# Patient Record
Sex: MALE | Race: WHITE | NOT HISPANIC OR LATINO | Employment: OTHER | URBAN - METROPOLITAN AREA
[De-identification: names, ages, dates, MRNs, and addresses within clinical notes are randomized per-mention and may not be internally consistent; named-entity substitution may affect disease eponyms.]

---

## 2017-01-09 ENCOUNTER — TELEPHONE (OUTPATIENT)
Dept: FAMILY MEDICINE CLINIC | Facility: CLINIC | Age: 62
End: 2017-01-09

## 2017-01-09 RX ORDER — PREDNISONE 10 MG/1
TABLET ORAL
Qty: 20 TABLET | Refills: 0 | Status: SHIPPED | OUTPATIENT
Start: 2017-01-09 | End: 2017-10-30

## 2017-01-09 NOTE — TELEPHONE ENCOUNTER
----- Message from Ludmila Brady sent at 1/9/2017  9:06 AM EST -----  Contact: [facility];PT CALLED  PT WAS SEEN FOR SINUS INFECTION;HE FEELS HE HAS NEVER  GOTTEN COMPLETELY OVER IT BUT NOW IT WORSE-HE HAS   TAKEN MUSENIX  BUT IT GIVES HE DIARRHEA AND HE IS A  [employer] AND CAN NOT TAKE IT AND WORK ALSO TRIED  NYQUIL AND TAKE WITH HIS BLOOD PRESSURE ISSUES    IS THERE SOMETHING TO DRY UP HIS SINUS?  [phone]      PHARMACY-[facility] [place]

## 2017-01-09 NOTE — TELEPHONE ENCOUNTER
SPOKE WITH PT AND INFORMED HIM OF MESSAGE AND PT STATES NO PROBLEMS IN PAST AND VERBALIZED UNDERSTANDING. AND WILL PU IN MORNING. PT STATES THAT HE IS HAVING TOOTH PULLED IN MORNING NO PROBLEMS WITH RX

## 2017-01-09 NOTE — TELEPHONE ENCOUNTER
Please call.  Could try a short course of prednisone or steroids if he has not had any previous problem with these.  If agreeable, start prednisone 10 mg 4 daily for 2 days, then 3 daily for 2 days, then 2 daily for 2 days, then 1 daily for 2 days then stop.  #20.  Remind him to take this with food, and to take in the morning.  It may keep him awake if takes too late in the day.  If does not clear up with this, we will need follow-up office visit.

## 2017-01-16 ENCOUNTER — CLINICAL SUPPORT (OUTPATIENT)
Dept: FAMILY MEDICINE CLINIC | Facility: CLINIC | Age: 62
End: 2017-01-16

## 2017-01-16 VITALS — OXYGEN SATURATION: 98 %

## 2017-01-16 DIAGNOSIS — E53.8 VITAMIN B12 DEFICIENCY: Primary | ICD-10-CM

## 2017-01-16 PROCEDURE — 96372 THER/PROPH/DIAG INJ SC/IM: CPT | Performed by: FAMILY MEDICINE

## 2017-01-16 RX ORDER — CEFDINIR 300 MG/1
300 CAPSULE ORAL 2 TIMES DAILY
Qty: 20 CAPSULE | Refills: 0 | Status: SHIPPED | OUTPATIENT
Start: 2017-01-16 | End: 2017-10-30

## 2017-01-16 RX ORDER — CYANOCOBALAMIN 1000 UG/ML
1000 INJECTION, SOLUTION INTRAMUSCULAR; SUBCUTANEOUS
Status: DISCONTINUED | OUTPATIENT
Start: 2017-01-16 | End: 2020-09-10

## 2017-01-16 RX ADMIN — CYANOCOBALAMIN 1000 MCG: 1000 INJECTION, SOLUTION INTRAMUSCULAR; SUBCUTANEOUS at 13:18

## 2017-01-16 NOTE — PROGRESS NOTES
Patient in for B12 shot today. He said that tomorrow was the last day of the steroid that Dr. Botello had given him. He has been taking Mucinex but still has a bad cough, congestion, and coughing up phlegm.  Has improved some but has not gone completely away. Would like to see if Dr. Botello can recommend something else. He is not allergic to any medications and uses [facility] pharmacy.

## 2017-01-16 NOTE — MR AVS SNAPSHOT
Andrae Garza JrTatianna   1/16/2017 12:30 PM   Clinical Support    Dept Phone:  [phone]   Encounter #:  [id number]    Provider:  NURSE/MARLI LERNER   Department:  [facility] FAMILY MEDICINE                Your Full Care Plan              Today's Medication Changes          These changes are accurate as of: 1/16/17  2:14 PM.  If you have any questions, ask your nurse or doctor.               New Medication(s)Ordered:     cefdinir 300 MG capsule   Commonly known as:  OMNICEF   Take 1 capsule by mouth 2 (Two) Times a Day.   Started by:  Brent Botello MD            Where to Get Your Medications      These medications were sent to [facility]/pharmacy #[id number] [place], [place] - [address] [place] [address] AT [address] [phone] [facility] [phone] [place] [address]    Hours:  24-hours Phone:  [phone]     cefdinir 300 MG capsule                  Your Updated Medication List          This list is accurate as of: 1/16/17  2:14 PM.  Always use your most recent med list.                azithromycin 250 MG tablet   Commonly known as:  ZITHROMAX   Take 2 tablets the first day, then 1 tablet daily for 4 days.       cefdinir 300 MG capsule   Commonly known as:  OMNICEF   Take 1 capsule by mouth 2 (Two) Times a Day.       gabapentin 100 MG capsule   Commonly known as:  NEURONTIN   One po daily for one dose then one po BID for one po TID.       hydrochlorothiazide 25 MG tablet   Commonly known as:  HYDRODIURIL   Take 0.5 tablets by mouth Daily.       nadolol 40 MG tablet   Commonly known as:  CORGARD   Take 1 tablet by mouth Daily.       predniSONE 10 MG tablet   Commonly known as:  DELTASONE   prednisone 10 mg 4 daily for 2 days, then 3 daily for 2 days, then 2 daily for 2 days, then 1 daily for 2 days then stop.               You Were Diagnosed With        Codes Comments    Vitamin B12 deficiency    -  Primary ICD-10-CM: E53.8  ICD-9-CM: 266.2          Medications to be Given to You by a Medical Professional     Due       Frequency    (none) cyanocobalamin injection 1,000 mcg  Every 28 Days      Instructions     None    Patient Instructions History      Upcoming Appointments     Visit Type Date Time Department    INJECTION 1/16/2017 12:30 PM [facility]     Our records indicate that you have [facility]. If you would like to sign up for Social IQ (Social Influence Quotient), please email [email or web address] or call [phone] to obtain an activation code.             Other Info from Your Visit           Allergies     No Known Allergies      Vital Signs     Oxygen Saturation Smoking Status                98% Current Every Day Smoker          Problems and Diagnoses Noted     Vitamin B12 deficiency    -  Primary      Medications Administered     cyanocobalamin injection 1,000 mcg

## 2017-01-16 NOTE — PROGRESS NOTES
Patient was here getting B12 shot.  Reported that he was not getting better with the cough and congestion.  We will try Omnicef 300 mg 2 daily.  Prescription sent.

## 2017-03-13 ENCOUNTER — CLINICAL SUPPORT (OUTPATIENT)
Dept: FAMILY MEDICINE CLINIC | Facility: CLINIC | Age: 62
End: 2017-03-13

## 2017-03-13 ENCOUNTER — TELEPHONE (OUTPATIENT)
Dept: FAMILY MEDICINE CLINIC | Facility: CLINIC | Age: 62
End: 2017-03-13

## 2017-03-13 DIAGNOSIS — E53.8 COBALAMIN DEFICIENCY: ICD-10-CM

## 2017-03-13 DIAGNOSIS — I10 ESSENTIAL HYPERTENSION: ICD-10-CM

## 2017-03-13 DIAGNOSIS — E53.8 B12 DEFICIENCY: Primary | ICD-10-CM

## 2017-03-13 DIAGNOSIS — E78.5 HYPERLIPIDEMIA, UNSPECIFIED: ICD-10-CM

## 2017-03-13 DIAGNOSIS — Z12.5 PROSTATE CANCER SCREENING: Primary | ICD-10-CM

## 2017-03-13 DIAGNOSIS — E53.8 VITAMIN B12 DEFICIENCY: ICD-10-CM

## 2017-03-13 PROCEDURE — 96372 THER/PROPH/DIAG INJ SC/IM: CPT | Performed by: FAMILY MEDICINE

## 2017-03-13 RX ORDER — CYANOCOBALAMIN 1000 UG/ML
1000 INJECTION, SOLUTION INTRAMUSCULAR; SUBCUTANEOUS ONCE
Status: COMPLETED | OUTPATIENT
Start: 2017-03-13 | End: 2017-03-13

## 2017-03-13 RX ORDER — NADOLOL 40 MG/1
40 TABLET ORAL DAILY
Qty: 90 TABLET | Refills: 1 | Status: SHIPPED | OUTPATIENT
Start: 2017-03-13 | End: 2017-11-06 | Stop reason: SDUPTHER

## 2017-03-13 RX ADMIN — CYANOCOBALAMIN 1000 MCG: 1000 INJECTION, SOLUTION INTRAMUSCULAR; SUBCUTANEOUS at 12:14

## 2017-03-13 NOTE — TELEPHONE ENCOUNTER
When pt was in today for his B12 injection, he requested a refill on his Nadolol 40mg and wants to restart his Atorvastatin again.  [facility]

## 2017-03-13 NOTE — TELEPHONE ENCOUNTER
Please call.  I am the nadolol prescription.  Please confirm if he is currently taking any cholesterol medication?  If so, which one and what is the dose?  If not, we need to determine what his chol level is on the blood work that he had done today to see what dose we need to restart the atorvastatin.

## 2017-03-14 LAB
ALBUMIN SERPL-MCNC: 3.9 G/DL (ref 3.2–4.8)
ALBUMIN/GLOB SERPL: 1.9 G/DL (ref 1.5–2.5)
ALP SERPL-CCNC: 86 U/L (ref 25–100)
ALT SERPL-CCNC: 27 U/L (ref 7–40)
AST SERPL-CCNC: 28 U/L (ref 0–33)
BILIRUB SERPL-MCNC: 0.7 MG/DL (ref 0.3–1.2)
BUN SERPL-MCNC: 13 MG/DL (ref 9–23)
BUN/CREAT SERPL: 11.8 (ref 7–25)
CALCIUM SERPL-MCNC: 9.7 MG/DL (ref 8.7–10.4)
CHLORIDE SERPL-SCNC: 103 MMOL/L (ref 99–109)
CHOLEST SERPL-MCNC: 121 MG/DL (ref 0–200)
CHOLEST/HDLC SERPL: 3.67 {RATIO}
CO2 SERPL-SCNC: 29 MMOL/L (ref 20–31)
CREAT SERPL-MCNC: 1.1 MG/DL (ref 0.6–1.3)
GLOBULIN SER CALC-MCNC: 2.1 GM/DL
GLUCOSE SERPL-MCNC: 92 MG/DL (ref 70–100)
HDLC SERPL-MCNC: 33 MG/DL (ref 40–60)
LDLC SERPL CALC-MCNC: 61 MG/DL (ref 0–100)
POTASSIUM SERPL-SCNC: 4.2 MMOL/L (ref 3.5–5.5)
PROT SERPL-MCNC: 6 G/DL (ref 5.7–8.2)
PSA SERPL-MCNC: 2 NG/ML (ref 0–4)
SODIUM SERPL-SCNC: 139 MMOL/L (ref 132–146)
TRIGL SERPL-MCNC: 137 MG/DL (ref 0–150)
VIT B12 SERPL-MCNC: 541 PG/ML (ref 211–911)
VLDLC SERPL CALC-MCNC: 27.4 MG/DL

## 2017-03-15 NOTE — TELEPHONE ENCOUNTER
No, pt is not currently on the Atorvastatin, he wants to go back on what he was on in the past. He does not have his past bottle for the dose.

## 2017-07-10 ENCOUNTER — TELEPHONE (OUTPATIENT)
Dept: FAMILY MEDICINE CLINIC | Facility: CLINIC | Age: 62
End: 2017-07-10

## 2017-07-10 DIAGNOSIS — I10 ESSENTIAL HYPERTENSION: ICD-10-CM

## 2017-07-10 RX ORDER — HYDROCHLOROTHIAZIDE 25 MG/1
12.5 TABLET ORAL DAILY
Qty: 45 TABLET | Refills: 1 | Status: SHIPPED | OUTPATIENT
Start: 2017-07-10 | End: 2017-10-16 | Stop reason: SDUPTHER

## 2017-07-10 NOTE — TELEPHONE ENCOUNTER
----- Message from Ludmila Fields sent at 7/10/2017  9:00 AM EDT -----  Contact: BENTLEY  PATIENT REQUESTING A REFILL:    HYDROCHLOROTHIAZIDE 25 MG  A 90 DAY SUPPLY WITH 1 REFILL SHOULD HOLD HIM TILL HIS NEXT APPT.     [facility] [place]

## 2017-10-16 DIAGNOSIS — I10 ESSENTIAL HYPERTENSION: ICD-10-CM

## 2017-10-16 RX ORDER — HYDROCHLOROTHIAZIDE 25 MG/1
TABLET ORAL
Qty: 45 TABLET | Refills: 1 | Status: SHIPPED | OUTPATIENT
Start: 2017-10-16 | End: 2018-06-25 | Stop reason: SDUPTHER

## 2017-10-30 ENCOUNTER — OFFICE VISIT (OUTPATIENT)
Dept: FAMILY MEDICINE CLINIC | Facility: CLINIC | Age: 62
End: 2017-10-30

## 2017-10-30 VITALS
TEMPERATURE: 97.6 F | HEART RATE: 72 BPM | HEIGHT: 74 IN | SYSTOLIC BLOOD PRESSURE: 126 MMHG | DIASTOLIC BLOOD PRESSURE: 94 MMHG | WEIGHT: 235.4 LBS | RESPIRATION RATE: 20 BRPM | BODY MASS INDEX: 30.21 KG/M2

## 2017-10-30 DIAGNOSIS — I10 ESSENTIAL HYPERTENSION: Primary | ICD-10-CM

## 2017-10-30 PROCEDURE — 99213 OFFICE O/P EST LOW 20 MIN: CPT | Performed by: FAMILY MEDICINE

## 2017-10-30 PROCEDURE — 3008F BODY MASS INDEX DOCD: CPT | Performed by: FAMILY MEDICINE

## 2017-10-30 RX ORDER — CETIRIZINE HYDROCHLORIDE 10 MG/1
10 TABLET ORAL DAILY
COMMUNITY
End: 2019-12-09

## 2017-10-30 RX ORDER — ASPIRIN 81 MG/1
81 TABLET ORAL DAILY
COMMUNITY
End: 2019-12-09 | Stop reason: ALTCHOICE

## 2017-10-30 RX ORDER — LISINOPRIL 10 MG/1
10 TABLET ORAL DAILY
Qty: 30 TABLET | Refills: 0 | Status: SHIPPED | OUTPATIENT
Start: 2017-10-30 | End: 2017-11-26 | Stop reason: SDUPTHER

## 2017-10-30 NOTE — PATIENT INSTRUCTIONS
Go to the nearest ER or retu  Hypertension  Hypertension is another name for high blood pressure. High blood pressure forces your heart to work harder to pump blood. A blood pressure reading has two numbers, which includes a higher number over a lower number (example: 110/72).  HOME CARE   · Have your blood pressure rechecked by your doctor.  · Only take medicine as told by your doctor. Follow the directions carefully. The medicine does not work as well if you skip doses. Skipping doses also puts you at risk for problems.  · Do not smoke.  · Monitor your blood pressure at home as told by your doctor.  GET HELP IF:  · You think you are having a reaction to the medicine you are taking.  · You have repeat headaches or feel dizzy.  · You have puffiness (swelling) in your ankles.  · You have trouble with your vision.  GET HELP RIGHT AWAY IF:   · You get a very bad headache and are confused.  · You feel weak, numb, or faint.  · You get chest or belly (abdominal) pain.  · You throw up (vomit).  · You cannot breathe very well.  MAKE SURE YOU:   · Understand these instructions.  · Will watch your condition.  · Will get help right away if you are not doing well or get worse.     This information is not intended to replace advice given to you by your health care provider. Make sure you discuss any questions you have with your health care provider.     Document Released: 06/05/2009 Document Revised: 12/23/2014 Document Reviewed: 10/10/2014  [employer] Interactive Patient Education ©2017 [employer]  rn to clinic if symptoms worsen, fever/chill develop

## 2017-10-30 NOTE — PROGRESS NOTES
Subjective   Andrae Garza Jr. is a 62 y.o. male.     Hypertension   This is a chronic problem. The current episode started more than 1 year ago. The problem is uncontrolled. Associated symptoms include headaches. Pertinent negatives include no anxiety, blurred vision, chest pain, neck pain, palpitations, peripheral edema, shortness of breath or sweats. Risk factors for coronary artery disease include male gender, obesity and sedentary lifestyle. Past treatments include diuretics and beta blockers. The current treatment provides moderate improvement. There is no history of CVA.   He has a headache in the right temporal region. The same type of headache he had before being treated for HTN, due to elevated blood pressure. Blood pressure tends to be higher in the mornings. He has taken his medication this morning. Has started taking a full HCTZ 25mg tab, instead of just 1/2 dose.   He brought in a BP log, majority readings are >140/90    Currently dealing with a tooth ache and back/shoulder pain. Thinks that increased pain could be causing increased blood pressure.     The following portions of the patient's history were reviewed and updated as appropriate: allergies, current medications, past family history, past medical history, past social history, past surgical history and problem list.    Review of Systems   Constitutional: Negative for chills and fever.   Eyes: Negative for blurred vision.   Respiratory: Negative for chest tightness and shortness of breath.    Cardiovascular: Negative for chest pain and palpitations.   Gastrointestinal: Negative for nausea and vomiting.   Musculoskeletal: Negative for neck pain.   Neurological: Positive for headaches. Negative for dizziness and light-headedness.   Hematological: Negative for adenopathy. Does not bruise/bleed easily.   Psychiatric/Behavioral: Negative for confusion.       Objective   Physical Exam   Constitutional: He is oriented to person, place, and time. He  appears well-developed and well-nourished.   HENT:   Head: Normocephalic and atraumatic.   Right Ear: External ear normal.   Left Ear: External ear normal.   Nose: Nose normal.   Eyes: Conjunctivae are normal.   Neck: Normal range of motion.   Cardiovascular: Normal rate, regular rhythm and normal heart sounds.    Pulmonary/Chest: Effort normal and breath sounds normal.   Musculoskeletal: He exhibits no deformity.   Neurological: He is alert and oriented to person, place, and time. No cranial nerve deficit.   Skin: Skin is warm and dry.   Psychiatric: He has a normal mood and affect. His behavior is normal.   Nursing note and vitals reviewed.      Assessment/Plan   Andrae was seen today for hypertension and headache.    Diagnoses and all orders for this visit:    Essential hypertension  -     lisinopril (PRINIVIL,ZESTRIL) 10 MG tablet; Take 1 tablet by mouth Daily.    BMI 30.0-30.9,adult      He doesn't want to increase Nadolol, states heart rate is typically low in the 60s and 50s. Prefers not to increase HCTZ dosage because of increased diuretic effect. Previously treated with Norvasc, caused LE edema. Will add Lisinopril 10mg to therapy. Most recent BUN/Cr normal, March 2017. He has a routine follow up next week, reevaluate HTN then. Will need to ensure renal function is stable, only one kidney remains after having renal cell carcinoma.   He does have a dentist appointment to address the tooth ache.

## 2017-11-06 ENCOUNTER — OFFICE VISIT (OUTPATIENT)
Dept: FAMILY MEDICINE CLINIC | Facility: CLINIC | Age: 62
End: 2017-11-06

## 2017-11-06 VITALS
BODY MASS INDEX: 29.71 KG/M2 | WEIGHT: 231.5 LBS | SYSTOLIC BLOOD PRESSURE: 108 MMHG | HEIGHT: 74 IN | TEMPERATURE: 97.1 F | HEART RATE: 62 BPM | OXYGEN SATURATION: 97 % | DIASTOLIC BLOOD PRESSURE: 80 MMHG | RESPIRATION RATE: 16 BRPM

## 2017-11-06 DIAGNOSIS — Z95.3 H/O AORTIC VALVE REPLACEMENT WITH PORCINE VALVE: ICD-10-CM

## 2017-11-06 DIAGNOSIS — F17.200 TOBACCO USE DISORDER: ICD-10-CM

## 2017-11-06 DIAGNOSIS — E53.8 COBALAMIN DEFICIENCY: Chronic | ICD-10-CM

## 2017-11-06 DIAGNOSIS — M79.2 THORACIC NEURALGIA: ICD-10-CM

## 2017-11-06 DIAGNOSIS — K58.0 IRRITABLE BOWEL SYNDROME WITH DIARRHEA: ICD-10-CM

## 2017-11-06 DIAGNOSIS — E78.2 MIXED HYPERLIPIDEMIA: ICD-10-CM

## 2017-11-06 DIAGNOSIS — Z12.2 ENCOUNTER FOR SCREENING FOR LUNG CANCER: ICD-10-CM

## 2017-11-06 DIAGNOSIS — I10 ESSENTIAL HYPERTENSION: Primary | ICD-10-CM

## 2017-11-06 PROCEDURE — 99214 OFFICE O/P EST MOD 30 MIN: CPT | Performed by: FAMILY MEDICINE

## 2017-11-06 RX ORDER — NADOLOL 40 MG/1
40 TABLET ORAL DAILY
Qty: 90 TABLET | Refills: 1 | Status: SHIPPED | OUTPATIENT
Start: 2017-11-06 | End: 2018-06-25 | Stop reason: SDUPTHER

## 2017-11-06 RX ORDER — DICYCLOMINE HYDROCHLORIDE 10 MG/1
CAPSULE ORAL
Qty: 30 CAPSULE | Refills: 2 | Status: SHIPPED | OUTPATIENT
Start: 2017-11-06 | End: 2019-04-22

## 2017-11-06 RX ORDER — LIDOCAINE 50 MG/G
1 PATCH TOPICAL EVERY 24 HOURS
Qty: 30 PATCH | Refills: 5 | Status: SHIPPED | OUTPATIENT
Start: 2017-11-06 | End: 2018-01-04

## 2017-11-06 NOTE — PROGRESS NOTES
Assessment/Plan     Problem List Items Addressed This Visit        Cardiovascular and Mediastinum    Hyperlipidemia (Chronic)    Relevant Orders    Comprehensive Metabolic Panel    Lipid Panel With / Chol / HDL Ratio    Hypertension - Primary (Chronic)    Relevant Medications    nadolol (CORGARD) 40 MG tablet    Other Relevant Orders    CBC & Differential    Comprehensive Metabolic Panel    Lipid Panel With / Chol / HDL Ratio       Digestive    Cobalamin deficiency (Chronic)    Relevant Orders    CBC & Differential    Vitamin B12      Other Visit Diagnoses     Thoracic neuralgia        Right mid thoracic area    Relevant Medications    lidocaine (LIDODERM) 5 %    H/O aortic valve replacement with porcine valve        Relevant Orders    Ambulatory Referral to Cardiology    Irritable bowel syndrome with diarrhea        Relevant Medications    dicyclomine (BENTYL) 10 MG capsule    Tobacco use disorder        Relevant Orders    CT chest low dose wo    Encounter for screening for lung cancer        Relevant Orders    CT chest low dose wo           Follow up: Return in about 6 months (around 5/6/2018), or if symptoms worsen or fail to improve, for follow up depends on review of labs and testing.     DISCUSSION  Hypertension.  Stable.  Continue medication.    Hyperlipidemia.  Check labs as noted.    History of tobacco use.  Current tobacco use.  Qualifies for CT screening for lung cancer.  Order placed.    Irritable bowel syndrome.  Diarrhea predominant.  Usually occurs after eating.  Try Bentyl prior to large meal.  Side effects explained.    Thoracic neuralgia.  Occurred after surgery for nephrectomy.  Try lidocaine patch.    History of aortic valve replacement.  Refer to cardiology for establish care.  He would like to see Dr. Thee Abebe in [place].    B12 deficiency.  Recheck level.  Has not been getting shots.    Further plan once we review blood work.      MEDICATIONS PRESCRIBED  Requested Prescriptions      Signed Prescriptions Disp Refills   • lidocaine (LIDODERM) 5 % 30 patch 5     Sig: Place 1 patch on the skin Daily. Remove & Discard patch within 12 hours or as directed by MD   • dicyclomine (BENTYL) 10 MG capsule 30 capsule 2     Sig: One po 1 hr before meals if needed up to 4 times per day   • nadolol (CORGARD) 40 MG tablet 90 tablet 1     Sig: Take 1 tablet by mouth Daily.          -------------------------------------------    Subjective     Chief Complaint   Patient presents with   • Hypertension     follow up    • Labs Only     fasting   • Med Refill       Hypertension   This is a chronic problem. The current episode started more than 1 year ago. The problem has been rapidly improving since onset. The problem is controlled (better with meds). Pertinent negatives include no chest pain, peripheral edema or shortness of breath. (Up every 2 hrs to urinate with the Lisinopril  ) Risk factors for coronary artery disease include dyslipidemia. Past treatments include beta blockers and diuretics (add Lisinopril 10 mg one po daily). The current treatment provides moderate improvement. There are no compliance problems.  There is no history of angina, kidney disease or CAD/MI. There is no history of chronic renal disease.   Hyperlipidemia   This is a chronic problem. The current episode started more than 1 year ago. The problem is controlled. Recent lipid tests were reviewed and are normal. He has no history of chronic renal disease. Pertinent negatives include no chest pain or shortness of breath. He is currently on no antihyperlipidemic treatment. The current treatment provides moderate (was good without meds since last check) improvement of lipids. Risk factors for coronary artery disease include dyslipidemia and hypertension.       B12 def  Has not had b12 shot done in the past      Thoracic back pain   Since epidural shot for surg (kidney surg ) 10 yrs ago. Pain in the middle back. Heat and cold helps. Uses gel  "packs and still hurts. No arm rests and pain without arm rest.   ? Lidocaine patch. ? Nerve pain    Tried acupuncture etc. And no help  ? 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   With in 30 min  Most every time if increased meal.       Past Medical History,Medications, Allergies, and social history was reviewed.    Review of Systems   Constitutional: Positive for fatigue.   HENT: Negative.    Respiratory: Negative.  Negative for shortness of breath.    Cardiovascular: Negative.  Negative for chest pain.   Gastrointestinal: Positive for diarrhea. Negative for blood in stool, nausea and vomiting.   Genitourinary: Negative.    Musculoskeletal: Positive for back pain.   Neurological: Negative.    Psychiatric/Behavioral: Negative.        Objective     Vitals:    11/06/17 0924   BP: 108/80   Pulse: 62   Resp: 16   Temp: 97.1 °F (36.2 °C)   TempSrc: Temporal Artery    SpO2: 97%   Weight: 231 lb 8 oz (105 kg)   Height: 74\" (188 cm)        Physical Exam   Constitutional: He is oriented to person, place, and time. Vital signs are normal. He appears well-developed and well-nourished.   HENT:   Head: Normocephalic and atraumatic.   Right Ear: Hearing, tympanic membrane, external ear and ear canal normal.   Left Ear: Hearing, tympanic membrane, external ear and ear canal normal.   Nose: Nose normal.   Mouth/Throat: Oropharynx is clear and moist.   Eyes: Conjunctivae, EOM and lids are normal. Pupils are equal, round, and reactive to light.   Neck: Normal range of motion. Neck supple. No thyromegaly present.   Cardiovascular: Normal rate, regular rhythm and normal heart sounds.  Exam reveals no friction rub.    No murmur heard.  Pulmonary/Chest: Effort normal and breath sounds normal. No respiratory distress. He has no wheezes. He has no rales.   Abdominal: Soft. Normal appearance and bowel sounds are normal. He exhibits no distension and no mass. There is no " tenderness. There is no rebound and no guarding.   Musculoskeletal: He exhibits no edema.   Neurological: He is alert and oriented to person, place, and time. He has normal strength.   Skin: Skin is warm and dry.   Psychiatric: He has a normal mood and affect. His speech is normal and behavior is normal. Cognition and memory are normal.   Nursing note and vitals reviewed.  Skin check him back in chest and abdomen revealed several areas of seborrheic keratosis nothing concerning appearing.            Brent Botello MD

## 2017-11-07 LAB
ALBUMIN SERPL-MCNC: 4 G/DL (ref 3.2–4.8)
ALBUMIN/GLOB SERPL: 2 G/DL (ref 1.5–2.5)
ALP SERPL-CCNC: 79 U/L (ref 25–100)
ALT SERPL-CCNC: 32 U/L (ref 7–40)
AST SERPL-CCNC: 30 U/L (ref 0–33)
BASOPHILS # BLD AUTO: 0.02 10*3/MM3 (ref 0–0.2)
BASOPHILS NFR BLD AUTO: 0.3 % (ref 0–1)
BILIRUB SERPL-MCNC: 1.2 MG/DL (ref 0.3–1.2)
BUN SERPL-MCNC: 18 MG/DL (ref 9–23)
BUN/CREAT SERPL: 16.4 (ref 7–25)
CALCIUM SERPL-MCNC: 9.4 MG/DL (ref 8.7–10.4)
CHLORIDE SERPL-SCNC: 100 MMOL/L (ref 99–109)
CHOLEST SERPL-MCNC: 157 MG/DL (ref 0–200)
CHOLEST/HDLC SERPL: 4.91 {RATIO}
CO2 SERPL-SCNC: 31 MMOL/L (ref 20–31)
CREAT SERPL-MCNC: 1.1 MG/DL (ref 0.6–1.3)
EOSINOPHIL # BLD AUTO: 0.22 10*3/MM3 (ref 0–0.3)
EOSINOPHIL NFR BLD AUTO: 3.6 % (ref 0–3)
ERYTHROCYTE [DISTWIDTH] IN BLOOD BY AUTOMATED COUNT: 12.4 % (ref 11.3–14.5)
GFR SERPLBLD CREATININE-BSD FMLA CKD-EPI: 68 ML/MIN/1.73
GFR SERPLBLD CREATININE-BSD FMLA CKD-EPI: 82 ML/MIN/1.73
GLOBULIN SER CALC-MCNC: 2 GM/DL
GLUCOSE SERPL-MCNC: 96 MG/DL (ref 70–100)
HCT VFR BLD AUTO: 46.1 % (ref 38.9–50.9)
HDLC SERPL-MCNC: 32 MG/DL (ref 40–60)
HGB BLD-MCNC: 15.5 G/DL (ref 13.1–17.5)
IMM GRANULOCYTES # BLD: 0.01 10*3/MM3 (ref 0–0.03)
IMM GRANULOCYTES NFR BLD: 0.2 % (ref 0–0.6)
LDLC SERPL CALC-MCNC: 97 MG/DL (ref 0–100)
LYMPHOCYTES # BLD AUTO: 1.37 10*3/MM3 (ref 0.6–4.8)
LYMPHOCYTES NFR BLD AUTO: 22.7 % (ref 24–44)
MCH RBC QN AUTO: 31.1 PG (ref 27–31)
MCHC RBC AUTO-ENTMCNC: 33.6 G/DL (ref 32–36)
MCV RBC AUTO: 92.4 FL (ref 80–99)
MONOCYTES # BLD AUTO: 0.5 10*3/MM3 (ref 0–1)
MONOCYTES NFR BLD AUTO: 8.3 % (ref 0–12)
NEUTROPHILS # BLD AUTO: 3.91 10*3/MM3 (ref 1.5–8.3)
NEUTROPHILS NFR BLD AUTO: 64.9 % (ref 41–71)
PLATELET # BLD AUTO: 203 10*3/MM3 (ref 150–450)
POTASSIUM SERPL-SCNC: 3.9 MMOL/L (ref 3.5–5.5)
PROT SERPL-MCNC: 6 G/DL (ref 5.7–8.2)
RBC # BLD AUTO: 4.99 10*6/MM3 (ref 4.2–5.76)
SODIUM SERPL-SCNC: 138 MMOL/L (ref 132–146)
TRIGL SERPL-MCNC: 141 MG/DL (ref 0–150)
VIT B12 SERPL-MCNC: 433 PG/ML (ref 211–911)
VLDLC SERPL CALC-MCNC: 28.2 MG/DL
WBC # BLD AUTO: 6.03 10*3/MM3 (ref 3.5–10.8)

## 2017-11-13 ENCOUNTER — APPOINTMENT (OUTPATIENT)
Dept: CT IMAGING | Facility: HOSPITAL | Age: 62
End: 2017-11-13

## 2017-11-13 ENCOUNTER — TELEPHONE (OUTPATIENT)
Dept: FAMILY MEDICINE CLINIC | Facility: CLINIC | Age: 62
End: 2017-11-13

## 2017-11-13 ENCOUNTER — HOSPITAL ENCOUNTER (OUTPATIENT)
Dept: CT IMAGING | Facility: HOSPITAL | Age: 62
Discharge: HOME | End: 2017-11-13
Admitting: FAMILY MEDICINE

## 2017-11-13 DIAGNOSIS — G89.29 CHRONIC BILATERAL THORACIC BACK PAIN: ICD-10-CM

## 2017-11-13 DIAGNOSIS — M79.2 THORACIC NEURALGIA: Primary | ICD-10-CM

## 2017-11-13 DIAGNOSIS — M54.6 CHRONIC BILATERAL THORACIC BACK PAIN: ICD-10-CM

## 2017-11-13 DIAGNOSIS — Z12.2 ENCOUNTER FOR SCREENING FOR LUNG CANCER: ICD-10-CM

## 2017-11-13 DIAGNOSIS — F17.200 TOBACCO USE DISORDER: ICD-10-CM

## 2017-11-13 PROCEDURE — G0297 LDCT FOR LUNG CA SCREEN: HCPCS

## 2017-11-13 NOTE — TELEPHONE ENCOUNTER
Please call, I do not know of any other type of medication that might help this except it would make him very sleepy and may be difficult to drive.      Did he ever have an MRI of his thoracic spine to see if there is a herniated disc?   If not, recommend setting up MRI of the thoracic spine.  Diagnosis thoracic back pain, chronic.

## 2017-11-13 NOTE — TELEPHONE ENCOUNTER
----- Message from Sangita Wayne sent at 11/13/2017 12:13 PM EST -----  Contact: DR. BENTLEY; PT UPDATE  PT STATED THAT LAST TIME HE WAS HERE HE WAS OFFERED THE LIDOCAINE PATCHES AND THEY HAVE NO EFFECT ON HIM. HE WANTED TO KNOW IF THERE WAS ANYTHING ELSE THAT HE CAN TRY?       CALL BACK   [phone]  OKAY TO LEAVE AIL

## 2017-11-16 NOTE — TELEPHONE ENCOUNTER
Please call.  MRI of the thoracic spine was ordered and also, see CT scan of the chest result note.

## 2017-11-22 ENCOUNTER — HOSPITAL ENCOUNTER (OUTPATIENT)
Dept: MRI IMAGING | Facility: HOSPITAL | Age: 62
Discharge: HOME | End: 2017-11-22
Admitting: FAMILY MEDICINE

## 2017-11-22 DIAGNOSIS — M54.6 CHRONIC BILATERAL THORACIC BACK PAIN: ICD-10-CM

## 2017-11-22 DIAGNOSIS — G89.29 CHRONIC BILATERAL THORACIC BACK PAIN: ICD-10-CM

## 2017-11-22 DIAGNOSIS — M79.2 THORACIC NEURALGIA: ICD-10-CM

## 2017-11-22 PROCEDURE — 72146 MRI CHEST SPINE W/O DYE: CPT

## 2017-11-26 DIAGNOSIS — I10 ESSENTIAL HYPERTENSION: ICD-10-CM

## 2017-11-26 RX ORDER — LISINOPRIL 10 MG/1
TABLET ORAL
Qty: 30 TABLET | Refills: 5 | Status: SHIPPED | OUTPATIENT
Start: 2017-11-26 | End: 2017-12-04 | Stop reason: SDUPTHER

## 2017-12-04 ENCOUNTER — TELEPHONE (OUTPATIENT)
Dept: FAMILY MEDICINE CLINIC | Facility: CLINIC | Age: 62
End: 2017-12-04

## 2017-12-04 DIAGNOSIS — I10 ESSENTIAL HYPERTENSION: ICD-10-CM

## 2017-12-04 RX ORDER — LISINOPRIL 20 MG/1
20 TABLET ORAL DAILY
Qty: 30 TABLET | Refills: 5 | Status: SHIPPED | OUTPATIENT
Start: 2017-12-04 | End: 2018-08-02 | Stop reason: SDUPTHER

## 2017-12-04 NOTE — TELEPHONE ENCOUNTER
Looks like Dr. Valencia had sent  in a refill of his blood pressure medicine on 11/26/2017 so I would like to change the.  I am going to send in a new prescription for lisinopril 20 mg one daily #30+5 refills given increased blood pressure.  Please let patient know and I will make a note on the prescription to cancel the 10 mg lisinopril at the pharmacy.  Continue to monitor blood pressure and call if not improving.

## 2017-12-04 NOTE — TELEPHONE ENCOUNTER
----- Message from Ludmila Brady sent at 12/4/2017  9:08 AM EST -----  Contact: [facility];PT CALLED  PT RETURNED CALL ABOUT MRI-UNABLE TO GET NURSE ON LINE-PT WAS  INFORMED OF RESULTS AND HE REQUEST THE LABS AND IMAGE RESULTS  TO BE SENT TO HIM AND HE WILL DECIDE FROM THERE WHICH TREATMENT  HE WANTS TO START.  PT IS A  AND UNABLE TO TAKE CALLS AT WORK DUE   TO THE BUSY SEASON.  PT STATES HIS BP /95 TODAY AND IT HAS BEEN RUNNING HIGH-HE  NEEDS A REFILL ON HIS LISINOPRIL 10MG AT [facility] UNLESS DR BENTLEY FEELS HE NEEDS TO INCREASE IT    [phone]

## 2017-12-11 ENCOUNTER — TELEPHONE (OUTPATIENT)
Dept: FAMILY MEDICINE CLINIC | Facility: CLINIC | Age: 62
End: 2017-12-11

## 2017-12-11 DIAGNOSIS — M51.9 THORACIC DISC DISEASE: Primary | ICD-10-CM

## 2017-12-11 NOTE — TELEPHONE ENCOUNTER
----- Message from Gabriel Ambriz sent at 12/11/2017  2:19 PM EST -----  Contact: [facility] / [facility] CALL  PATIENT CALLED AND WOULD LIKE A REFERRAL FOR PAIN MANAGEMENT.  HE WOULD LIKE TO SEE THE NEW DR THAT IS AT [facility].  [facility]

## 2018-01-04 ENCOUNTER — OFFICE VISIT (OUTPATIENT)
Dept: FAMILY MEDICINE CLINIC | Facility: CLINIC | Age: 63
End: 2018-01-04

## 2018-01-04 VITALS
RESPIRATION RATE: 16 BRPM | SYSTOLIC BLOOD PRESSURE: 112 MMHG | WEIGHT: 230 LBS | OXYGEN SATURATION: 98 % | TEMPERATURE: 98.5 F | HEIGHT: 74 IN | BODY MASS INDEX: 29.52 KG/M2 | HEART RATE: 60 BPM | DIASTOLIC BLOOD PRESSURE: 82 MMHG

## 2018-01-04 DIAGNOSIS — J06.9 PROTRACTED URI: Primary | ICD-10-CM

## 2018-01-04 DIAGNOSIS — R05.3 CHRONIC COUGH: ICD-10-CM

## 2018-01-04 PROCEDURE — 99213 OFFICE O/P EST LOW 20 MIN: CPT | Performed by: FAMILY MEDICINE

## 2018-01-04 RX ORDER — PREDNISONE 10 MG/1
TABLET ORAL
Qty: 20 TABLET | Refills: 0 | Status: SHIPPED | OUTPATIENT
Start: 2018-01-04 | End: 2018-06-25

## 2018-01-04 RX ORDER — AZITHROMYCIN 250 MG/1
TABLET, FILM COATED ORAL
Qty: 6 TABLET | Refills: 0 | Status: SHIPPED | OUTPATIENT
Start: 2018-01-04 | End: 2018-06-25

## 2018-01-04 NOTE — PROGRESS NOTES
Assessment/Plan     Problem List Items Addressed This Visit     None      Visit Diagnoses     Protracted URI    -  Primary    Relevant Medications    predniSONE (DELTASONE) 10 MG tablet    azithromycin (ZITHROMAX) 250 MG tablet    Chronic cough        Relevant Medications    azithromycin (ZITHROMAX) 250 MG tablet           Follow up: No Follow-up on file.     DISCUSSION  Due to persistent upper respiratory infection.  Start prednisone and Z-James.  Side effects explained.  Increase fluids.  Rest.  Off work 2018 through 2018.  Return to work next week but call if not improving.  Continue efforts to quit smoking.      MEDICATIONS PRESCRIBED  Requested Prescriptions     Signed Prescriptions Disp Refills   • predniSONE (DELTASONE) 10 MG tablet 20 tablet 0     Si po x 2 days then 3 po daily x 2 days then 2 po daily x 2 days then 1 po daily x 2 days then stop.   • azithromycin (ZITHROMAX) 250 MG tablet 6 tablet 0     Sig: Take 2 tablets the first day, then 1 tablet daily for 4 days.          -------------------------------------------    Subjective     Chief Complaint   Patient presents with   • Sinusitis     drainage and not sure of color,    • Cough     drainage in throat   • Diarrhea   • Fatigue     wore out       URI    This is a new problem. The current episode started 1 to 4 weeks ago (x 2 weeks , sx worse ). The problem has been gradually worsening. Associated symptoms include chest pain (ribs), congestion, coughing (increased this week), diarrhea (with any food this week (? related to Nyquil med), better some today) and rhinorrhea (large volume). Pertinent negatives include no ear pain, nausea, sinus pain or vomiting. He has tried decongestant (nyquil 4 times per day) for the symptoms. The treatment provided mild relief.     Has missed work this week so far since Tuesday       + Tob 1 ppd        Past Medical History,Medications, Allergies, and social history was reviewed.    Review of Systems   HENT:  "Positive for congestion and rhinorrhea (large volume). Negative for ear pain and sinus pain.    Respiratory: Positive for cough (increased this week).    Cardiovascular: Positive for chest pain (ribs).   Gastrointestinal: Positive for diarrhea (with any food this week (? related to Nyquil med), better some today). Negative for nausea and vomiting.       Objective     Vitals:    01/04/18 1606   BP: 112/82   Pulse: 60   Resp: 16   Temp: 98.5 °F (36.9 °C)   TempSrc: Temporal Artery    SpO2: 98%   Weight: 104 kg (230 lb)   Height: 188 cm (74.02\")        Physical Exam   Constitutional: He is oriented to person, place, and time. Vital signs are normal. He appears well-developed and well-nourished.   HENT:   Head: Normocephalic and atraumatic.   Right Ear: Hearing, external ear and ear canal normal. Tympanic membrane is retracted. Tympanic membrane is not erythematous.   Left Ear: Hearing, external ear and ear canal normal. Tympanic membrane is retracted. Tympanic membrane is not erythematous.   Nose: Right sinus exhibits no maxillary sinus tenderness. Left sinus exhibits maxillary sinus tenderness (very minimal tenderness).   Mouth/Throat: Uvula is midline. Posterior oropharyngeal erythema present. No oropharyngeal exudate.   Eyes: Conjunctivae, EOM and lids are normal. Pupils are equal, round, and reactive to light.   Neck: Normal range of motion. Neck supple. No thyromegaly present.   Cardiovascular: Normal rate, regular rhythm and normal heart sounds.  Exam reveals no friction rub.    No murmur heard.  Pulmonary/Chest: Effort normal and breath sounds normal. No respiratory distress. He has no wheezes. He has no rales.   Abdominal: Normal appearance.   Musculoskeletal: He exhibits no edema.   Neurological: He is alert and oriented to person, place, and time. He has normal strength.   Skin: Skin is warm and dry.   Psychiatric: He has a normal mood and affect. His speech is normal and behavior is normal. Cognition and " memory are normal.   Nursing note and vitals reviewed.              Brent Botello MD

## 2018-06-25 ENCOUNTER — OFFICE VISIT (OUTPATIENT)
Dept: FAMILY MEDICINE CLINIC | Facility: CLINIC | Age: 63
End: 2018-06-25

## 2018-06-25 ENCOUNTER — TELEPHONE (OUTPATIENT)
Dept: FAMILY MEDICINE CLINIC | Facility: CLINIC | Age: 63
End: 2018-06-25

## 2018-06-25 VITALS
OXYGEN SATURATION: 98 % | RESPIRATION RATE: 18 BRPM | HEIGHT: 74 IN | TEMPERATURE: 97.6 F | SYSTOLIC BLOOD PRESSURE: 104 MMHG | DIASTOLIC BLOOD PRESSURE: 78 MMHG | WEIGHT: 229.5 LBS | HEART RATE: 56 BPM | BODY MASS INDEX: 29.45 KG/M2

## 2018-06-25 DIAGNOSIS — E78.2 MIXED HYPERLIPIDEMIA: ICD-10-CM

## 2018-06-25 DIAGNOSIS — M25.551 PAIN OF BOTH HIP JOINTS: ICD-10-CM

## 2018-06-25 DIAGNOSIS — M54.5 CHRONIC BILATERAL LOW BACK PAIN, WITH SCIATICA PRESENCE UNSPECIFIED: ICD-10-CM

## 2018-06-25 DIAGNOSIS — E53.8 B12 DEFICIENCY: ICD-10-CM

## 2018-06-25 DIAGNOSIS — Z12.5 PROSTATE CANCER SCREENING: ICD-10-CM

## 2018-06-25 DIAGNOSIS — Z11.59 NEED FOR HEPATITIS C SCREENING TEST: ICD-10-CM

## 2018-06-25 DIAGNOSIS — Z00.00 WELL ADULT EXAM: Primary | ICD-10-CM

## 2018-06-25 DIAGNOSIS — M25.552 PAIN OF BOTH HIP JOINTS: ICD-10-CM

## 2018-06-25 DIAGNOSIS — G89.29 CHRONIC BILATERAL LOW BACK PAIN, WITH SCIATICA PRESENCE UNSPECIFIED: ICD-10-CM

## 2018-06-25 DIAGNOSIS — I10 ESSENTIAL HYPERTENSION: ICD-10-CM

## 2018-06-25 DIAGNOSIS — M79.2 THORACIC NEURALGIA: ICD-10-CM

## 2018-06-25 PROBLEM — Z95.3 H/O AORTIC VALVE REPLACEMENT WITH PORCINE VALVE: Status: ACTIVE | Noted: 2018-06-25

## 2018-06-25 PROCEDURE — 99396 PREV VISIT EST AGE 40-64: CPT | Performed by: FAMILY MEDICINE

## 2018-06-25 RX ORDER — HYDROCHLOROTHIAZIDE 25 MG/1
25 TABLET ORAL DAILY
Qty: 90 TABLET | Refills: 1 | Status: SHIPPED | OUTPATIENT
Start: 2018-06-25 | End: 2019-03-03 | Stop reason: SDUPTHER

## 2018-06-25 RX ORDER — BUPROPION HYDROCHLORIDE 150 MG/1
TABLET, EXTENDED RELEASE ORAL
Qty: 60 TABLET | Refills: 2 | Status: SHIPPED | OUTPATIENT
Start: 2018-06-25 | End: 2019-04-22

## 2018-06-25 RX ORDER — NADOLOL 40 MG/1
40 TABLET ORAL DAILY
Qty: 90 TABLET | Refills: 1 | Status: SHIPPED | OUTPATIENT
Start: 2018-06-25 | End: 2019-01-24 | Stop reason: SDUPTHER

## 2018-06-25 NOTE — PROGRESS NOTES
Assessment/Plan       Problems Addressed this Visit        Cardiovascular and Mediastinum    Hyperlipidemia (Chronic)    Relevant Orders    Lipid Panel With / Chol / HDL Ratio    Comprehensive Metabolic Panel    Hypertension (Chronic)    Relevant Medications    hydrochlorothiazide (HYDRODIURIL) 25 MG tablet    nadolol (CORGARD) 40 MG tablet    Other Relevant Orders    CBC & Differential    Lipid Panel With / Chol / HDL Ratio    Comprehensive Metabolic Panel      Other Visit Diagnoses     Well adult exam    -  Primary    Relevant Orders    CBC & Differential    Lipid Panel With / Chol / HDL Ratio    PSA Screen    Comprehensive Metabolic Panel    Need for hepatitis C screening test        Relevant Orders    Hepatitis C Antibody    Prostate cancer screening        Relevant Orders    PSA Screen    B12 deficiency        Relevant Orders    Vitamin B12    Thoracic neuralgia        Relevant Orders    C-reactive Protein    Pain of both hip joints        Relevant Orders    C-reactive Protein    Chronic bilateral low back pain, with sciatica presence unspecified        Relevant Orders    C-reactive Protein            Follow up: Return if symptoms worsen or fail to improve.     DISCUSSION  Well exam with multiple chronic problems as noted.    Check labs as noted.    Blood pressure is been a little bit elevated at times to increase hydrochlorothiazide to 25 mg one whole tablet.    Smoking cessation.  Previously he is not been 100% committed to quit smoking.  His wife is here today and wants him to have a pill to take.  I explained that it is going to work better if he is ready and 100% willing to quit.  He has previously tried Chantix and caused nightmares.  He is willing to try Wellbutrin.  Prescription was given to him to take to the pharmacy.  He requested a printed prescription.    Continue routine health maintenance including dentistry, eye exams, safety, nutrition.  Check PSA today.  Hepatitis C  screening.      MEDICATIONS PRESCRIBED  Requested Prescriptions     Signed Prescriptions Disp Refills   • hydrochlorothiazide (HYDRODIURIL) 25 MG tablet 90 tablet 1     Sig: Take 1 tablet by mouth Daily.   • nadolol (CORGARD) 40 MG tablet 90 tablet 1     Sig: Take 1 tablet by mouth Daily.   • buPROPion SR (WELLBUTRIN SR) 150 MG 12 hr tablet 60 tablet 2     Sig: One po daily for 7 days then one po BID          -------------------------------------------    Subjective     Chief Complaint   Patient presents with   • Annual Exam   • Labs Only     HEP C SCREENING DUE ALSO   • Med Refill         History of Present Illness    The patient is a 62 y.o. male who comes in to the office today for well exam.        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   Eye Exam: once per year. Last Jan 2018    Stressors: work stress. overwhelming mail routes.     Advanced Directives:  Living will    Last PSA:  3/2017    Colonoscopy: ? 2013, calling for results      Other concerns/problems:       IBS sx 2 weeks ago, bentyl was not helpful  Tried imodium as needed/ 1/2 pill once per week and has helped  Has had cholecystectomy  Eats and has rapid need for BM and diarrhea  Has had colon 2013  Had pilonidal cyst        Shoulder pain and thoracic back pain , had MRI and pain still  Saw Pain mgt and rec skelaxin, tried and was no help  Has had pain since epidural for pain during surg years ago      Back pain and hip pain. Low back.   Has a sleep     Hip pain   Gives out and pain at times  No stiffness    Back pain, once gets up, pain gets better.     Saw Dr Abebe and had ECHO and valve was leaking some    Tobacco: + chantix, caused nightmares  Smoking for 40 years      Alcohol 2 bourbons per night      HTN  BP has been increased  130/85-90  On HCTZ 12.5 per day    BPH  Urinary hesitancy  Increased freq  Has seen urologist  Dr Lomeli in the  "past      History   Smoking Status   • Current Every Day Smoker   • Packs/day: 1.00   • Years: 40.00   • Types: Cigarettes   Smokeless Tobacco   • Never Used        Past Medical History,Medications, Allergies, and social history was reviewed.      Review of Systems   Constitutional: Positive for fatigue.   HENT: Negative.    Respiratory: Negative.    Cardiovascular: Negative.    Gastrointestinal: Negative.    Genitourinary: Positive for difficulty urinating.        History of BPH.  Sees urology.   Musculoskeletal: Positive for arthralgias, back pain and gait problem.   Psychiatric/Behavioral: Negative.        Objective     Vitals:    06/25/18 0854   BP: 104/78   Pulse: 56   Resp: 18   Temp: 97.6 °F (36.4 °C)   TempSrc: Temporal Artery    SpO2: 98%   Weight: 104 kg (229 lb 8 oz)   Height: 188 cm (74.02\")          Physical Exam   Constitutional: He is oriented to person, place, and time. Vital signs are normal. He appears well-developed and well-nourished.   HENT:   Head: Normocephalic and atraumatic.   Right Ear: Hearing, tympanic membrane, external ear and ear canal normal.   Left Ear: Hearing, tympanic membrane, external ear and ear canal normal.   Mouth/Throat: Oropharynx is clear and moist.   Eyes: Conjunctivae, EOM and lids are normal. Pupils are equal, round, and reactive to light.   Neck: Normal range of motion. Neck supple. No thyromegaly present.   Cardiovascular: Normal rate, regular rhythm and normal heart sounds.  Exam reveals no friction rub.    No murmur heard.  Pulmonary/Chest: Effort normal and breath sounds normal. No respiratory distress. He has no wheezes. He has no rales.   Abdominal: Soft. Normal appearance and bowel sounds are normal. He exhibits no distension and no mass. There is no tenderness. There is no rebound and no guarding.   Musculoskeletal: He exhibits no edema.   Antalgic gait.  Nontender lumbar spine.  Range of motion of hips is intact without significant discomfort.  Pain in " thoracic spine is to the right between shoulder blade and the mid back.   Lymphadenopathy:     He has no cervical adenopathy.   Neurological: He is alert and oriented to person, place, and time. He has normal strength.   Skin: Skin is warm and dry.   Psychiatric: He has a normal mood and affect. His speech is normal and behavior is normal. Cognition and memory are normal.   Nursing note and vitals reviewed.                Brent Botello MD

## 2018-06-25 NOTE — TELEPHONE ENCOUNTER
Please call Dr Curran office and see if he has a colonoscopy done in 2013 or near that time and if so , Please fax copy to us. bds

## 2018-06-25 NOTE — TELEPHONE ENCOUNTER
REQUESTED RECORDS. PLEASE LET ME KNOW IF YOU DON'T GET. I ALSO REQUESTED RECORDS FROM  [facility] AS WIFE STATES HE WENT THERE AND SOME COLON STUFF DONE.

## 2018-06-26 LAB
ALBUMIN SERPL-MCNC: 3.86 G/DL (ref 3.2–4.8)
ALBUMIN/GLOB SERPL: 1.8 G/DL (ref 1.5–2.5)
ALP SERPL-CCNC: 87 U/L (ref 25–100)
ALT SERPL-CCNC: 21 U/L (ref 7–40)
AST SERPL-CCNC: 24 U/L (ref 0–33)
BASOPHILS # BLD AUTO: 0.03 10*3/MM3 (ref 0–0.2)
BASOPHILS NFR BLD AUTO: 0.4 % (ref 0–1)
BILIRUB SERPL-MCNC: 1.4 MG/DL (ref 0.3–1.2)
BUN SERPL-MCNC: 16 MG/DL (ref 9–23)
BUN/CREAT SERPL: 14.7 (ref 7–25)
CALCIUM SERPL-MCNC: 8.6 MG/DL (ref 8.7–10.4)
CHLORIDE SERPL-SCNC: 105 MMOL/L (ref 99–109)
CHOLEST SERPL-MCNC: 144 MG/DL (ref 0–200)
CHOLEST/HDLC SERPL: 4.65 {RATIO}
CO2 SERPL-SCNC: 32 MMOL/L (ref 20–31)
CREAT SERPL-MCNC: 1.09 MG/DL (ref 0.6–1.3)
CRP SERPL-MCNC: 0.11 MG/DL (ref 0–1)
EOSINOPHIL # BLD AUTO: 0.19 10*3/MM3 (ref 0–0.3)
EOSINOPHIL NFR BLD AUTO: 2.7 % (ref 0–3)
ERYTHROCYTE [DISTWIDTH] IN BLOOD BY AUTOMATED COUNT: 13 % (ref 11.3–14.5)
GFR SERPLBLD CREATININE-BSD FMLA CKD-EPI: 68 ML/MIN/1.73
GFR SERPLBLD CREATININE-BSD FMLA CKD-EPI: 83 ML/MIN/1.73
GLOBULIN SER CALC-MCNC: 2.1 GM/DL
GLUCOSE SERPL-MCNC: 89 MG/DL (ref 70–100)
HCT VFR BLD AUTO: 50.2 % (ref 38.9–50.9)
HCV AB S/CO SERPL IA: <0.1 S/CO RATIO (ref 0–0.9)
HDLC SERPL-MCNC: 31 MG/DL (ref 40–60)
HGB BLD-MCNC: 16.3 G/DL (ref 13.1–17.5)
IMM GRANULOCYTES # BLD: 0.01 10*3/MM3 (ref 0–0.03)
IMM GRANULOCYTES NFR BLD: 0.1 % (ref 0–0.6)
LDLC SERPL CALC-MCNC: 83 MG/DL (ref 0–100)
LYMPHOCYTES # BLD AUTO: 1.55 10*3/MM3 (ref 0.6–4.8)
LYMPHOCYTES NFR BLD AUTO: 21.9 % (ref 24–44)
MCH RBC QN AUTO: 30.8 PG (ref 27–31)
MCHC RBC AUTO-ENTMCNC: 32.5 G/DL (ref 32–36)
MCV RBC AUTO: 94.9 FL (ref 80–99)
MONOCYTES # BLD AUTO: 0.42 10*3/MM3 (ref 0–1)
MONOCYTES NFR BLD AUTO: 5.9 % (ref 0–12)
NEUTROPHILS # BLD AUTO: 4.87 10*3/MM3 (ref 1.5–8.3)
NEUTROPHILS NFR BLD AUTO: 69 % (ref 41–71)
PLATELET # BLD AUTO: 201 10*3/MM3 (ref 150–450)
POTASSIUM SERPL-SCNC: 4 MMOL/L (ref 3.5–5.5)
PROT SERPL-MCNC: 6 G/DL (ref 5.7–8.2)
PSA SERPL-MCNC: 1.99 NG/ML (ref 0–4)
RBC # BLD AUTO: 5.29 10*6/MM3 (ref 4.2–5.76)
SODIUM SERPL-SCNC: 143 MMOL/L (ref 132–146)
TRIGL SERPL-MCNC: 152 MG/DL (ref 0–150)
VIT B12 SERPL-MCNC: >2000 PG/ML (ref 211–911)
VLDLC SERPL CALC-MCNC: 30.4 MG/DL
WBC # BLD AUTO: 7.07 10*3/MM3 (ref 3.5–10.8)

## 2018-08-02 DIAGNOSIS — I10 ESSENTIAL HYPERTENSION: ICD-10-CM

## 2018-08-02 RX ORDER — LISINOPRIL 20 MG/1
20 TABLET ORAL DAILY
Qty: 30 TABLET | Refills: 5 | Status: SHIPPED | OUTPATIENT
Start: 2018-08-02 | End: 2019-04-22

## 2019-01-24 DIAGNOSIS — I10 ESSENTIAL HYPERTENSION: ICD-10-CM

## 2019-01-24 RX ORDER — NADOLOL 40 MG/1
TABLET ORAL
Qty: 90 TABLET | Refills: 0 | Status: SHIPPED | OUTPATIENT
Start: 2019-01-24 | End: 2019-03-25 | Stop reason: SDUPTHER

## 2019-03-03 DIAGNOSIS — I10 ESSENTIAL HYPERTENSION: ICD-10-CM

## 2019-03-04 RX ORDER — HYDROCHLOROTHIAZIDE 25 MG/1
TABLET ORAL
Qty: 14 TABLET | Refills: 0 | Status: SHIPPED | OUTPATIENT
Start: 2019-03-04 | End: 2019-03-25 | Stop reason: SDUPTHER

## 2019-03-25 ENCOUNTER — TELEPHONE (OUTPATIENT)
Dept: FAMILY MEDICINE CLINIC | Facility: CLINIC | Age: 64
End: 2019-03-25

## 2019-03-25 DIAGNOSIS — I10 ESSENTIAL HYPERTENSION: ICD-10-CM

## 2019-03-25 RX ORDER — NADOLOL 40 MG/1
40 TABLET ORAL DAILY
Qty: 30 TABLET | Refills: 0 | Status: SHIPPED | OUTPATIENT
Start: 2019-03-25 | End: 2019-04-22 | Stop reason: SDUPTHER

## 2019-03-25 RX ORDER — HYDROCHLOROTHIAZIDE 25 MG/1
25 TABLET ORAL DAILY
Qty: 30 TABLET | Refills: 0 | Status: SHIPPED | OUTPATIENT
Start: 2019-03-25 | End: 2019-04-22 | Stop reason: SDUPTHER

## 2019-03-25 NOTE — TELEPHONE ENCOUNTER
----- Message from Gabriel Casas sent at 3/25/2019 10:56 AM EDT -----  Contact: PT; [facility]    hydrochlorothiazide (HYDRODIURIL) 25 MG tablet     nadolol (CORGARD) 40 MG tablet     [facility]    PT: [phone]

## 2019-03-25 NOTE — TELEPHONE ENCOUNTER
Please call, requested meds sent to pharmacy.     Due for office visit And blood work.

## 2019-03-25 NOTE — TELEPHONE ENCOUNTER
Spoke with pt and advised. He verbalized good understanding, thanked our office and we ended the call.

## 2019-04-17 DIAGNOSIS — I10 ESSENTIAL HYPERTENSION: ICD-10-CM

## 2019-04-17 RX ORDER — HYDROCHLOROTHIAZIDE 25 MG/1
TABLET ORAL
Qty: 30 TABLET | Refills: 0 | OUTPATIENT
Start: 2019-04-17

## 2019-04-22 ENCOUNTER — OFFICE VISIT (OUTPATIENT)
Dept: FAMILY MEDICINE CLINIC | Facility: CLINIC | Age: 64
End: 2019-04-22

## 2019-04-22 VITALS
HEIGHT: 74 IN | RESPIRATION RATE: 18 BRPM | DIASTOLIC BLOOD PRESSURE: 72 MMHG | WEIGHT: 236 LBS | SYSTOLIC BLOOD PRESSURE: 118 MMHG | BODY MASS INDEX: 30.29 KG/M2 | HEART RATE: 64 BPM | TEMPERATURE: 97.8 F

## 2019-04-22 DIAGNOSIS — E78.2 MIXED HYPERLIPIDEMIA: ICD-10-CM

## 2019-04-22 DIAGNOSIS — I10 ESSENTIAL HYPERTENSION: Primary | ICD-10-CM

## 2019-04-22 DIAGNOSIS — M25.551 BILATERAL HIP PAIN: ICD-10-CM

## 2019-04-22 DIAGNOSIS — M25.552 BILATERAL HIP PAIN: ICD-10-CM

## 2019-04-22 DIAGNOSIS — K58.0 IRRITABLE BOWEL SYNDROME WITH DIARRHEA: ICD-10-CM

## 2019-04-22 DIAGNOSIS — R74.8 ELEVATED VITAMIN B12 LEVEL: ICD-10-CM

## 2019-04-22 PROCEDURE — 99214 OFFICE O/P EST MOD 30 MIN: CPT | Performed by: FAMILY MEDICINE

## 2019-04-22 RX ORDER — TAMSULOSIN HYDROCHLORIDE 0.4 MG/1
CAPSULE ORAL
COMMUNITY
End: 2020-09-10 | Stop reason: SDUPTHER

## 2019-04-22 RX ORDER — NADOLOL 40 MG/1
40 TABLET ORAL DAILY
Qty: 90 TABLET | Refills: 1 | Status: SHIPPED | OUTPATIENT
Start: 2019-04-22 | End: 2019-09-09 | Stop reason: SDUPTHER

## 2019-04-22 RX ORDER — DICYCLOMINE HCL 20 MG
TABLET ORAL
Qty: 30 TABLET | Refills: 2 | Status: SHIPPED | OUTPATIENT
Start: 2019-04-22 | End: 2020-09-10

## 2019-04-22 RX ORDER — HYDROCHLOROTHIAZIDE 25 MG/1
25 TABLET ORAL DAILY
Qty: 90 TABLET | Refills: 1 | Status: SHIPPED | OUTPATIENT
Start: 2019-04-22 | End: 2019-09-09 | Stop reason: SDUPTHER

## 2019-04-22 NOTE — PROGRESS NOTES
Assessment/Plan       Problems Addressed this Visit        Cardiovascular and Mediastinum    Hyperlipidemia (Chronic)    Relevant Orders    Lipid Panel With / Chol / HDL Ratio    Comprehensive Metabolic Panel    Hypertension - Primary (Chronic)    Relevant Medications    hydrochlorothiazide (HYDRODIURIL) 25 MG tablet    nadolol (CORGARD) 40 MG tablet    Other Relevant Orders    Lipid Panel With / Chol / HDL Ratio    Comprehensive Metabolic Panel      Other Visit Diagnoses     Irritable bowel syndrome with diarrhea        Relevant Medications    dicyclomine (BENTYL) 20 MG tablet    Elevated vitamin B12 level        Relevant Orders    Vitamin B12    Bilateral hip pain                Follow up: Return in about 6 months (around 10/22/2019).     DISCUSSION  Hypertension.  Blood pressure is stable on current medications.  Check labs as noted.    Hyperlipidemia.  Recheck CMP and lipid panel.    Irritable bowel syndrome with diarrhea.  Will increase Bentyl to 20 mg as needed.  See prescription.    History of elevated B12 level.  Recheck B12.    Bilateral hip pain.  Probable arthritis.  He does not wish to have x-ray at this time unless worsening.      MEDICATIONS PRESCRIBED  Requested Prescriptions     Signed Prescriptions Disp Refills   • dicyclomine (BENTYL) 20 MG tablet 30 tablet 2     Sig: Take one po one hour before meal up to 4 times per day as needed   • hydrochlorothiazide (HYDRODIURIL) 25 MG tablet 90 tablet 1     Sig: Take 1 tablet by mouth Daily.   • nadolol (CORGARD) 40 MG tablet 90 tablet 1     Sig: Take 1 tablet by mouth Daily.          -------------------------------------------    Subjective     Chief Complaint   Patient presents with   • Hypertension     med refills & labs    • Hyperlipidemia   • Hip Pain         Hypertension   This is a chronic problem. The current episode started more than 1 year ago. The problem is unchanged. The problem is controlled. Pertinent negatives include no chest pain,  headaches, peripheral edema or shortness of breath. There are no associated agents to hypertension. Risk factors for coronary artery disease include dyslipidemia and smoking/tobacco exposure. Current antihypertension treatment includes ACE inhibitors and diuretics. There is no history of chronic renal disease.   Hyperlipidemia   This is a chronic problem. The current episode started more than 1 year ago. The problem is controlled. Recent lipid tests were reviewed and are normal. He has no history of chronic renal disease or diabetes. There are no known factors aggravating his hyperlipidemia. Pertinent negatives include no chest pain or shortness of breath. There are no compliance problems.    Hip Pain    The incident occurred more than 1 week ago. There was no injury mechanism. The pain is present in the right hip (left as well ( Right > Left). Quality: sharp pain , after 5 steps, pain is gone. The pain is moderate. The pain has been fluctuating since onset. The symptoms are aggravated by movement and weight bearing. He has tried NSAIDs for the symptoms. The treatment provided no relief.       Has to sit more and increased pain   Hurts more after sitting after a time    No xray of hip    Saw urology, was given flomax  Was told to take 1/2 before eating   Was to follow up but has not taken as much    Takes Bentyl 10 mg ( 2 helps better)  No side effects  Mexican food not as bad    Getting deep tissue massage x two and has helped with the pain in the shoulder blade      History of elevated B12 level greater than 2000.  Does not take any vitamins or supplements.    Social History     Tobacco Use   Smoking Status Current Every Day Smoker   • Packs/day: 1.00   • Years: 40.00   • Pack years: 40.00   • Types: Cigarettes   Smokeless Tobacco Never Used        Past Medical History,Medications, Allergies, and social history was reviewed.      Review of Systems   Constitutional: Negative.    HENT: Negative.    Respiratory:  "Negative.  Negative for shortness of breath.    Cardiovascular: Negative.  Negative for chest pain.   Gastrointestinal: Negative.    Musculoskeletal: Positive for arthralgias.   Psychiatric/Behavioral: Negative.        Objective     Vitals:    04/22/19 0951   BP: 118/72   Pulse: 64   Resp: 18   Temp: 97.8 °F (36.6 °C)   Weight: 107 kg (236 lb)   Height: 188 cm (74\")          Physical Exam   Constitutional: Vital signs are normal. He appears well-developed and well-nourished.   HENT:   Head: Normocephalic and atraumatic.   Right Ear: Hearing, tympanic membrane, external ear and ear canal normal.   Left Ear: Hearing, tympanic membrane, external ear and ear canal normal.   Mouth/Throat: Oropharynx is clear and moist.   Eyes: Conjunctivae, EOM and lids are normal. Pupils are equal, round, and reactive to light.   Neck: Normal range of motion. Neck supple. No thyromegaly present.   Cardiovascular: Normal rate, regular rhythm and normal heart sounds. Exam reveals no friction rub.   No murmur heard.  Pulmonary/Chest: Effort normal and breath sounds normal. No respiratory distress. He has no wheezes. He has no rales.   Abdominal: Soft. Normal appearance and bowel sounds are normal. He exhibits no distension and no mass. There is no tenderness. There is no rebound and no guarding.   Musculoskeletal: He exhibits no edema.        Right hip: He exhibits normal range of motion and no tenderness.        Left hip: He exhibits normal range of motion and no tenderness.   Neurological: He is alert. He has normal strength.   Skin: Skin is warm and dry.   Psychiatric: He has a normal mood and affect. His speech is normal. Cognition and memory are normal.   Nursing note and vitals reviewed.                Brent Botello MD    "

## 2019-04-23 DIAGNOSIS — N28.9 RENAL INSUFFICIENCY: Primary | ICD-10-CM

## 2019-04-23 LAB
ALBUMIN SERPL-MCNC: 4.2 G/DL (ref 3.5–5.2)
ALBUMIN/GLOB SERPL: 2 G/DL
ALP SERPL-CCNC: 82 U/L (ref 39–117)
ALT SERPL-CCNC: 23 U/L (ref 1–41)
AST SERPL-CCNC: 20 U/L (ref 1–40)
BILIRUB SERPL-MCNC: 0.9 MG/DL (ref 0.2–1.2)
BUN SERPL-MCNC: 16 MG/DL (ref 8–23)
BUN/CREAT SERPL: 12.3 (ref 7–25)
CALCIUM SERPL-MCNC: 9.5 MG/DL (ref 8.6–10.5)
CHLORIDE SERPL-SCNC: 99 MMOL/L (ref 98–107)
CHOLEST SERPL-MCNC: 151 MG/DL (ref 0–200)
CHOLEST/HDLC SERPL: 4.19 {RATIO}
CO2 SERPL-SCNC: 31 MMOL/L (ref 22–29)
CREAT SERPL-MCNC: 1.3 MG/DL (ref 0.76–1.27)
GLOBULIN SER CALC-MCNC: 2.1 GM/DL
GLUCOSE SERPL-MCNC: 93 MG/DL (ref 65–99)
HDLC SERPL-MCNC: 36 MG/DL (ref 40–60)
LDLC SERPL CALC-MCNC: 95 MG/DL (ref 0–100)
POTASSIUM SERPL-SCNC: 4.1 MMOL/L (ref 3.5–5.2)
PROT SERPL-MCNC: 6.3 G/DL (ref 6–8.5)
SODIUM SERPL-SCNC: 142 MMOL/L (ref 136–145)
TRIGL SERPL-MCNC: 100 MG/DL (ref 0–150)
VIT B12 SERPL-MCNC: 234 PG/ML (ref 211–946)
VLDLC SERPL CALC-MCNC: 20 MG/DL

## 2019-05-07 ENCOUNTER — RESULTS ENCOUNTER (OUTPATIENT)
Dept: FAMILY MEDICINE CLINIC | Facility: CLINIC | Age: 64
End: 2019-05-07

## 2019-05-07 DIAGNOSIS — N28.9 RENAL INSUFFICIENCY: ICD-10-CM

## 2019-06-08 LAB
ALBUMIN SERPL-MCNC: 4.1 G/DL (ref 3.5–5.2)
ALBUMIN/GLOB SERPL: 2 G/DL
ALP SERPL-CCNC: 81 U/L (ref 39–117)
ALT SERPL-CCNC: 21 U/L (ref 1–41)
AST SERPL-CCNC: 18 U/L (ref 1–40)
BILIRUB SERPL-MCNC: 0.4 MG/DL (ref 0.2–1.2)
BUN SERPL-MCNC: 17 MG/DL (ref 8–23)
BUN/CREAT SERPL: 14.2 (ref 7–25)
CALCIUM SERPL-MCNC: 9.9 MG/DL (ref 8.6–10.5)
CHLORIDE SERPL-SCNC: 98 MMOL/L (ref 98–107)
CO2 SERPL-SCNC: 30.8 MMOL/L (ref 22–29)
CREAT SERPL-MCNC: 1.2 MG/DL (ref 0.76–1.27)
GLOBULIN SER CALC-MCNC: 2.1 GM/DL
GLUCOSE SERPL-MCNC: 113 MG/DL (ref 65–99)
POTASSIUM SERPL-SCNC: 3.7 MMOL/L (ref 3.5–5.2)
PROT SERPL-MCNC: 6.2 G/DL (ref 6–8.5)
SODIUM SERPL-SCNC: 138 MMOL/L (ref 136–145)

## 2019-09-09 ENCOUNTER — OFFICE VISIT (OUTPATIENT)
Dept: FAMILY MEDICINE CLINIC | Facility: CLINIC | Age: 64
End: 2019-09-09

## 2019-09-09 VITALS
DIASTOLIC BLOOD PRESSURE: 72 MMHG | HEART RATE: 70 BPM | WEIGHT: 225 LBS | HEIGHT: 74 IN | BODY MASS INDEX: 28.88 KG/M2 | RESPIRATION RATE: 18 BRPM | SYSTOLIC BLOOD PRESSURE: 110 MMHG | TEMPERATURE: 97.8 F

## 2019-09-09 DIAGNOSIS — I10 ESSENTIAL HYPERTENSION: ICD-10-CM

## 2019-09-09 DIAGNOSIS — J06.9 PROTRACTED URI: Primary | ICD-10-CM

## 2019-09-09 DIAGNOSIS — J98.01 BRONCHOSPASM: ICD-10-CM

## 2019-09-09 PROCEDURE — 99213 OFFICE O/P EST LOW 20 MIN: CPT | Performed by: FAMILY MEDICINE

## 2019-09-09 RX ORDER — NADOLOL 40 MG/1
40 TABLET ORAL DAILY
Qty: 90 TABLET | Refills: 1 | Status: SHIPPED | OUTPATIENT
Start: 2019-09-09 | End: 2020-06-02

## 2019-09-09 RX ORDER — PREDNISONE 10 MG/1
TABLET ORAL
Qty: 20 TABLET | Refills: 0 | Status: SHIPPED | OUTPATIENT
Start: 2019-09-09 | End: 2019-12-09

## 2019-09-09 RX ORDER — HYDROCHLOROTHIAZIDE 25 MG/1
25 TABLET ORAL DAILY
Qty: 90 TABLET | Refills: 1 | Status: SHIPPED | OUTPATIENT
Start: 2019-09-09 | End: 2020-06-02

## 2019-09-09 RX ORDER — AZITHROMYCIN 250 MG/1
TABLET, FILM COATED ORAL
Qty: 6 TABLET | Refills: 0 | Status: SHIPPED | OUTPATIENT
Start: 2019-09-09 | End: 2019-12-09

## 2019-09-09 NOTE — PROGRESS NOTES
Assessment/Plan       Problems Addressed this Visit        Cardiovascular and Mediastinum    Hypertension (Chronic)    Relevant Medications    nadolol (CORGARD) 40 MG tablet    hydrochlorothiazide (HYDRODIURIL) 25 MG tablet      Other Visit Diagnoses     Protracted URI    -  Primary    Relevant Medications    azithromycin (ZITHROMAX) 250 MG tablet    predniSONE (DELTASONE) 10 MG tablet    Bronchospasm        Relevant Medications    azithromycin (ZITHROMAX) 250 MG tablet    predniSONE (DELTASONE) 10 MG tablet            Follow up: Return if symptoms worsen or fail to improve.     DISCUSSION  Persistent upper restaurant infection with bronchospasm.  Start Z-James and prednisone.  Side effects explained.  Increase fluids.  Rest.  Recommend stop smoking.  Call if not improving.    Hypertension stable.  Continue current medications.  Refilled medications.      MEDICATIONS PRESCRIBED  Requested Prescriptions     Signed Prescriptions Disp Refills   • azithromycin (ZITHROMAX) 250 MG tablet 6 tablet 0     Sig: Take 2 tablets the first day, then 1 tablet daily for 4 days.   • predniSONE (DELTASONE) 10 MG tablet 20 tablet 0     Si po x 2 days then 3 po daily x 2 days then 2 po daily x 2 days then 1 po daily x 2 days then stop.   • nadolol (CORGARD) 40 MG tablet 90 tablet 1     Sig: Take 1 tablet by mouth Daily.   • hydrochlorothiazide (HYDRODIURIL) 25 MG tablet 90 tablet 1     Sig: Take 1 tablet by mouth Daily.                -------------------------------------------    Subjective     Chief Complaint   Patient presents with   • URI     2 weeks    • Cough         URI    This is a new problem. Episode onset: x 2 weeks. The problem has been gradually worsening. Maximum temperature: 1 week ago, woke up with sweats. Associated symptoms include chest pain (from cough), congestion (cream colored), coughing ( cough is productive) and wheezing. Pertinent negatives include no nausea. Treatments tried: nyquil and dayquil. The  "treatment provided mild (but not getting better) relief.     Still has fecal urgency after eating  Bentyl not helping as needed  ? If can take daily    Hypertension.  Stable on current medications.  No side effects.  Needs refill.  No reported chest pain.  Breathing issues as noted above.        Social History     Tobacco Use   Smoking Status Current Every Day Smoker   • Packs/day: 1.00   • Years: 40.00   • Pack years: 40.00   • Types: Cigarettes   Smokeless Tobacco Never Used          Past Medical History,Medications, Allergies, and social history was reviewed.    Past Medical History:   Diagnosis Date   • Hyperlipidemia    • Renal cell adenoma of right kidney 2007            Review of Systems   HENT: Positive for congestion (cream colored).    Respiratory: Positive for cough ( cough is productive) and wheezing.    Cardiovascular: Positive for chest pain (from cough).   Gastrointestinal: Negative for nausea.       Objective     Vitals:    09/09/19 1524   BP: 110/72   Pulse: 70   Resp: 18   Temp: 97.8 °F (36.6 °C)   Weight: 102 kg (225 lb)   Height: 188 cm (74\")          Physical Exam   Constitutional: Vital signs are normal. He appears well-developed and well-nourished.   HENT:   Head: Normocephalic and atraumatic.   Right Ear: Hearing, tympanic membrane, external ear and ear canal normal.   Left Ear: Hearing, tympanic membrane, external ear and ear canal normal.   Mouth/Throat: Oropharynx is clear and moist.   Eyes: Conjunctivae, EOM and lids are normal. Pupils are equal, round, and reactive to light.   Neck: Normal range of motion. Neck supple. No thyromegaly present.   Cardiovascular: Normal rate, regular rhythm and normal heart sounds. Exam reveals no friction rub.   No murmur heard.  Pulmonary/Chest: Effort normal. No respiratory distress. He has wheezes ( Expiratory). He has no rales.   Abdominal: Normal appearance.   Musculoskeletal: He exhibits no edema.   Neurological: He is alert. He has normal strength. "   Skin: Skin is warm and dry.   Psychiatric: He has a normal mood and affect. His speech is normal. Cognition and memory are normal.   Nursing note and vitals reviewed.                Brent Botello MD

## 2019-12-09 ENCOUNTER — TELEPHONE (OUTPATIENT)
Dept: FAMILY MEDICINE CLINIC | Facility: CLINIC | Age: 64
End: 2019-12-09

## 2019-12-09 ENCOUNTER — OFFICE VISIT (OUTPATIENT)
Dept: FAMILY MEDICINE CLINIC | Facility: CLINIC | Age: 64
End: 2019-12-09

## 2019-12-09 VITALS
OXYGEN SATURATION: 98 % | RESPIRATION RATE: 20 BRPM | HEART RATE: 62 BPM | TEMPERATURE: 97 F | WEIGHT: 229.2 LBS | DIASTOLIC BLOOD PRESSURE: 76 MMHG | BODY MASS INDEX: 29.41 KG/M2 | SYSTOLIC BLOOD PRESSURE: 130 MMHG | HEIGHT: 74 IN

## 2019-12-09 DIAGNOSIS — E78.2 MIXED HYPERLIPIDEMIA: ICD-10-CM

## 2019-12-09 DIAGNOSIS — M25.551 RIGHT HIP PAIN: ICD-10-CM

## 2019-12-09 DIAGNOSIS — Z23 NEED FOR IMMUNIZATION AGAINST INFLUENZA: ICD-10-CM

## 2019-12-09 DIAGNOSIS — Z23 NEED FOR PNEUMOCOCCAL VACCINE: ICD-10-CM

## 2019-12-09 DIAGNOSIS — E53.8 B12 DEFICIENCY: ICD-10-CM

## 2019-12-09 DIAGNOSIS — I10 ESSENTIAL HYPERTENSION: Primary | ICD-10-CM

## 2019-12-09 PROCEDURE — 90670 PCV13 VACCINE IM: CPT | Performed by: FAMILY MEDICINE

## 2019-12-09 PROCEDURE — 99214 OFFICE O/P EST MOD 30 MIN: CPT | Performed by: FAMILY MEDICINE

## 2019-12-09 PROCEDURE — 90471 IMMUNIZATION ADMIN: CPT | Performed by: FAMILY MEDICINE

## 2019-12-09 PROCEDURE — 90674 CCIIV4 VAC NO PRSV 0.5 ML IM: CPT | Performed by: FAMILY MEDICINE

## 2019-12-09 PROCEDURE — 90472 IMMUNIZATION ADMIN EACH ADD: CPT | Performed by: FAMILY MEDICINE

## 2019-12-09 RX ORDER — DICLOFENAC SODIUM 75 MG/1
75 TABLET, DELAYED RELEASE ORAL 2 TIMES DAILY
Qty: 60 TABLET | Refills: 1 | Status: SHIPPED | OUTPATIENT
Start: 2019-12-09 | End: 2021-08-30

## 2019-12-09 NOTE — TELEPHONE ENCOUNTER
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 PLEASE  INCLUDE ANY MESSAGE FOR APPT AND  ETC IN NOTE.

## 2019-12-09 NOTE — PROGRESS NOTES
Assessment/Plan       Problems Addressed this Visit        Cardiovascular and Mediastinum    Hyperlipidemia (Chronic)    Relevant Orders    Comprehensive Metabolic Panel    Lipid Panel With / Chol / HDL Ratio    Hypertension - Primary (Chronic)    Relevant Orders    CBC & Differential      Other Visit Diagnoses     B12 deficiency        Relevant Orders    Vitamin B12    Right hip pain        Relevant Medications    diclofenac (VOLTAREN) 75 MG EC tablet    Need for pneumococcal vaccine        Relevant Orders    Pneumococcal Conjugate Vaccine 13-Valent All (PCV13) (Completed)    Need for immunization against influenza        Relevant Orders    Flucelvax Quad (PFS) =>4yrs (4276-1755) (Completed)            Follow up: Return for follow up depends on review of labs and testing.     DISCUSSION   Hypertension.  Blood pressures currently controlled.  Continue medication.  Check labs as noted.      Hyperlipidemia.  Check CMP and lipid panel.    B12 deficiency.  Recheck B12 level.    Right hip pain.  May be bursitis versus arthritis.  Continue and will try diclofenac.  Do not take with ibuprofen.  If not getting better, may need x-ray.    Pneumococcal 13 today.  Pneumococcal 23 in 1 year.  Flu shot today as well.  We are out of high-dose and he agrees to have the regular flu shot so that he can get this done.               MEDICATIONS PRESCRIBED  Requested Prescriptions     Signed Prescriptions Disp Refills   • diclofenac (VOLTAREN) 75 MG EC tablet 60 tablet 1     Sig: Take 1 tablet by mouth 2 (Two) Times a Day.            -------------------------------------------    Subjective     Chief Complaint   Patient presents with   • Hypertension     follow up    • Hip Pain     Right worse than left started 6-8months ago   • Jaundice     concerned about eyes being yellowish         Hypertension   This is a chronic problem. The current episode started more than 1 year ago. The problem is unchanged. The problem is controlled.  Pertinent negatives include no chest pain, headaches, peripheral edema or shortness of breath. There are no associated agents to hypertension. Risk factors for coronary artery disease include dyslipidemia and smoking/tobacco exposure. Current antihypertension treatment includes ACE inhibitors and diuretics. There is no history of chronic renal disease.   Hyperlipidemia   This is a chronic problem. The current episode started more than 1 year ago. The problem is controlled. Recent lipid tests were reviewed and are normal. He has no history of chronic renal disease or diabetes. There are no known factors aggravating his hyperlipidemia. Pertinent negatives include no chest pain or shortness of breath. There are no compliance problems.        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 )  Stress    going to try Wellbutrin after geovanny and quit smoking  1 ppd ( 25 cig per day)                  Social History     Tobacco Use   Smoking Status Current Every Day Smoker   • Packs/day: 1.00   • Years: 40.00   • Pack years: 40.00   • Types: Cigarettes   Smokeless Tobacco Never Used          Past Medical History,Medications, Allergies, and social history was reviewed.          Review of Systems   Constitutional: Negative.    HENT: Negative.    Respiratory: Negative.  Negative for shortness of breath.    Cardiovascular: Negative.  Negative for chest pain.   Gastrointestinal: Negative.    Musculoskeletal: Positive for arthralgias.   Neurological: Negative.    Psychiatric/Behavioral: Negative.  Positive for  "stress.       Objective     Vitals:    12/09/19 1024   BP: 130/76   Pulse: 62   Resp: 20   Temp: 97 °F (36.1 °C)   TempSrc: Temporal   SpO2: 98%   Weight: 104 kg (229 lb 3.2 oz)   Height: 188 cm (74\")          Physical Exam   Constitutional: Vital signs are normal. He appears well-developed and well-nourished.   HENT:   Head: Normocephalic and atraumatic.   Right Ear: Hearing, tympanic membrane, external ear and ear canal normal.   Left Ear: Hearing, tympanic membrane, external ear and ear canal normal.   Mouth/Throat: Oropharynx is clear and moist.   Eyes: Pupils are equal, round, and reactive to light. Conjunctivae, EOM and lids are normal.   Neck: Normal range of motion. Neck supple. No thyromegaly present.   Cardiovascular: Normal rate, regular rhythm and normal heart sounds. Exam reveals no friction rub.   No murmur heard.  Pulmonary/Chest: Effort normal and breath sounds normal. No respiratory distress. He has no wheezes. He has no rales.   Abdominal: Soft. Normal appearance and bowel sounds are normal. He exhibits no distension and no mass. There is no tenderness. There is no rebound and no guarding.   Musculoskeletal: He exhibits no edema.        Right hip: He exhibits tenderness ( Laterally). He exhibits normal range of motion ( Antalgic gait).   Neurological: He is alert. He has normal strength.   Skin: Skin is warm and dry.   Psychiatric: He has a normal mood and affect. His speech is normal. Cognition and memory are normal.   Nursing note and vitals reviewed.                Brent Botello MD    "

## 2019-12-10 LAB
ALBUMIN SERPL-MCNC: 4.1 G/DL (ref 3.5–5.2)
ALBUMIN/GLOB SERPL: 2.4 G/DL
ALP SERPL-CCNC: 68 U/L (ref 39–117)
ALT SERPL-CCNC: 16 U/L (ref 1–41)
AST SERPL-CCNC: 19 U/L (ref 1–40)
BASOPHILS # BLD AUTO: 0.04 10*3/MM3 (ref 0–0.2)
BASOPHILS NFR BLD AUTO: 0.7 % (ref 0–1.5)
BILIRUB SERPL-MCNC: 1.2 MG/DL (ref 0.2–1.2)
BUN SERPL-MCNC: 13 MG/DL (ref 8–23)
BUN/CREAT SERPL: 11.5 (ref 7–25)
CALCIUM SERPL-MCNC: 9 MG/DL (ref 8.6–10.5)
CHLORIDE SERPL-SCNC: 95 MMOL/L (ref 98–107)
CHOLEST SERPL-MCNC: 147 MG/DL (ref 0–200)
CHOLEST/HDLC SERPL: 4.32 {RATIO}
CO2 SERPL-SCNC: 30.7 MMOL/L (ref 22–29)
CREAT SERPL-MCNC: 1.13 MG/DL (ref 0.76–1.27)
EOSINOPHIL # BLD AUTO: 0.12 10*3/MM3 (ref 0–0.4)
EOSINOPHIL NFR BLD AUTO: 2.1 % (ref 0.3–6.2)
ERYTHROCYTE [DISTWIDTH] IN BLOOD BY AUTOMATED COUNT: 13.1 % (ref 12.3–15.4)
GLOBULIN SER CALC-MCNC: 1.7 GM/DL
GLUCOSE SERPL-MCNC: 84 MG/DL (ref 65–99)
HCT VFR BLD AUTO: 45.9 % (ref 37.5–51)
HDLC SERPL-MCNC: 34 MG/DL (ref 40–60)
HGB BLD-MCNC: 16 G/DL (ref 13–17.7)
IMM GRANULOCYTES # BLD AUTO: 0.01 10*3/MM3 (ref 0–0.05)
IMM GRANULOCYTES NFR BLD AUTO: 0.2 % (ref 0–0.5)
LDLC SERPL CALC-MCNC: 89 MG/DL (ref 0–100)
LYMPHOCYTES # BLD AUTO: 1.44 10*3/MM3 (ref 0.7–3.1)
LYMPHOCYTES NFR BLD AUTO: 25 % (ref 19.6–45.3)
MCH RBC QN AUTO: 32.7 PG (ref 26.6–33)
MCHC RBC AUTO-ENTMCNC: 34.9 G/DL (ref 31.5–35.7)
MCV RBC AUTO: 93.7 FL (ref 79–97)
MONOCYTES # BLD AUTO: 0.54 10*3/MM3 (ref 0.1–0.9)
MONOCYTES NFR BLD AUTO: 9.4 % (ref 5–12)
NEUTROPHILS # BLD AUTO: 3.6 10*3/MM3 (ref 1.7–7)
NEUTROPHILS NFR BLD AUTO: 62.6 % (ref 42.7–76)
NRBC BLD AUTO-RTO: 0 /100 WBC (ref 0–0.2)
PLATELET # BLD AUTO: 207 10*3/MM3 (ref 140–450)
POTASSIUM SERPL-SCNC: 3.8 MMOL/L (ref 3.5–5.2)
PROT SERPL-MCNC: 5.8 G/DL (ref 6–8.5)
RBC # BLD AUTO: 4.9 10*6/MM3 (ref 4.14–5.8)
SODIUM SERPL-SCNC: 140 MMOL/L (ref 136–145)
TRIGL SERPL-MCNC: 120 MG/DL (ref 0–150)
VIT B12 SERPL-MCNC: 233 PG/ML (ref 211–946)
VLDLC SERPL CALC-MCNC: 24 MG/DL
WBC # BLD AUTO: 5.75 10*3/MM3 (ref 3.4–10.8)

## 2020-01-08 ENCOUNTER — TELEPHONE (OUTPATIENT)
Dept: FAMILY MEDICINE CLINIC | Facility: CLINIC | Age: 65
End: 2020-01-08

## 2020-01-08 NOTE — TELEPHONE ENCOUNTER
Pt called and stated that he would like a hard copy of his lab results. Pt will be in sometime this week obtain copy. Pt call back [phone]

## 2020-04-01 ENCOUNTER — TELEPHONE (OUTPATIENT)
Dept: FAMILY MEDICINE CLINIC | Facility: CLINIC | Age: 65
End: 2020-04-01

## 2020-04-02 NOTE — TELEPHONE ENCOUNTER
Called spoke with wife she stated that he also takes care of his 95 yr old mother in law and they even more scream because of that. Would like letter Til April 20 th after that he has vacation time he is using then should be retiring.

## 2020-04-02 NOTE — TELEPHONE ENCOUNTER
Please call, increased risk would be due to his age mainly and history of cancer although it is not active at this time. I can write a letter to be off due to increased risk of complications due to his age. Let me know if that is ok and does he want to do til the end of the month for now?

## 2020-06-02 DIAGNOSIS — I10 ESSENTIAL HYPERTENSION: ICD-10-CM

## 2020-06-02 RX ORDER — HYDROCHLOROTHIAZIDE 25 MG/1
TABLET ORAL
Qty: 30 TABLET | Refills: 0 | Status: SHIPPED | OUTPATIENT
Start: 2020-06-02 | End: 2020-06-25

## 2020-06-02 RX ORDER — NADOLOL 40 MG/1
TABLET ORAL
Qty: 30 TABLET | Refills: 0 | Status: SHIPPED | OUTPATIENT
Start: 2020-06-02 | End: 2020-06-25

## 2020-06-25 DIAGNOSIS — I10 ESSENTIAL HYPERTENSION: ICD-10-CM

## 2020-06-25 RX ORDER — HYDROCHLOROTHIAZIDE 25 MG/1
TABLET ORAL
Qty: 30 TABLET | Refills: 0 | Status: SHIPPED | OUTPATIENT
Start: 2020-06-25 | End: 2020-07-22

## 2020-06-25 RX ORDER — NADOLOL 40 MG/1
TABLET ORAL
Qty: 30 TABLET | Refills: 0 | Status: SHIPPED | OUTPATIENT
Start: 2020-06-25 | End: 2020-07-22

## 2020-07-22 DIAGNOSIS — I10 ESSENTIAL HYPERTENSION: ICD-10-CM

## 2020-07-22 RX ORDER — HYDROCHLOROTHIAZIDE 25 MG/1
TABLET ORAL
Qty: 14 TABLET | Refills: 0 | Status: SHIPPED | OUTPATIENT
Start: 2020-07-22 | End: 2020-08-24

## 2020-07-22 RX ORDER — NADOLOL 40 MG/1
TABLET ORAL
Qty: 14 TABLET | Refills: 0 | Status: SHIPPED | OUTPATIENT
Start: 2020-07-22 | End: 2020-08-24

## 2020-08-23 DIAGNOSIS — I10 ESSENTIAL HYPERTENSION: ICD-10-CM

## 2020-08-24 RX ORDER — NADOLOL 40 MG/1
TABLET ORAL
Qty: 30 TABLET | Refills: 0 | Status: SHIPPED | OUTPATIENT
Start: 2020-08-24 | End: 2020-09-10 | Stop reason: SDUPTHER

## 2020-08-24 RX ORDER — HYDROCHLOROTHIAZIDE 25 MG/1
TABLET ORAL
Qty: 30 TABLET | Refills: 0 | Status: SHIPPED | OUTPATIENT
Start: 2020-08-24 | End: 2020-09-10 | Stop reason: SDUPTHER

## 2020-09-10 ENCOUNTER — TELEPHONE (OUTPATIENT)
Dept: FAMILY MEDICINE CLINIC | Facility: CLINIC | Age: 65
End: 2020-09-10

## 2020-09-10 ENCOUNTER — HOSPITAL ENCOUNTER (OUTPATIENT)
Dept: GENERAL RADIOLOGY | Facility: HOSPITAL | Age: 65
Discharge: HOME | End: 2020-09-10
Admitting: FAMILY MEDICINE

## 2020-09-10 ENCOUNTER — OFFICE VISIT (OUTPATIENT)
Dept: FAMILY MEDICINE CLINIC | Facility: CLINIC | Age: 65
End: 2020-09-10

## 2020-09-10 VITALS
HEART RATE: 70 BPM | TEMPERATURE: 97.7 F | RESPIRATION RATE: 18 BRPM | BODY MASS INDEX: 29.77 KG/M2 | DIASTOLIC BLOOD PRESSURE: 76 MMHG | HEIGHT: 74 IN | WEIGHT: 232 LBS | SYSTOLIC BLOOD PRESSURE: 126 MMHG

## 2020-09-10 DIAGNOSIS — R05.9 COUGH: ICD-10-CM

## 2020-09-10 DIAGNOSIS — I10 ESSENTIAL HYPERTENSION: Primary | ICD-10-CM

## 2020-09-10 DIAGNOSIS — E53.8 B12 DEFICIENCY: ICD-10-CM

## 2020-09-10 DIAGNOSIS — M25.552 BILATERAL HIP PAIN: ICD-10-CM

## 2020-09-10 DIAGNOSIS — E78.2 MIXED HYPERLIPIDEMIA: ICD-10-CM

## 2020-09-10 DIAGNOSIS — M25.551 BILATERAL HIP PAIN: ICD-10-CM

## 2020-09-10 PROCEDURE — 99214 OFFICE O/P EST MOD 30 MIN: CPT | Performed by: FAMILY MEDICINE

## 2020-09-10 PROCEDURE — 71046 X-RAY EXAM CHEST 2 VIEWS: CPT

## 2020-09-10 PROCEDURE — 73521 X-RAY EXAM HIPS BI 2 VIEWS: CPT

## 2020-09-10 RX ORDER — PENICILLIN V POTASSIUM 500 MG/1
TABLET ORAL
COMMUNITY
Start: 2020-09-03 | End: 2022-02-15

## 2020-09-10 RX ORDER — TAMSULOSIN HYDROCHLORIDE 0.4 MG/1
1 CAPSULE ORAL DAILY
Qty: 90 CAPSULE | Refills: 1 | Status: SHIPPED | OUTPATIENT
Start: 2020-09-10

## 2020-09-10 RX ORDER — NADOLOL 40 MG/1
40 TABLET ORAL DAILY
Qty: 90 TABLET | Refills: 1 | Status: SHIPPED | OUTPATIENT
Start: 2020-09-10 | End: 2021-03-31

## 2020-09-10 RX ORDER — HYDROCHLOROTHIAZIDE 25 MG/1
25 TABLET ORAL DAILY
Qty: 90 TABLET | Refills: 1 | Status: SHIPPED | OUTPATIENT
Start: 2020-09-10 | End: 2021-03-31

## 2020-09-10 NOTE — PROGRESS NOTES
Assessment/Plan       Problems Addressed this Visit        Cardiovascular and Mediastinum    Hyperlipidemia (Chronic)    Hypertension - Primary (Chronic)    Relevant Medications    hydroCHLOROthiazide (HYDRODIURIL) 25 MG tablet    nadolol (CORGARD) 40 MG tablet      Other Visit Diagnoses     B12 deficiency        Cough        Relevant Orders    XR Chest PA & Lateral (Completed)    Bilateral hip pain        Relevant Orders    XR Hips Bilateral With or Without Pelvis 2 View            Follow up: Return in about 6 months (around 3/10/2021).     DISCUSSION  Hypertension.  Blood pressure stable on current medications.    Mixed hyperlipidemia.  Due for blood work.     B12 deficiency.  Stable.  Due for blood work as well.    Chronic cough.  Check chest x-ray.    Bilateral hip pain.  Check hip x-ray.    We will call urologist office, Dr. Lomeli, for recent blood work to determine if PSA is needed and then will order labs.    Skin lesions consistent with seborrheic keratosis.  Continue to monitor.      MEDICATIONS PRESCRIBED  Requested Prescriptions     Signed Prescriptions Disp Refills   • hydroCHLOROthiazide (HYDRODIURIL) 25 MG tablet 90 tablet 1     Sig: Take 1 tablet by mouth Daily.   • nadolol (CORGARD) 40 MG tablet 90 tablet 1     Sig: Take 1 tablet by mouth Daily.   • tamsulosin (Flomax) 0.4 MG capsule 24 hr capsule 90 capsule 1     Sig: Take 1 capsule by mouth Daily.            -------------------------------------------    Subjective     Chief Complaint   Patient presents with   • Hypertension   • Med Refill   • Skin Problem         Hypertension   This is a chronic problem. The current episode started more than 1 year ago. The problem is unchanged. The problem is controlled. Associated symptoms include malaise/fatigue (stay tired). Pertinent negatives include no chest pain, headaches, peripheral edema or shortness of breath. There are no associated agents to hypertension. Risk factors for coronary artery disease  include dyslipidemia and smoking/tobacco exposure. Current antihypertension treatment includes ACE inhibitors and diuretics. The current treatment provides moderate improvement. There is no history of chronic renal disease.   Skin Problem   This is a new (scaly moles and wants to have checked. ) problem. Associated symptoms include arthralgias and coughing. Pertinent negatives include no chest pain or headaches.   Hyperlipidemia   This is a chronic problem. The current episode started more than 1 year ago. The problem is controlled. Recent lipid tests were reviewed and are normal. He has no history of chronic renal disease or diabetes. There are no known factors aggravating his hyperlipidemia. Pertinent negatives include no chest pain or shortness of breath. There are no compliance problems.        Has retired from [employer]    Has not been doing much  Has 24 acres and has been busy with that      Cough all the time  Has not had a chest xray  Right rib pain   Dry cough  + tob 1 ppd   Wellbutrin for one week and was not ready so stopped med.         Hip pain   Bilateral pain   Right is worse  Eases after he gets moving  Wakes him up during sleep    On PCN for tooth abscess  Just started            Social History     Tobacco Use   Smoking Status Current Every Day Smoker   • Packs/day: 1.00   • Years: 40.00   • Pack years: 40.00   • Types: Cigarettes   Smokeless Tobacco Never Used          Past Medical History,Medications, Allergies, and social history was reviewed.          Review of Systems   Constitutional: Positive for malaise/fatigue (stay tired).   HENT: Negative.    Respiratory: Positive for cough. Negative for shortness of breath.    Cardiovascular: Negative.  Negative for chest pain.   Gastrointestinal: Negative.    Musculoskeletal: Positive for arthralgias.   Skin: Positive for skin lesions.   Neurological: Negative.    Psychiatric/Behavioral: Negative.        Objective     Vitals:    09/10/20 1414   BP:  "126/76   Pulse: 70   Resp: 18   Temp: 97.7 °F (36.5 °C)   Weight: 105 kg (232 lb)   Height: 188 cm (74\")          Physical Exam   Constitutional: He is oriented to person, place, and time. Vital signs are normal. He appears well-developed and well-nourished.   HENT:   Head: Normocephalic and atraumatic.   Right Ear: Hearing, tympanic membrane, external ear and ear canal normal.   Left Ear: Hearing, tympanic membrane, external ear and ear canal normal.   Mouth/Throat: Oropharynx is clear and moist.   Eyes: Pupils are equal, round, and reactive to light. Conjunctivae, EOM and lids are normal.   Neck: Normal range of motion. Neck supple. No thyromegaly present.   Cardiovascular: Normal rate, regular rhythm and normal heart sounds. Exam reveals no friction rub.   No murmur heard.  Pulmonary/Chest: Effort normal and breath sounds normal. No respiratory distress. He has no wheezes. He has no rales.   Abdominal: Soft. Normal appearance and bowel sounds are normal. He exhibits no distension and no mass. There is no tenderness. There is no rebound and no guarding.   Musculoskeletal: He exhibits no edema.   Neurological: He is alert and oriented to person, place, and time. He has normal strength.   Skin: Skin is warm and dry.   Right lateral chest wall is a slightly raised waxy lesion consistent with seborrheic keratosis and similar one on the left chest wall.   Psychiatric: He has a normal mood and affect. His speech is normal and behavior is normal. Cognition and memory are normal.   Nursing note and vitals reviewed.                Brent Botello MD    "

## 2020-09-15 NOTE — TELEPHONE ENCOUNTER
Please call back to Dr. Lomeli's office.  We need to specifically know if they did any blood work including PSA.  Patient recalls that blood work was done but the only faxed a urinalysis.

## 2020-09-15 NOTE — TELEPHONE ENCOUNTER
Please call again for labs from Dr. Lomeli with urology.  Need this that we can determine what labs I need to order.

## 2020-09-22 NOTE — TELEPHONE ENCOUNTER
Please call patient.  We are not getting back any PSA level from Dr. Lomeli's office.  We have faxed several requests to them.  If you would like to go ahead and come in and get the blood work, I can place an order for that.  Please let me know.

## 2020-09-23 NOTE — TELEPHONE ENCOUNTER
Spoke with patient stated that he would wait til November for his appointment with Dr. Lomeli and have him draw for sure then.

## 2020-11-17 ENCOUNTER — FLU SHOT (OUTPATIENT)
Dept: FAMILY MEDICINE CLINIC | Facility: CLINIC | Age: 65
End: 2020-11-17

## 2020-11-17 DIAGNOSIS — Z23 NEED FOR INFLUENZA VACCINATION: ICD-10-CM

## 2020-11-17 PROCEDURE — 90471 IMMUNIZATION ADMIN: CPT | Performed by: FAMILY MEDICINE

## 2020-11-17 PROCEDURE — 90694 VACC AIIV4 NO PRSRV 0.5ML IM: CPT | Performed by: FAMILY MEDICINE

## 2021-03-31 DIAGNOSIS — I10 ESSENTIAL HYPERTENSION: ICD-10-CM

## 2021-03-31 RX ORDER — HYDROCHLOROTHIAZIDE 25 MG/1
TABLET ORAL
Qty: 90 TABLET | Refills: 1 | Status: SHIPPED | OUTPATIENT
Start: 2021-03-31 | End: 2021-08-23

## 2021-03-31 RX ORDER — NADOLOL 40 MG/1
TABLET ORAL
Qty: 90 TABLET | Refills: 1 | Status: SHIPPED | OUTPATIENT
Start: 2021-03-31 | End: 2021-08-23

## 2021-04-01 ENCOUNTER — TELEPHONE (OUTPATIENT)
Dept: FAMILY MEDICINE CLINIC | Facility: CLINIC | Age: 66
End: 2021-04-01

## 2021-04-01 ENCOUNTER — OFFICE VISIT (OUTPATIENT)
Dept: FAMILY MEDICINE CLINIC | Facility: CLINIC | Age: 66
End: 2021-04-01

## 2021-04-01 VITALS
WEIGHT: 227 LBS | TEMPERATURE: 98.4 F | DIASTOLIC BLOOD PRESSURE: 84 MMHG | RESPIRATION RATE: 18 BRPM | BODY MASS INDEX: 29.13 KG/M2 | HEART RATE: 72 BPM | HEIGHT: 74 IN | SYSTOLIC BLOOD PRESSURE: 132 MMHG

## 2021-04-01 DIAGNOSIS — M89.8X9 BONE PAIN: ICD-10-CM

## 2021-04-01 DIAGNOSIS — M25.552 BILATERAL HIP PAIN: ICD-10-CM

## 2021-04-01 DIAGNOSIS — E78.2 MIXED HYPERLIPIDEMIA: ICD-10-CM

## 2021-04-01 DIAGNOSIS — E53.8 B12 DEFICIENCY: ICD-10-CM

## 2021-04-01 DIAGNOSIS — I10 ESSENTIAL HYPERTENSION: Primary | ICD-10-CM

## 2021-04-01 DIAGNOSIS — M25.551 BILATERAL HIP PAIN: ICD-10-CM

## 2021-04-01 PROCEDURE — 99214 OFFICE O/P EST MOD 30 MIN: CPT | Performed by: FAMILY MEDICINE

## 2021-04-01 NOTE — TELEPHONE ENCOUNTER
Patient had colonoscopy there 12/23/2013. He had a couple of hyperplastic polyps. Their office is faxing the report.

## 2021-04-01 NOTE — PROGRESS NOTES
Assessment/Plan       Diagnoses and all orders for this visit:    1. Essential hypertension (Primary)  -     CBC & Differential  -     Comprehensive Metabolic Panel    2. Mixed hyperlipidemia  -     Comprehensive Metabolic Panel  -     Lipid Panel With / Chol / HDL Ratio    3. B12 deficiency  -     Vitamin B12  -     Methylmalonic Acid, Serum    4. Bilateral hip pain  -     Vitamin D 1,25 Dihydroxy           Follow up: Return in about 6 months (around 10/1/2021) for follow up depends on review of labs and testing.     DISCUSSION  Hypertension.  Blood pressure stable.  Continue hydrochlorothiazide 25 mg daily, nadolol 40 mg daily.  Check CBC and CMP.    Hyperlipidemia.  Check CMP and lipid panel.    B12 deficiency.  Check B12 level.  Has been mildly low.  Will check methylmalonic acid as well.  Consider oral replacement versus injections pending results.    Hip pain.  Chronic.  Check vitamin D level.          MEDICATIONS PRESCRIBED  Requested Prescriptions      No prescriptions requested or ordered in this encounter                -------------------------------------------    Subjective     Chief Complaint   Patient presents with   • Hypertension     6 month f/u. check B12?         Hypertension  This is a chronic problem. The current episode started more than 1 year ago. The problem is unchanged. The problem is controlled. Associated symptoms include malaise/fatigue (stay tired , no change). Pertinent negatives include no chest pain, headaches, peripheral edema or shortness of breath. There are no associated agents to hypertension. Risk factors for coronary artery disease include dyslipidemia and smoking/tobacco exposure. Current antihypertension treatment includes ACE inhibitors and diuretics. The current treatment provides moderate improvement. There is no history of chronic renal disease.   Hyperlipidemia  This is a chronic problem. The current episode started more than 1 year ago. The problem is controlled.  "Recent lipid tests were reviewed and are normal. He has no history of chronic renal disease or diabetes. There are no known factors aggravating his hyperlipidemia. Pertinent negatives include no chest pain or shortness of breath. There are no compliance problems.        Low B12  Was taking and then stopped it     Sees urology  On prostate supplement  Less urination  Last PSA 1.9 , Oct 2020  Dr Thacker        Social History     Tobacco Use   Smoking Status Current Every Day Smoker   • Packs/day: 1.00   • Years: 40.00   • Pack years: 40.00   • Types: Cigarettes   Smokeless Tobacco Never Used          Past Medical History,Medications, Allergies, and social history was reviewed.          Review of Systems   Constitutional: Positive for malaise/fatigue (stay tired , no change).   HENT: Negative.    Respiratory: Positive for cough. Negative for shortness of breath.    Cardiovascular: Negative.  Negative for chest pain.   Genitourinary: Positive for difficulty urinating.   Musculoskeletal: Positive for arthralgias.   Psychiatric/Behavioral: Negative.        Objective     Vitals:    04/01/21 1123   BP: 132/84   Pulse: 72   Resp: 18   Temp: 98.4 °F (36.9 °C)   Weight: 103 kg (227 lb)   Height: 188 cm (74\")          Physical Exam  Vitals and nursing note reviewed.   Constitutional:       General: He is not in acute distress.     Appearance: Normal appearance. He is well-developed. He is not ill-appearing.   HENT:      Head: Normocephalic and atraumatic.      Right Ear: Hearing, tympanic membrane, ear canal and external ear normal.      Left Ear: Hearing, tympanic membrane, ear canal and external ear normal.      Nose: Nose normal. No congestion or rhinorrhea.      Mouth/Throat:      Mouth: Mucous membranes are moist.      Pharynx: No oropharyngeal exudate or posterior oropharyngeal erythema.   Eyes:      General: Lids are normal.      Conjunctiva/sclera: Conjunctivae normal.      Pupils: Pupils are equal, round, and reactive to " light.   Neck:      Thyroid: No thyromegaly.   Cardiovascular:      Rate and Rhythm: Normal rate and regular rhythm.      Heart sounds: Normal heart sounds. No murmur heard.   No friction rub.   Pulmonary:      Effort: Pulmonary effort is normal. No respiratory distress.      Breath sounds: Normal breath sounds. No wheezing or rales.   Abdominal:      General: Bowel sounds are normal. There is no distension.      Palpations: Abdomen is soft. There is no mass.      Tenderness: There is no abdominal tenderness. There is no guarding or rebound.   Musculoskeletal:      Cervical back: Normal range of motion and neck supple.      Right lower leg: No edema.      Left lower leg: No edema.   Skin:     General: Skin is warm and dry.   Neurological:      General: No focal deficit present.      Mental Status: He is alert.   Psychiatric:         Mood and Affect: Mood normal.         Speech: Speech normal.         Behavior: Behavior normal.                     Brent Botello MD

## 2021-04-08 LAB
1,25(OH)2D SERPL-MCNC: 41.8 PG/ML (ref 19.9–79.3)
ALBUMIN SERPL-MCNC: 4.2 G/DL (ref 3.5–5.2)
ALBUMIN/GLOB SERPL: 2.1 G/DL
ALP SERPL-CCNC: 80 U/L (ref 39–117)
ALT SERPL-CCNC: 27 U/L (ref 1–41)
AST SERPL-CCNC: 27 U/L (ref 1–40)
BASOPHILS # BLD AUTO: 0.03 10*3/MM3 (ref 0–0.2)
BASOPHILS NFR BLD AUTO: 0.6 % (ref 0–1.5)
BILIRUB SERPL-MCNC: 1.4 MG/DL (ref 0–1.2)
BUN SERPL-MCNC: 16 MG/DL (ref 8–23)
BUN/CREAT SERPL: 16 (ref 7–25)
CALCIUM SERPL-MCNC: 9.1 MG/DL (ref 8.6–10.5)
CHLORIDE SERPL-SCNC: 96 MMOL/L (ref 98–107)
CHOLEST SERPL-MCNC: 156 MG/DL (ref 0–200)
CHOLEST/HDLC SERPL: 3.9 {RATIO}
CO2 SERPL-SCNC: 30.7 MMOL/L (ref 22–29)
CREAT SERPL-MCNC: 1 MG/DL (ref 0.76–1.27)
EOSINOPHIL # BLD AUTO: 0.14 10*3/MM3 (ref 0–0.4)
EOSINOPHIL NFR BLD AUTO: 2.7 % (ref 0.3–6.2)
ERYTHROCYTE [DISTWIDTH] IN BLOOD BY AUTOMATED COUNT: 12.8 % (ref 12.3–15.4)
GLOBULIN SER CALC-MCNC: 2 GM/DL
GLUCOSE SERPL-MCNC: 92 MG/DL (ref 65–99)
HCT VFR BLD AUTO: 45 % (ref 37.5–51)
HDLC SERPL-MCNC: 40 MG/DL (ref 40–60)
HGB BLD-MCNC: 15.4 G/DL (ref 13–17.7)
IMM GRANULOCYTES # BLD AUTO: 0.01 10*3/MM3 (ref 0–0.05)
IMM GRANULOCYTES NFR BLD AUTO: 0.2 % (ref 0–0.5)
LDLC SERPL CALC-MCNC: 92 MG/DL (ref 0–100)
LYMPHOCYTES # BLD AUTO: 1.25 10*3/MM3 (ref 0.7–3.1)
LYMPHOCYTES NFR BLD AUTO: 24.5 % (ref 19.6–45.3)
Lab: ABNORMAL
MCH RBC QN AUTO: 31.8 PG (ref 26.6–33)
MCHC RBC AUTO-ENTMCNC: 34.2 G/DL (ref 31.5–35.7)
MCV RBC AUTO: 93 FL (ref 79–97)
METHYLMALONATE SERPL-SCNC: 1507 NMOL/L (ref 0–378)
MONOCYTES # BLD AUTO: 0.5 10*3/MM3 (ref 0.1–0.9)
MONOCYTES NFR BLD AUTO: 9.8 % (ref 5–12)
NEUTROPHILS # BLD AUTO: 3.18 10*3/MM3 (ref 1.7–7)
NEUTROPHILS NFR BLD AUTO: 62.2 % (ref 42.7–76)
NRBC BLD AUTO-RTO: 0 /100 WBC (ref 0–0.2)
PLATELET # BLD AUTO: 200 10*3/MM3 (ref 140–450)
POTASSIUM SERPL-SCNC: 3.7 MMOL/L (ref 3.5–5.2)
PROT SERPL-MCNC: 6.2 G/DL (ref 6–8.5)
RBC # BLD AUTO: 4.84 10*6/MM3 (ref 4.14–5.8)
SODIUM SERPL-SCNC: 139 MMOL/L (ref 136–145)
TRIGL SERPL-MCNC: 134 MG/DL (ref 0–150)
VIT B12 SERPL-MCNC: 218 PG/ML (ref 211–946)
VLDLC SERPL CALC-MCNC: 24 MG/DL (ref 5–40)
WBC # BLD AUTO: 5.11 10*3/MM3 (ref 3.4–10.8)

## 2021-04-09 ENCOUNTER — TELEPHONE (OUTPATIENT)
Dept: FAMILY MEDICINE CLINIC | Facility: CLINIC | Age: 66
End: 2021-04-09

## 2021-04-09 DIAGNOSIS — E53.8 B12 DEFICIENCY: Primary | ICD-10-CM

## 2021-04-09 NOTE — TELEPHONE ENCOUNTER
Caller: Andrae Garza Jr.    Relationship to patient: Self    Best call back number:     Patient is needing: TEST RESULTS; PLACED ON HOLD IN OFFICE FOR A MINUTE, NOBODY CAME BACK TO CALL

## 2021-08-23 DIAGNOSIS — I10 ESSENTIAL HYPERTENSION: ICD-10-CM

## 2021-08-23 RX ORDER — NADOLOL 40 MG/1
TABLET ORAL
Qty: 90 TABLET | Refills: 1 | Status: SHIPPED | OUTPATIENT
Start: 2021-08-23 | End: 2021-11-08 | Stop reason: SDUPTHER

## 2021-08-23 RX ORDER — HYDROCHLOROTHIAZIDE 25 MG/1
TABLET ORAL
Qty: 90 TABLET | Refills: 1 | Status: SHIPPED | OUTPATIENT
Start: 2021-08-23 | End: 2021-11-08 | Stop reason: SDUPTHER

## 2021-08-29 ENCOUNTER — NURSE TRIAGE (OUTPATIENT)
Dept: CALL CENTER | Facility: HOSPITAL | Age: 66
End: 2021-08-29

## 2021-08-29 NOTE — TELEPHONE ENCOUNTER
"    Reason for Disposition  • Age > 50 years (Exception: occurred > 1 hour ago AND now feels completely fine)    Additional Information  • Negative: Still unconscious  • Negative: Difficult to awaken or acting confused (e.g., disoriented, slurred speech)  • Negative: Shock suspected (e.g., cold/pale/clammy skin, too weak to stand, low BP, rapid pulse)  • Negative: Difficulty breathing  • Negative: Bluish (or gray) lips or face now  • Negative: Chest pain  • Negative: Extra heart beats or heart is beating fast  (i.e.,\"palpitations\")  • Negative: Bleeding (e.g., vomiting blood, rectal bleeding or tarry stools, severe vaginal bleeding)(Exception: fainted from sight of small amount of blood; small cut or abrasion)  • Negative: Fainted suddenly after medicine, allergic food or bee sting    Answer Assessment - Initial Assessment Questions  1. ONSET: \"How long were you unconscious?\" (minutes) \"When did it happen?\"      Wife was next door , it was not witnessed is not sure how long he was out but is awake now  2. CONTENT: \"What happened during period of unconsciousness?\" (e.g., seizure activity)       He woke up in the floor with swollen eye, bloody hand, phone in the floor and thinks he hit head on chair  3. MENTAL STATUS: \"Alert and oriented now?\" (oriented x 3 = name, month, location)       Alert and oriented but does not remember what happened  4. TRIGGER: \"What do you think caused the fainting?\" \"What were you doing just before you fainted?\"  (e.g., exercise, sudden standing up, prolonged standing)      unknown  5. RECURRENT SYMPTOM: \"Have you ever passed out before?\" If Yes, ask: \"When was the last time?\" and \"What happened that time?\"       no  6. INJURY: \"Did you sustain any injury during the fall?\"       Yes as stated above  7. CARDIAC SYMPTOMS: \"Have you had any of the following symptoms: chest pain, difficulty breathing, palpitations?\"      none  8. NEUROLOGIC SYMPTOMS: \"Have you had any of the following " "symptoms: headache, numbness, vertigo, weakness?\"      none  9. GI SYMPTOMS: \"Have you had any of the following symptoms: abdominal pain, vomiting, diarrhea, blood in stools?\"      none  10. OTHER SYMPTOMS: \"Do you have any other symptoms?\"        /85  11. PREGNANCY: \"Is there any chance you are pregnant?\" \"When was your last menstrual period?\"        NA    Protocols used: FAINTING-ADULT-AH      "

## 2021-08-30 ENCOUNTER — TELEPHONE (OUTPATIENT)
Dept: FAMILY MEDICINE CLINIC | Facility: CLINIC | Age: 66
End: 2021-08-30

## 2021-08-30 ENCOUNTER — OFFICE VISIT (OUTPATIENT)
Dept: FAMILY MEDICINE CLINIC | Facility: CLINIC | Age: 66
End: 2021-08-30

## 2021-08-30 VITALS
WEIGHT: 224 LBS | HEIGHT: 74 IN | DIASTOLIC BLOOD PRESSURE: 78 MMHG | BODY MASS INDEX: 28.75 KG/M2 | SYSTOLIC BLOOD PRESSURE: 122 MMHG | HEART RATE: 70 BPM | RESPIRATION RATE: 18 BRPM | TEMPERATURE: 97.5 F

## 2021-08-30 DIAGNOSIS — S06.0X9A CONCUSSION WITH LOSS OF CONSCIOUSNESS, INITIAL ENCOUNTER: ICD-10-CM

## 2021-08-30 DIAGNOSIS — R55 SYNCOPE, UNSPECIFIED SYNCOPE TYPE: Primary | ICD-10-CM

## 2021-08-30 DIAGNOSIS — S00.83XA CONTUSION OF FACE, INITIAL ENCOUNTER: ICD-10-CM

## 2021-08-30 DIAGNOSIS — S00.11XA BLACK EYE OF RIGHT SIDE, INITIAL ENCOUNTER: ICD-10-CM

## 2021-08-30 DIAGNOSIS — M54.50 ACUTE MIDLINE LOW BACK PAIN WITHOUT SCIATICA: ICD-10-CM

## 2021-08-30 PROCEDURE — 99214 OFFICE O/P EST MOD 30 MIN: CPT | Performed by: FAMILY MEDICINE

## 2021-08-30 PROCEDURE — 93005 ELECTROCARDIOGRAM TRACING: CPT | Performed by: FAMILY MEDICINE

## 2021-08-30 RX ORDER — NAPROXEN 500 MG/1
500 TABLET ORAL 2 TIMES DAILY WITH MEALS
Qty: 60 TABLET | Refills: 0 | Status: SHIPPED | OUTPATIENT
Start: 2021-08-30 | End: 2022-02-17 | Stop reason: HOSPADM

## 2021-08-30 NOTE — PROGRESS NOTES
Subjective   Srikanth Garza Jr. is a 66 y.o. male.     History of Present Illness     He had syncopal episode while at home yesterday    He was trying to make a phone call and then had a syncopal event  He then remembers getting up from a recliner in another room and he had bruising around his right eye by that time  His memory of the direct events was poor and he is not sure how much time he lost  Then his wife came home and found him in bed around 2:15  He hit his right eye on something but they are not sure    When he got up in the recliner he then went to bed and told his wife he was not sure what was going on, he was a little confused  He was a little weakn and was not himself and he dids not eatr as much  His back was sore and so he did not want to move much yesterday.  Still a little sore but feling better    The following portions of the patient's history were reviewed and updated as appropriate: allergies, current medications, past family history, past medical history, past social history, past surgical history and problem list.    Review of Systems   Constitutional: Negative.    Respiratory: Negative.    Cardiovascular: Negative.    Neurological: Positive for headaches.   Psychiatric/Behavioral: Negative.        Objective   Physical Exam  Vitals and nursing note reviewed.   Constitutional:       General: He is not in acute distress.     Appearance: Normal appearance. He is well-developed.   HENT:      Head:     Cardiovascular:      Rate and Rhythm: Normal rate and regular rhythm.      Heart sounds: Normal heart sounds.   Pulmonary:      Effort: Pulmonary effort is normal.      Breath sounds: Normal breath sounds.   Neurological:      Mental Status: He is alert and oriented to person, place, and time.   Psychiatric:         Mood and Affect: Mood normal.         Behavior: Behavior normal.         Thought Content: Thought content normal.         Judgment: Judgment normal.       ECG 12 Lead    Date/Time:  8/30/2021 5:25 PM  Performed by: Nicholas Sweeney MD  Authorized by: Nicholas Sweeney MD   Comparison: not compared with previous ECG   Previous ECG: no previous ECG available  Rhythm: sinus rhythm  Conduction: left bundle branch block and 1st degree AV block  ST Segments: ST segments normal  T Waves: T waves normal    Clinical impression: abnormal EKG  Comments: Will request old EKG from cardiologist to compare.  No acute findings noted.              Assessment/Plan   Diagnoses and all orders for this visit:    1. Syncope, unspecified syncope type (Primary)  -     CT head wo contrast; Future  -     CBC & Differential  -     Comprehensive Metabolic Panel  -     Magnesium  -     EEG Awake or Drowsy Routine; Future    2. Contusion of face, initial encounter    3. Concussion with loss of consciousness, initial encounter    4. Black eye of right side, initial encounter    5. Acute midline low back pain without sciatica  -     naproxen (Naprosyn) 500 MG tablet; Take 1 tablet by mouth 2 (Two) Times a Day With Meals.  Dispense: 60 tablet; Refill: 0    Other orders  -     ECG 12 Lead    poor history as pt had poor recall of actual events and there was no observer there with him.  discussed with pt that this is hard to diagnosis since it has been several days since the event.  Will check CT head and EEG but no acute issues noted with EKG.  Will request old records to compare.  I feel he fell and had a concussion which could explain all symptoms.  Will see what other testing reveals.  He will f/u if this happens again.

## 2021-08-31 LAB
ALBUMIN SERPL-MCNC: 4.1 G/DL (ref 3.8–4.8)
ALBUMIN/GLOB SERPL: 2.2 {RATIO} (ref 1.2–2.2)
ALP SERPL-CCNC: 78 IU/L (ref 48–121)
ALT SERPL-CCNC: 23 IU/L (ref 0–44)
AST SERPL-CCNC: 26 IU/L (ref 0–40)
BASOPHILS # BLD AUTO: 0 X10E3/UL (ref 0–0.2)
BASOPHILS NFR BLD AUTO: 0 %
BILIRUB SERPL-MCNC: 0.5 MG/DL (ref 0–1.2)
BUN SERPL-MCNC: 18 MG/DL (ref 8–27)
BUN/CREAT SERPL: 14 (ref 10–24)
CALCIUM SERPL-MCNC: 9 MG/DL (ref 8.6–10.2)
CHLORIDE SERPL-SCNC: 97 MMOL/L (ref 96–106)
CO2 SERPL-SCNC: 25 MMOL/L (ref 20–29)
CREAT SERPL-MCNC: 1.27 MG/DL (ref 0.76–1.27)
EOSINOPHIL # BLD AUTO: 0.2 X10E3/UL (ref 0–0.4)
EOSINOPHIL NFR BLD AUTO: 3 %
ERYTHROCYTE [DISTWIDTH] IN BLOOD BY AUTOMATED COUNT: 11.9 % (ref 11.6–15.4)
GLOBULIN SER CALC-MCNC: 1.9 G/DL (ref 1.5–4.5)
GLUCOSE SERPL-MCNC: 92 MG/DL (ref 65–99)
HCT VFR BLD AUTO: 44.3 % (ref 37.5–51)
HGB BLD-MCNC: 15.4 G/DL (ref 13–17.7)
IMM GRANULOCYTES # BLD AUTO: 0 X10E3/UL (ref 0–0.1)
IMM GRANULOCYTES NFR BLD AUTO: 0 %
LYMPHOCYTES # BLD AUTO: 1.7 X10E3/UL (ref 0.7–3.1)
LYMPHOCYTES NFR BLD AUTO: 22 %
MAGNESIUM SERPL-MCNC: 1.7 MG/DL (ref 1.6–2.3)
MCH RBC QN AUTO: 32.1 PG (ref 26.6–33)
MCHC RBC AUTO-ENTMCNC: 34.8 G/DL (ref 31.5–35.7)
MCV RBC AUTO: 92 FL (ref 79–97)
MONOCYTES # BLD AUTO: 0.6 X10E3/UL (ref 0.1–0.9)
MONOCYTES NFR BLD AUTO: 8 %
NEUTROPHILS # BLD AUTO: 5.1 X10E3/UL (ref 1.4–7)
NEUTROPHILS NFR BLD AUTO: 67 %
PLATELET # BLD AUTO: 195 X10E3/UL (ref 150–450)
POTASSIUM SERPL-SCNC: 3.6 MMOL/L (ref 3.5–5.2)
PROT SERPL-MCNC: 6 G/DL (ref 6–8.5)
RBC # BLD AUTO: 4.8 X10E6/UL (ref 4.14–5.8)
SODIUM SERPL-SCNC: 135 MMOL/L (ref 134–144)
WBC # BLD AUTO: 7.6 X10E3/UL (ref 3.4–10.8)

## 2021-09-02 ENCOUNTER — TELEPHONE (OUTPATIENT)
Dept: FAMILY MEDICINE CLINIC | Facility: CLINIC | Age: 66
End: 2021-09-02

## 2021-09-02 NOTE — TELEPHONE ENCOUNTER
Caller: Srikanth Garza Jr.    Relationship: Self    Best call back number: [phone]    What is the medical concern/diagnosis:     What specialty or service is being requested: CT SCAN AND EEG    What is the provider, practice or medical service name:     What is the office location: [facility]    What is the office phone number:     Any additional details:

## 2021-09-03 NOTE — TELEPHONE ENCOUNTER
Spoke with pt wife. She was just wanting to schedule the appointment; advised that [facility] central scheduling will contact them to scheduled the EEG. The CT is already scheduled.

## 2021-09-03 NOTE — TELEPHONE ENCOUNTER
Please call and clarify message.  Is he trying to get this scheduled?  He saw Dr. Sweeney  and Dr. Sweeney  had ordered these.

## 2021-09-09 ENCOUNTER — HOSPITAL ENCOUNTER (OUTPATIENT)
Dept: CT IMAGING | Facility: HOSPITAL | Age: 66
Discharge: HOME | End: 2021-09-09
Admitting: FAMILY MEDICINE

## 2021-09-09 DIAGNOSIS — R55 SYNCOPE, UNSPECIFIED SYNCOPE TYPE: ICD-10-CM

## 2021-09-09 PROCEDURE — 70450 CT HEAD/BRAIN W/O DYE: CPT

## 2021-09-16 ENCOUNTER — TELEPHONE (OUTPATIENT)
Dept: FAMILY MEDICINE CLINIC | Facility: CLINIC | Age: 66
End: 2021-09-16

## 2021-09-16 DIAGNOSIS — R55 SYNCOPE, UNSPECIFIED SYNCOPE TYPE: Primary | ICD-10-CM

## 2021-09-16 NOTE — TELEPHONE ENCOUNTER
See other.  Patient notified of normal EEG results.  Referring to cardiology given syncopal episode and low blood pressure.

## 2021-09-16 NOTE — TELEPHONE ENCOUNTER
Caller: Andrae Garza Jr.    Relationship to patient: Self    Best call back number: [phone]    Patient is needing: PATIENT STATED BLOOD PRESSURE BEEN RUNNING 122/85 AT 8 AM AND 98/69 IN AFTERNOON     PATIENT STATED THAT EKG SENT OVER TO US BY 2 PM TODAY AS WELL    PATIENT ASKED IF HE NEEDED TO CARDIOLOGIST     PLEASE ADVISE ASAP

## 2021-09-16 NOTE — TELEPHONE ENCOUNTER
Called lvm to call back     HUB PLEASE ASK WHAT HIS BLOOD PRESSURE HAS BEEN RUNNING. NEED NUMBERS TO SEND TO DOCTOR.

## 2021-09-16 NOTE — TELEPHONE ENCOUNTER
Please call, the EEG was normal. No seizures.     Sound like BP may be dropping.     Have him hold the water pill ( HCTZ ) for now and continue to monitor BP and I will place referral to cardiologist.

## 2021-09-16 NOTE — TELEPHONE ENCOUNTER
Caller: Andrae Garza Jr.    Relationship: Self    Best call back number: [phone]    What is the best time to reach you: ANY TIME     Who are you requesting to speak with (clinical staff, provider,  specific staff member): DR. BENTLEY     Do you know the name of the person who called: N/A    What was the call regarding: PATIENTS BLOOD PRESSURE HS BEEN LOW AND HE HAS BEEN TAKING 1.5 TABLETS. PLEASE ADVISE AND CALL PATIENT [phone]     Do you require a callback: YES

## 2021-09-16 NOTE — TELEPHONE ENCOUNTER
Left detailed vm for pt notifying him we have not received results from [facility] according to Medical Records, the Dr hasn't signed off yet.

## 2021-09-16 NOTE — TELEPHONE ENCOUNTER
Called informed pt. Stated he has seen a Dr. Abebe at the hospital. Said he will call them and see if he can get in there.

## 2021-10-01 ENCOUNTER — OFFICE VISIT (OUTPATIENT)
Dept: FAMILY MEDICINE CLINIC | Facility: CLINIC | Age: 66
End: 2021-10-01

## 2021-10-01 VITALS
BODY MASS INDEX: 30.42 KG/M2 | TEMPERATURE: 98.7 F | SYSTOLIC BLOOD PRESSURE: 136 MMHG | HEIGHT: 74 IN | DIASTOLIC BLOOD PRESSURE: 88 MMHG | WEIGHT: 237 LBS | RESPIRATION RATE: 18 BRPM | HEART RATE: 66 BPM

## 2021-10-01 DIAGNOSIS — Z72.0 TOBACCO USE: ICD-10-CM

## 2021-10-01 DIAGNOSIS — I10 ESSENTIAL HYPERTENSION: Primary | ICD-10-CM

## 2021-10-01 DIAGNOSIS — Z12.2 ENCOUNTER FOR SCREENING FOR LUNG CANCER: ICD-10-CM

## 2021-10-01 DIAGNOSIS — F17.210 NICOTINE DEPENDENCE, CIGARETTES, UNCOMPLICATED: ICD-10-CM

## 2021-10-01 DIAGNOSIS — Z23 NEED FOR INFLUENZA VACCINATION: ICD-10-CM

## 2021-10-01 DIAGNOSIS — E78.2 MIXED HYPERLIPIDEMIA: ICD-10-CM

## 2021-10-01 PROBLEM — C43.9 MELANOMA (HCC): Status: ACTIVE | Noted: 2021-10-01

## 2021-10-01 PROCEDURE — 99214 OFFICE O/P EST MOD 30 MIN: CPT | Performed by: FAMILY MEDICINE

## 2021-10-01 PROCEDURE — G0008 ADMIN INFLUENZA VIRUS VAC: HCPCS | Performed by: FAMILY MEDICINE

## 2021-10-01 PROCEDURE — 90662 IIV NO PRSV INCREASED AG IM: CPT | Performed by: FAMILY MEDICINE

## 2021-10-01 RX ORDER — FEXOFENADINE HYDROCHLORIDE 60 MG/1
180 TABLET, FILM COATED ORAL DAILY
COMMUNITY
End: 2022-10-27

## 2021-10-01 RX ORDER — FLUTICASONE PROPIONATE 50 MCG
2 SPRAY, SUSPENSION (ML) NASAL DAILY
COMMUNITY
End: 2022-05-26

## 2021-10-01 RX ORDER — BUPROPION HYDROCHLORIDE 150 MG/1
TABLET, EXTENDED RELEASE ORAL
Qty: 60 TABLET | Refills: 5 | Status: SHIPPED | OUTPATIENT
Start: 2021-10-01 | End: 2023-02-23

## 2021-10-01 NOTE — PROGRESS NOTES
"Chief Complaint  Hypertension (6 month f/u )    Subjective          Andrae Garza Jr. presents to [facility]  Hypertension  This is a chronic problem. The current episode started more than 1 year ago. The problem has been waxing and waning since onset. The problem is uncontrolled (labile BP since back on Nadolol. had been good on 1/2 dose and then increased back to 1 dose and up agai.n. HCTZ, caused increased urination). Pertinent negatives include no headaches, peripheral edema or shortness of breath. There are no associated agents to hypertension. Current antihypertension treatment includes beta blockers. The current treatment provides mild improvement. There are no compliance problems.  There is no history of angina, kidney disease or CAD/MI. There is no history of chronic renal disease.     Saw Derm and card    Had melanoma left arm and had wide excision and was negative for spread.     Smokes 1 ppd and 2 alcohol drinks for night    Copd kevin  Has a upper throat congestion   Head congestion and drains down the throat  Some cough all night  Thick drainage  Smoked for since age 24         Patient is here for follow-up of hypertension, hyperlipidemia.  Had recent syncopal episode 1 month ago.  Had work-up including EEG which was normal.  CT scan of the head was normal.  Blood work including CBC, CMP and magnesium are all normal.    Review of Systems   Constitutional: Negative.    HENT: Positive for congestion.    Respiratory: Negative.  Negative for shortness of breath.    Cardiovascular: Negative.    Gastrointestinal: Negative.    Neurological: Negative.    Psychiatric/Behavioral: Negative.         Objective       Vital Signs:   /88   Pulse 66   Temp 98.7 °F (37.1 °C)   Resp 18   Ht 188 cm (74\")   Wt 108 kg (237 lb)   BMI 30.43 kg/m²     Physical Exam  Vitals and nursing note reviewed.   Constitutional:       General: He is not in acute distress.     Appearance: " Normal appearance. He is well-developed. He is not ill-appearing.   HENT:      Head: Normocephalic and atraumatic.      Right Ear: Hearing, tympanic membrane, ear canal and external ear normal.      Left Ear: Hearing, tympanic membrane, ear canal and external ear normal.      Nose: Nose normal. No congestion or rhinorrhea.      Mouth/Throat:      Mouth: Mucous membranes are moist.      Pharynx: No oropharyngeal exudate or posterior oropharyngeal erythema.   Eyes:      General: Lids are normal.      Conjunctiva/sclera: Conjunctivae normal.      Pupils: Pupils are equal, round, and reactive to light.   Neck:      Thyroid: No thyromegaly.   Cardiovascular:      Rate and Rhythm: Normal rate and regular rhythm.      Heart sounds: Normal heart sounds. No murmur heard.   No friction rub.   Pulmonary:      Effort: Pulmonary effort is normal. No respiratory distress.      Breath sounds: Normal breath sounds. No wheezing or rales.   Abdominal:      General: Bowel sounds are normal. There is no distension.      Palpations: Abdomen is soft. There is no mass.      Tenderness: There is no abdominal tenderness. There is no guarding or rebound.   Musculoskeletal:      Cervical back: Normal range of motion and neck supple.      Right lower leg: Edema ( Trace) present.      Left lower leg: Edema ( Trace) present.   Skin:     General: Skin is warm and dry.   Neurological:      General: No focal deficit present.      Mental Status: He is alert.   Psychiatric:         Mood and Affect: Mood normal.         Speech: Speech normal.         Behavior: Behavior normal.          Result Review :     CMP    CMP 4/1/21 8/30/21   Glucose 92 92   BUN 16 18   Creatinine 1.00 1.27   eGFR Non  Am 75 58 (A)   eGFR African Am 91 67   Sodium 139 135   Potassium 3.7 3.6   Chloride 96 (A) 97   Calcium 9.1 9.0   Total Protein 6.2 6.0   Albumin 4.20 4.1   Globulin 2.0 1.9   Total Bilirubin 1.4 (A) 0.5   Alkaline Phosphatase 80 78   AST (SGOT) 27 26    ALT (SGPT) 27 23   (A) Abnormal value       Comments are available for some flowsheets but are not being displayed.           Data reviewed: EEG was read as normal.  Reviewed EKG tracing.              Assessment and Plan    Diagnoses and all orders for this visit:    1. Essential hypertension (Primary)    2. Mixed hyperlipidemia    3. Tobacco use  -     buPROPion SR (Wellbutrin SR) 150 MG 12 hr tablet; One po daily for 1 week then one po BID  Dispense: 60 tablet; Refill: 5  -     CT Chest Low Dose Wo; Future    4. Encounter for screening for lung cancer  -     CT Chest Low Dose Wo; Future    5. Nicotine dependence, cigarettes, uncomplicated   -     CT Chest Low Dose Wo; Future    6. Need for influenza vaccination  -     Fluzone High-Dose 65+yrs          DISCUSSION    HTN - BP has been increased. Add back 1/2 HCTZ 25 mg daily. He has at home.  Check blood pressure and call with readings in 1 to 2 weeks.  He agrees.  He does have some mild swelling of the legs hopefully this will help that.    Reviewed CMP and magnesium level done recently.    Continue efforts to quit/ cut back on tobacco.  He would like to try Wellbutrin in the future.  Prescription given.  Side effects explained.    Due for low-dose CT chest screening.    Flu shot today.          Follow Up   Return in about 6 months (around 4/1/2022).    Patient was given instructions and counseling regarding his condition or for health maintenance advice. Please see specific information pulled into the AVS if appropriate.       Brent Botello MD

## 2021-10-21 ENCOUNTER — HOSPITAL ENCOUNTER (OUTPATIENT)
Dept: CT IMAGING | Facility: HOSPITAL | Age: 66
Discharge: HOME | End: 2021-10-21
Admitting: FAMILY MEDICINE

## 2021-10-21 DIAGNOSIS — Z12.2 ENCOUNTER FOR SCREENING FOR LUNG CANCER: ICD-10-CM

## 2021-10-21 DIAGNOSIS — Z72.0 TOBACCO USE: ICD-10-CM

## 2021-10-21 DIAGNOSIS — F17.210 NICOTINE DEPENDENCE, CIGARETTES, UNCOMPLICATED: ICD-10-CM

## 2021-10-21 PROCEDURE — 71271 CT THORAX LUNG CANCER SCR C-: CPT

## 2021-10-25 DIAGNOSIS — R93.3 ABNORMAL CT SCAN, ESOPHAGUS: Primary | ICD-10-CM

## 2021-11-08 DIAGNOSIS — I10 ESSENTIAL HYPERTENSION: ICD-10-CM

## 2021-11-08 RX ORDER — NADOLOL 40 MG/1
40 TABLET ORAL DAILY
Qty: 90 TABLET | Refills: 1 | Status: ON HOLD | OUTPATIENT
Start: 2021-11-08 | End: 2022-08-03

## 2021-11-08 RX ORDER — HYDROCHLOROTHIAZIDE 25 MG/1
25 TABLET ORAL DAILY
Qty: 90 TABLET | Refills: 1 | Status: SHIPPED | OUTPATIENT
Start: 2021-11-08 | End: 2022-03-24

## 2021-11-08 NOTE — TELEPHONE ENCOUNTER
Caller: Andrae Garza Jr.    Relationship: Self      Medication requested (name and dosage):    Requested Prescriptions:   Requested Prescriptions     Pending Prescriptions Disp Refills   • nadolol (CORGARD) 40 MG tablet 90 tablet 1     Sig: Take 1 tablet by mouth Daily.   • hydroCHLOROthiazide (HYDRODIURIL) 25 MG tablet 90 tablet 1     Sig: Take 1 tablet by mouth Daily.        Pharmacy where request should be sent: [facility] [address]    Additional details provided by patient:PATIENT IS REQUESTING A NEW 90 DAY PRESCRIPTIONS WITH REFILLS ON ABOVE MEDICATIONS.  PATIENT HAD RECENT APPOINTMENT AND HAD FORGOTTEN TO REQUEST.    Best call back number: [phone]    Does the patient have less than a 3 day supply:  [x] Yes  [] No    Iza Cedeno   11/08/21 09:15 EST

## 2022-02-15 ENCOUNTER — APPOINTMENT (OUTPATIENT)
Dept: CARDIOLOGY | Facility: HOSPITAL | Age: 67
End: 2022-02-15

## 2022-02-15 ENCOUNTER — HOSPITAL ENCOUNTER (INPATIENT)
Facility: HOSPITAL | Age: 67
LOS: 2 days | Discharge: HOME | End: 2022-02-17
Attending: EMERGENCY MEDICINE | Admitting: INTERNAL MEDICINE

## 2022-02-15 ENCOUNTER — APPOINTMENT (OUTPATIENT)
Dept: CT IMAGING | Facility: HOSPITAL | Age: 67
End: 2022-02-15

## 2022-02-15 ENCOUNTER — APPOINTMENT (OUTPATIENT)
Dept: GENERAL RADIOLOGY | Facility: HOSPITAL | Age: 67
End: 2022-02-15

## 2022-02-15 DIAGNOSIS — Z95.2: ICD-10-CM

## 2022-02-15 DIAGNOSIS — R55 SYNCOPE AND COLLAPSE: Primary | ICD-10-CM

## 2022-02-15 LAB
ALBUMIN SERPL-MCNC: 4 G/DL (ref 3.5–5.2)
ALBUMIN/GLOB SERPL: 1.7 G/DL
ALP SERPL-CCNC: 74 U/L (ref 39–117)
ALT SERPL W P-5'-P-CCNC: 22 U/L (ref 1–41)
ANION GAP SERPL CALCULATED.3IONS-SCNC: 10 MMOL/L (ref 5–15)
ASCENDING AORTA: 4 CM
AST SERPL-CCNC: 25 U/L (ref 1–40)
BACTERIA UR QL AUTO: ABNORMAL /HPF
BASOPHILS # BLD AUTO: 0.06 10*3/MM3 (ref 0–0.2)
BASOPHILS NFR BLD AUTO: 0.8 % (ref 0–1.5)
BH CV ECHO MEAS - AO MAX PG (FULL): 8.6 MMHG
BH CV ECHO MEAS - AO MAX PG: 11 MMHG
BH CV ECHO MEAS - AO MEAN PG (FULL): 4.5 MMHG
BH CV ECHO MEAS - AO MEAN PG: 6.5 MMHG
BH CV ECHO MEAS - AO ROOT AREA (BSA CORRECTED): 2
BH CV ECHO MEAS - AO ROOT AREA: 16.6 CM^2
BH CV ECHO MEAS - AO ROOT DIAM: 4.6 CM
BH CV ECHO MEAS - AO V2 MAX: 163.5 CM/SEC
BH CV ECHO MEAS - AO V2 MEAN: 121 CM/SEC
BH CV ECHO MEAS - AO V2 VTI: 30.3 CM
BH CV ECHO MEAS - AVA(I,A): 2.1 CM^2
BH CV ECHO MEAS - AVA(I,D): 2.1 CM^2
BH CV ECHO MEAS - AVA(V,A): 1.8 CM^2
BH CV ECHO MEAS - AVA(V,D): 1.8 CM^2
BH CV ECHO MEAS - BSA(HAYCOCK): 2.3 M^2
BH CV ECHO MEAS - BSA(HAYCOCK): 2.3 M^2
BH CV ECHO MEAS - BSA: 2.3 M^2
BH CV ECHO MEAS - BSA: 2.3 M^2
BH CV ECHO MEAS - BZI_BMI: 30.2 KILOGRAMS/M^2
BH CV ECHO MEAS - BZI_BMI: 30.3 KILOGRAMS/M^2
BH CV ECHO MEAS - BZI_METRIC_HEIGHT: 185.4 CM
BH CV ECHO MEAS - BZI_METRIC_HEIGHT: 185.4 CM
BH CV ECHO MEAS - BZI_METRIC_WEIGHT: 103.9 KG
BH CV ECHO MEAS - BZI_METRIC_WEIGHT: 104.3 KG
BH CV ECHO MEAS - EDV(CUBED): 175.6 ML
BH CV ECHO MEAS - EDV(MOD-SP2): 132 ML
BH CV ECHO MEAS - EDV(MOD-SP4): 103 ML
BH CV ECHO MEAS - EDV(TEICH): 153.7 ML
BH CV ECHO MEAS - EF(CUBED): 68.8 %
BH CV ECHO MEAS - EF(MOD-BP): 46.1 %
BH CV ECHO MEAS - EF(MOD-SP2): 45.1 %
BH CV ECHO MEAS - EF(MOD-SP4): 51.5 %
BH CV ECHO MEAS - EF(TEICH): 59.7 %
BH CV ECHO MEAS - ESV(CUBED): 54.9 ML
BH CV ECHO MEAS - ESV(MOD-SP2): 72.5 ML
BH CV ECHO MEAS - ESV(MOD-SP4): 50 ML
BH CV ECHO MEAS - ESV(TEICH): 62 ML
BH CV ECHO MEAS - FS: 32.1 %
BH CV ECHO MEAS - IVS/LVPW: 0.91
BH CV ECHO MEAS - IVSD: 1 CM
BH CV ECHO MEAS - LA DIMENSION: 3.5 CM
BH CV ECHO MEAS - LA/AO: 0.76
BH CV ECHO MEAS - LAD MAJOR: 5.1 CM
BH CV ECHO MEAS - LAT PEAK E' VEL: 6.2 CM/SEC
BH CV ECHO MEAS - LATERAL E/E' RATIO: 9.1
BH CV ECHO MEAS - LV DIASTOLIC VOL/BSA (35-75): 45.1 ML/M^2
BH CV ECHO MEAS - LV MASS(C)D: 234.3 GRAMS
BH CV ECHO MEAS - LV MASS(C)DI: 102.6 GRAMS/M^2
BH CV ECHO MEAS - LV MAX PG: 2.4 MMHG
BH CV ECHO MEAS - LV MEAN PG: 2 MMHG
BH CV ECHO MEAS - LV SYSTOLIC VOL/BSA (12-30): 21.9 ML/M^2
BH CV ECHO MEAS - LV V1 MAX: 77 CM/SEC
BH CV ECHO MEAS - LV V1 MEAN: 59.3 CM/SEC
BH CV ECHO MEAS - LV V1 VTI: 16.5 CM
BH CV ECHO MEAS - LVIDD: 5.6 CM
BH CV ECHO MEAS - LVIDS: 3.8 CM
BH CV ECHO MEAS - LVLD AP2: 8.6 CM
BH CV ECHO MEAS - LVLD AP4: 8.6 CM
BH CV ECHO MEAS - LVLS AP2: 7.1 CM
BH CV ECHO MEAS - LVLS AP4: 6.7 CM
BH CV ECHO MEAS - LVOT AREA (M): 3.8 CM^2
BH CV ECHO MEAS - LVOT AREA: 3.8 CM^2
BH CV ECHO MEAS - LVOT DIAM: 2.2 CM
BH CV ECHO MEAS - LVPWD: 1.1 CM
BH CV ECHO MEAS - MED PEAK E' VEL: 5.2 CM/SEC
BH CV ECHO MEAS - MEDIAL E/E' RATIO: 10.8
BH CV ECHO MEAS - MV A MAX VEL: 93.8 CM/SEC
BH CV ECHO MEAS - MV DEC SLOPE: 169 CM/SEC^2
BH CV ECHO MEAS - MV DEC TIME: 0.3 SEC
BH CV ECHO MEAS - MV E MAX VEL: 56.6 CM/SEC
BH CV ECHO MEAS - MV E/A: 0.6
BH CV ECHO MEAS - MV MAX PG: 4 MMHG
BH CV ECHO MEAS - MV MEAN PG: 2 MMHG
BH CV ECHO MEAS - MV P1/2T MAX VEL: 70.6 CM/SEC
BH CV ECHO MEAS - MV P1/2T: 122.4 MSEC
BH CV ECHO MEAS - MV V2 MAX: 100 CM/SEC
BH CV ECHO MEAS - MV V2 MEAN: 63.9 CM/SEC
BH CV ECHO MEAS - MV V2 VTI: 32.8 CM
BH CV ECHO MEAS - MVA P1/2T LCG: 3.1 CM^2
BH CV ECHO MEAS - MVA(P1/2T): 1.8 CM^2
BH CV ECHO MEAS - MVA(VTI): 1.9 CM^2
BH CV ECHO MEAS - PA ACC TIME: 0.08 SEC
BH CV ECHO MEAS - PA PR(ACCEL): 42.6 MMHG
BH CV ECHO MEAS - RAP SYSTOLE: 3 MMHG
BH CV ECHO MEAS - RVSP: 18 MMHG
BH CV ECHO MEAS - SI(AO): 220.5 ML/M^2
BH CV ECHO MEAS - SI(CUBED): 52.9 ML/M^2
BH CV ECHO MEAS - SI(LVOT): 27.5 ML/M^2
BH CV ECHO MEAS - SI(MOD-SP2): 26.1 ML/M^2
BH CV ECHO MEAS - SI(MOD-SP4): 23.2 ML/M^2
BH CV ECHO MEAS - SI(TEICH): 40.2 ML/M^2
BH CV ECHO MEAS - SV(AO): 503.6 ML
BH CV ECHO MEAS - SV(CUBED): 120.7 ML
BH CV ECHO MEAS - SV(LVOT): 62.7 ML
BH CV ECHO MEAS - SV(MOD-SP2): 59.5 ML
BH CV ECHO MEAS - SV(MOD-SP4): 53 ML
BH CV ECHO MEAS - SV(TEICH): 91.7 ML
BH CV ECHO MEAS - TAPSE (>1.6): 1.1 CM
BH CV ECHO MEAS - TR MAX PG: 15 MMHG
BH CV ECHO MEAS - TR MAX VEL: 193.3 CM/SEC
BH CV ECHO MEASUREMENTS AVERAGE E/E' RATIO: 9.93
BH CV VAS BP RIGHT ARM: NORMAL MMHG
BH CV XLRA - RV BASE: 4.3 CM
BH CV XLRA - RV MID: 2.9 CM
BH CV XLRA - TDI S': 6.5 CM/SEC
BH CV XLRA MEAS LEFT DIST CCA EDV: 18.9 CM/SEC
BH CV XLRA MEAS LEFT DIST CCA PSV: 46.8 CM/SEC
BH CV XLRA MEAS LEFT DIST ICA EDV: 32.1 CM/SEC
BH CV XLRA MEAS LEFT DIST ICA PSV: 83.6 CM/SEC
BH CV XLRA MEAS LEFT ICA/CCA RATIO: 1.5
BH CV XLRA MEAS LEFT MID CCA EDV: 16.2 CM/SEC
BH CV XLRA MEAS LEFT MID CCA PSV: 48.1 CM/SEC
BH CV XLRA MEAS LEFT MID ICA EDV: 34.9 CM/SEC
BH CV XLRA MEAS LEFT MID ICA PSV: 71.2 CM/SEC
BH CV XLRA MEAS LEFT PROX CCA EDV: 16.7 CM/SEC
BH CV XLRA MEAS LEFT PROX CCA PSV: 66.8 CM/SEC
BH CV XLRA MEAS LEFT PROX ECA EDV: 15.3 CM/SEC
BH CV XLRA MEAS LEFT PROX ECA PSV: 48.7 CM/SEC
BH CV XLRA MEAS LEFT PROX ICA EDV: 14.7 CM/SEC
BH CV XLRA MEAS LEFT PROX ICA PSV: 34.6 CM/SEC
BH CV XLRA MEAS LEFT PROX SCLA PSV: 108.2 CM/SEC
BH CV XLRA MEAS LEFT VERTEBRAL A EDV: 14.9 CM/SEC
BH CV XLRA MEAS LEFT VERTEBRAL A PSV: 38.1 CM/SEC
BH CV XLRA MEAS RIGHT DIST CCA EDV: 16.2 CM/SEC
BH CV XLRA MEAS RIGHT DIST CCA PSV: 47.1 CM/SEC
BH CV XLRA MEAS RIGHT DIST ICA EDV: 27.5 CM/SEC
BH CV XLRA MEAS RIGHT DIST ICA PSV: 69 CM/SEC
BH CV XLRA MEAS RIGHT ICA/CCA RATIO: 1.3
BH CV XLRA MEAS RIGHT MID CCA EDV: 14.4 CM/SEC
BH CV XLRA MEAS RIGHT MID CCA PSV: 54.1 CM/SEC
BH CV XLRA MEAS RIGHT MID ICA EDV: 26.6 CM/SEC
BH CV XLRA MEAS RIGHT MID ICA PSV: 71.6 CM/SEC
BH CV XLRA MEAS RIGHT PROX CCA EDV: 18.3 CM/SEC
BH CV XLRA MEAS RIGHT PROX CCA PSV: 58.1 CM/SEC
BH CV XLRA MEAS RIGHT PROX ECA EDV: 10.5 CM/SEC
BH CV XLRA MEAS RIGHT PROX ECA PSV: 62.4 CM/SEC
BH CV XLRA MEAS RIGHT PROX ICA EDV: 16.3 CM/SEC
BH CV XLRA MEAS RIGHT PROX ICA PSV: 45.6 CM/SEC
BH CV XLRA MEAS RIGHT PROX SCLA PSV: 89.4 CM/SEC
BH CV XLRA MEAS RIGHT VERTEBRAL A EDV: 15.3 CM/SEC
BH CV XLRA MEAS RIGHT VERTEBRAL A PSV: 40.6 CM/SEC
BILIRUB SERPL-MCNC: 0.6 MG/DL (ref 0–1.2)
BILIRUB UR QL STRIP: NEGATIVE
BUN SERPL-MCNC: 15 MG/DL (ref 8–23)
BUN/CREAT SERPL: 11.9 (ref 7–25)
CALCIUM SPEC-SCNC: 9.3 MG/DL (ref 8.6–10.5)
CHLORIDE SERPL-SCNC: 96 MMOL/L (ref 98–107)
CLARITY UR: CLEAR
CO2 SERPL-SCNC: 28 MMOL/L (ref 22–29)
COLOR UR: YELLOW
CREAT SERPL-MCNC: 1.26 MG/DL (ref 0.76–1.27)
D DIMER PPP FEU-MCNC: 0.77 MCGFEU/ML (ref 0–0.56)
DEPRECATED RDW RBC AUTO: 39.7 FL (ref 37–54)
EOSINOPHIL # BLD AUTO: 0.24 10*3/MM3 (ref 0–0.4)
EOSINOPHIL NFR BLD AUTO: 3.3 % (ref 0.3–6.2)
ERYTHROCYTE [DISTWIDTH] IN BLOOD BY AUTOMATED COUNT: 12.1 % (ref 12.3–15.4)
GFR SERPL CREATININE-BSD FRML MDRD: 57 ML/MIN/1.73
GLOBULIN UR ELPH-MCNC: 2.3 GM/DL
GLUCOSE SERPL-MCNC: 117 MG/DL (ref 65–99)
GLUCOSE UR STRIP-MCNC: NEGATIVE MG/DL
HCT VFR BLD AUTO: 45.7 % (ref 37.5–51)
HGB BLD-MCNC: 15.8 G/DL (ref 13–17.7)
HGB UR QL STRIP.AUTO: NEGATIVE
HOLD SPECIMEN: NORMAL
HYALINE CASTS UR QL AUTO: ABNORMAL /LPF
IMM GRANULOCYTES # BLD AUTO: 0.03 10*3/MM3 (ref 0–0.05)
IMM GRANULOCYTES NFR BLD AUTO: 0.4 % (ref 0–0.5)
KETONES UR QL STRIP: NEGATIVE
LEFT ATRIUM VOLUME INDEX: 22.5 ML/M^2
LEFT ATRIUM VOLUME: 51.4 ML
LEUKOCYTE ESTERASE UR QL STRIP.AUTO: NEGATIVE
LV EF 2D ECHO EST: 50 %
LYMPHOCYTES # BLD AUTO: 1.1 10*3/MM3 (ref 0.7–3.1)
LYMPHOCYTES NFR BLD AUTO: 15.2 % (ref 19.6–45.3)
MAGNESIUM SERPL-MCNC: 1.6 MG/DL (ref 1.6–2.4)
MAXIMAL PREDICTED HEART RATE: 153 BPM
MAXIMAL PREDICTED HEART RATE: 153 BPM
MCH RBC QN AUTO: 30.8 PG (ref 26.6–33)
MCHC RBC AUTO-ENTMCNC: 34.6 G/DL (ref 31.5–35.7)
MCV RBC AUTO: 89.1 FL (ref 79–97)
MONOCYTES # BLD AUTO: 0.55 10*3/MM3 (ref 0.1–0.9)
MONOCYTES NFR BLD AUTO: 7.6 % (ref 5–12)
NEUTROPHILS NFR BLD AUTO: 5.26 10*3/MM3 (ref 1.7–7)
NEUTROPHILS NFR BLD AUTO: 72.7 % (ref 42.7–76)
NITRITE UR QL STRIP: NEGATIVE
NRBC BLD AUTO-RTO: 0 /100 WBC (ref 0–0.2)
NT-PROBNP SERPL-MCNC: 117.6 PG/ML (ref 0–900)
PH UR STRIP.AUTO: 5.5 [PH] (ref 5–8)
PLATELET # BLD AUTO: 204 10*3/MM3 (ref 140–450)
PMV BLD AUTO: 9 FL (ref 6–12)
POTASSIUM SERPL-SCNC: 4 MMOL/L (ref 3.5–5.2)
PROT SERPL-MCNC: 6.3 G/DL (ref 6–8.5)
PROT UR QL STRIP: ABNORMAL
QT INTERVAL: 470 MS
QTC INTERVAL: 496 MS
RBC # BLD AUTO: 5.13 10*6/MM3 (ref 4.14–5.8)
RBC # UR STRIP: ABNORMAL /HPF
REF LAB TEST METHOD: ABNORMAL
RIGHT ARM BP: NORMAL MMHG
SODIUM SERPL-SCNC: 134 MMOL/L (ref 136–145)
SP GR UR STRIP: 1.02 (ref 1–1.03)
SQUAMOUS #/AREA URNS HPF: ABNORMAL /HPF
STRESS TARGET HR: 130 BPM
STRESS TARGET HR: 130 BPM
TROPONIN T SERPL-MCNC: <0.01 NG/ML (ref 0–0.03)
UROBILINOGEN UR QL STRIP: ABNORMAL
WBC # UR STRIP: ABNORMAL /HPF
WBC NRBC COR # BLD: 7.24 10*3/MM3 (ref 3.4–10.8)
WHOLE BLOOD HOLD SPECIMEN: NORMAL
WHOLE BLOOD HOLD SPECIMEN: NORMAL

## 2022-02-15 PROCEDURE — 85379 FIBRIN DEGRADATION QUANT: CPT | Performed by: INTERNAL MEDICINE

## 2022-02-15 PROCEDURE — 93880 EXTRACRANIAL BILAT STUDY: CPT | Performed by: INTERNAL MEDICINE

## 2022-02-15 PROCEDURE — 25010000002 HEPARIN (PORCINE) PER 1000 UNITS: Performed by: NURSE PRACTITIONER

## 2022-02-15 PROCEDURE — 93005 ELECTROCARDIOGRAM TRACING: CPT | Performed by: EMERGENCY MEDICINE

## 2022-02-15 PROCEDURE — 99284 EMERGENCY DEPT VISIT MOD MDM: CPT

## 2022-02-15 PROCEDURE — 84484 ASSAY OF TROPONIN QUANT: CPT | Performed by: EMERGENCY MEDICINE

## 2022-02-15 PROCEDURE — 81001 URINALYSIS AUTO W/SCOPE: CPT | Performed by: EMERGENCY MEDICINE

## 2022-02-15 PROCEDURE — 80053 COMPREHEN METABOLIC PANEL: CPT | Performed by: EMERGENCY MEDICINE

## 2022-02-15 PROCEDURE — 99285 EMERGENCY DEPT VISIT HI MDM: CPT

## 2022-02-15 PROCEDURE — 93306 TTE W/DOPPLER COMPLETE: CPT | Performed by: INTERNAL MEDICINE

## 2022-02-15 PROCEDURE — 70450 CT HEAD/BRAIN W/O DYE: CPT

## 2022-02-15 PROCEDURE — 93306 TTE W/DOPPLER COMPLETE: CPT

## 2022-02-15 PROCEDURE — 93880 EXTRACRANIAL BILAT STUDY: CPT

## 2022-02-15 PROCEDURE — 85025 COMPLETE CBC W/AUTO DIFF WBC: CPT | Performed by: EMERGENCY MEDICINE

## 2022-02-15 PROCEDURE — 83735 ASSAY OF MAGNESIUM: CPT | Performed by: NURSE PRACTITIONER

## 2022-02-15 PROCEDURE — 83880 ASSAY OF NATRIURETIC PEPTIDE: CPT | Performed by: EMERGENCY MEDICINE

## 2022-02-15 PROCEDURE — 71045 X-RAY EXAM CHEST 1 VIEW: CPT

## 2022-02-15 PROCEDURE — 99223 1ST HOSP IP/OBS HIGH 75: CPT | Performed by: INTERNAL MEDICINE

## 2022-02-15 RX ORDER — CETIRIZINE HYDROCHLORIDE 10 MG/1
10 TABLET ORAL DAILY
Status: DISCONTINUED | OUTPATIENT
Start: 2022-02-16 | End: 2022-02-17 | Stop reason: HOSPADM

## 2022-02-15 RX ORDER — FLUTICASONE PROPIONATE 50 MCG
2 SPRAY, SUSPENSION (ML) NASAL DAILY
Status: DISCONTINUED | OUTPATIENT
Start: 2022-02-15 | End: 2022-02-17 | Stop reason: HOSPADM

## 2022-02-15 RX ORDER — HEPARIN SODIUM 5000 [USP'U]/ML
5000 INJECTION, SOLUTION INTRAVENOUS; SUBCUTANEOUS EVERY 12 HOURS SCHEDULED
Status: DISCONTINUED | OUTPATIENT
Start: 2022-02-15 | End: 2022-02-17 | Stop reason: HOSPADM

## 2022-02-15 RX ORDER — SODIUM CHLORIDE 0.9 % (FLUSH) 0.9 %
10 SYRINGE (ML) INJECTION AS NEEDED
Status: DISCONTINUED | OUTPATIENT
Start: 2022-02-15 | End: 2022-02-17 | Stop reason: HOSPADM

## 2022-02-15 RX ORDER — NICOTINE 21 MG/24HR
1 PATCH, TRANSDERMAL 24 HOURS TRANSDERMAL
Status: DISCONTINUED | OUTPATIENT
Start: 2022-02-15 | End: 2022-02-17 | Stop reason: HOSPADM

## 2022-02-15 RX ORDER — CHOLECALCIFEROL (VITAMIN D3) 125 MCG
5 CAPSULE ORAL NIGHTLY PRN
Status: DISCONTINUED | OUTPATIENT
Start: 2022-02-15 | End: 2022-02-17 | Stop reason: HOSPADM

## 2022-02-15 RX ORDER — HYDROCHLOROTHIAZIDE 25 MG/1
25 TABLET ORAL DAILY
Status: DISCONTINUED | OUTPATIENT
Start: 2022-02-16 | End: 2022-02-17 | Stop reason: HOSPADM

## 2022-02-15 RX ORDER — TAMSULOSIN HYDROCHLORIDE 0.4 MG/1
0.4 CAPSULE ORAL NIGHTLY
Status: DISCONTINUED | OUTPATIENT
Start: 2022-02-15 | End: 2022-02-17 | Stop reason: HOSPADM

## 2022-02-15 RX ORDER — NADOLOL 20 MG/1
40 TABLET ORAL DAILY
Status: DISCONTINUED | OUTPATIENT
Start: 2022-02-16 | End: 2022-02-17 | Stop reason: HOSPADM

## 2022-02-15 RX ADMIN — Medication 1 PATCH: at 16:15

## 2022-02-15 RX ADMIN — FLUTICASONE PROPIONATE 2 SPRAY: 50 SPRAY, METERED NASAL at 16:15

## 2022-02-15 RX ADMIN — HEPARIN SODIUM 5000 UNITS: 5000 INJECTION, SOLUTION INTRAVENOUS; SUBCUTANEOUS at 21:38

## 2022-02-15 RX ADMIN — TAMSULOSIN HYDROCHLORIDE 0.4 MG: 0.4 CAPSULE ORAL at 21:38

## 2022-02-15 RX ADMIN — Medication 5 MG: at 21:38

## 2022-02-15 NOTE — ED PROVIDER NOTES
"Subjective   This patient is a 66-year-old gentleman brought in via [facility].  According to the report we received by [facility], the patient was a \"post code.\"  Evidently, the patient was found unresponsive, pulseless and apneic and the wife started CPR.  Upon EMS arrival, the patient was a GCS of 12 or 13 and confused, combative and diaphoretic.  Upon arrival here he was a GCS of 15, somewhat angry and verbally aggressive but not physically aggressive.  Patient tells me that he remembers going outside to smoke and getting some wood for the fireplace.  The next thing he knew \"I woke up in the ambulance.\"  Patient has no history of seizures.  Denies that anything hurts.  Tells me he has no chest pain, shortness of breath or palpitations.  Denies any history of Brugada syndrome, sudden cardiac death, palpitations or syncope.  He tells me he has a longstanding history of \"heart problems.\"  He tells me he does not know if he has a cardiologist or not but thinks that he saw a cardiologist at [facility] approximate 10 or 15 years ago.  He tells me he currently does not see a cardiologist but sees Dr. Brent Botello in [place].  He tells me his cardiologist at [facility] now is at [facility] but he does not see him.  He does not have any physician care at [facility] but wife requested evaluation here today.  Patient denies any bowel or bladder continence issues.  Reports that he has had a mild cough.  In summary, we have a 66-year-old gentleman brought in after wife found him unresponsive and started CPR.  He has been a GCS of 15 since he got here and has no complaints including but not limited to headache, fevers, chills, palpitations, shortness of breath, muscle pain, headache, neck pain, or other concerns.    Past medical history  Renal cell carcinoma, aortic valve replacement, CAD per patient, hypertension, hyperlipidemia.    Family history  CAD in dad per patient.          Review " of Systems   Constitutional: Negative.  Negative for chills, fatigue, fever and unexpected weight change.   HENT: Negative for dental problem, ear pain, hearing loss and sinus pressure.    Eyes: Negative for pain and visual disturbance.   Respiratory: Negative for chest tightness and shortness of breath.    Cardiovascular: Negative for chest pain, palpitations and leg swelling.   Gastrointestinal: Negative for blood in stool, diarrhea, nausea and vomiting.   Genitourinary: Negative for difficulty urinating, dysuria, frequency, hematuria and urgency.   Musculoskeletal: Negative for myalgias, neck pain and neck stiffness.   Neurological: Negative for seizures, syncope, speech difficulty, light-headedness and headaches.   Psychiatric/Behavioral: Positive for confusion.   All other systems reviewed and are negative.      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   • TISSUE AORTIC VALVE REPLACEMENT  01/25/2008    Pig Valve   • TOOTH EXTRACTION  12/02/2019       Family History   Family history unknown: Yes       Social History     Socioeconomic History   • Marital status:    Tobacco Use   • Smoking status: Current Every Day Smoker     Packs/day: 1.00     Years: 40.00     Pack years: 40.00     Types: Cigarettes   • Smokeless tobacco: Never Used   Substance and Sexual Activity   • Alcohol use: Yes     Alcohol/week: 2.0 standard drinks     Types: 2 Shots of liquor per week     Comment: 2x drinks daily   • Drug use: No   • Sexual activity: Yes     Partners: Female           Objective   Physical Exam  Vitals and nursing note reviewed.   Constitutional:       General: He is not in acute distress.     Appearance: He is well-developed. He is not toxic-appearing.    HENT:      Head: Normocephalic and atraumatic.      Jaw: No trismus.      Right Ear: External ear normal.      Left Ear: External ear normal.      Nose: Nose normal.      Mouth/Throat:      Mouth: Mucous membranes are moist. Mucous membranes are not dry. No oral lesions.      Dentition: No dental abscesses.      Pharynx: Oropharynx is clear. No posterior oropharyngeal erythema or uvula swelling.      Tonsils: No tonsillar exudate or tonsillar abscesses.   Eyes:      General:         Right eye: No discharge.         Left eye: No discharge.      Extraocular Movements: Extraocular movements intact.      Conjunctiva/sclera: Conjunctivae normal.      Right eye: Right conjunctiva is not injected.      Left eye: Left conjunctiva is not injected.      Pupils: Pupils are equal, round, and reactive to light.   Neck:      Vascular: No JVD.      Trachea: No tracheal tenderness.   Cardiovascular:      Rate and Rhythm: Normal rate and regular rhythm.      Heart sounds: Normal heart sounds. No friction rub. No gallop.       Comments: Old sternotomy scar noted.  Pulmonary:      Effort: Pulmonary effort is normal.      Breath sounds: Wheezing present. No rales.   Chest:      Chest wall: No tenderness.   Abdominal:      General: Bowel sounds are normal. There is no distension.      Palpations: Abdomen is soft. Abdomen is not rigid. There is no mass or pulsatile mass.      Tenderness: There is no abdominal tenderness. There is no guarding or rebound. Negative signs include McBurney's sign.      Comments: No signs of acute abdomen.  No pain at McBurney's point.  No pulsatile abdominal mass.  Right lower quadrant abdominal scar noted.   Musculoskeletal:         General: No tenderness or deformity. Normal range of motion.      Cervical back: Normal range of motion and neck supple. No rigidity. Normal range of motion.   Lymphadenopathy:      Cervical: No cervical adenopathy.   Skin:     General: Skin is warm and dry.      Capillary  Refill: Capillary refill takes 2 to 3 seconds.      Findings: No erythema or rash.      Comments: No diaphoresis, lesions, nevi, petechia, purpura   Neurological:      Mental Status: He is alert and oriented to person, place, and time.      Cranial Nerves: No cranial nerve deficit.      Sensory: No sensory deficit.      Motor: No tremor or abnormal muscle tone.      Comments: 5/5 strength bilaterally with flexion and extension of fingers, wrist, elbows, knees and hips as well as plantar and dorsiflexion of the foot.   Psychiatric:         Attention and Perception: He is attentive.         Speech: Speech normal.         Behavior: Behavior normal.         Thought Content: Thought content normal.         Judgment: Judgment normal.      Comments: Patient somewhat verbally aggressive to nursing staff.  Appears angry, but alert and oriented x4, no slurred speech.  No dysarthria.         Critical Care  Performed by: Bruno Arias MD  Authorized by: Bruno Arias MD     Critical care provider statement:     Critical care time (minutes):  60    Critical care start time:  2/15/2022 11:05 AM    Critical care end time:  2/15/2022 12:05 PM    Critical care was necessary to treat or prevent imminent or life-threatening deterioration of the following conditions:  CNS failure or compromise    Critical care was time spent personally by me on the following activities:  Development of treatment plan with patient or surrogate, discussions with consultants, evaluation of patient's response to treatment, ordering and performing treatments and interventions, ordering and review of laboratory studies, ordering and review of radiographic studies, pulse oximetry, re-evaluation of patient's condition, review of old charts and examination of patient    I assumed direction of critical care for this patient from another provider in my specialty: no                 ED Course  ED Course as of 02/15/22 1544   Tue Feb 15, 2022   0908 I  discussed the case personally with Dr. Brent Botello, the patient's PCP.  We reviewed past medical documentation together.  I was able to find a cardiology note from December 2020.  He evidently sees a Dr. Abebe in [place].  Look like the patient had normal EF and function on an echo in 2019.  Sounds like he has aortic valve replacement and is followed by Dr. Abebe accordingly.  The note indicated that he was scheduled to have another echo at some point in the following year.  Patient is unsure of whether he had this and I suspect he did not.  At this point, we have a patient with a EMS reported post code event who is now alert and oriented x4, GCS of 15, somewhat angry/sullen which PCP confirms is not uncommon.  Patient has no current complaints.  I plan to get a chest x-ray, EKG and labs.  EKG here shows a normal sinus rhythm with a rate of 67 and left bundle branch block.  No previous EKGs available for comparison. [RILEY]   0909 I plan to discuss the case with cardiology here.  I certainly anticipate he will receive Cardiologic evaluation.  We will get a CT of the head as well given the potential for seizure and other etiologies.  I have discussed acute coronary syndrome, pulmonary embolus, cardiac dysrhythmia and other etiologies with the patient. [RILEY]   0942 Patient has been reassessed and is resting comfortably.  Blood pressure 118/87 with a heart rate of 60 and respirations of 22.  CMP shows a sodium 134 with chloride of 96.  Glucose 117.  CBC essentially unremarkable.  Troponin, BNP, urinalysis unremarkable.  I discussed the case with the on-call STEMI physician who agreed that there is no need for immediate intervention in the lab.  He asked that I discussed the case with the in office cardiology  to help arrange Cardiologic consultation at the bedside here in the emergency department and he agreed that the patient would need to come in for further Cardiologic work-up including but not limited to  serial troponins, echocardiography. [RILEY]   1008 CBC is unremarkable.  CMP essentially unremarkable.  BNP and troponin pending.  Urinalysis pending. [RILEY]   1008 Once troponin and BNP are back, I do anticipate having cardiology evaluate the patient at the bedside as previously planned and discussed.  Patient aware. [RILEY]   1043 I reexamined the patient for a third time at approximate 10:30 AM Eastern time.  He was resting comfortably and actually back to baseline.  He was pleasant, communicative, and in no acute distress.  His wife is at the bedside and she provided a more robust story regarding the patient's presentation and initial history.  She was the one that perform CPR and she confirmed that she did.  She confirmed that she did not check a pulse but that he was unresponsive.  She tells me that just before the event, he clutched his chest while he was sitting on the bed and she was concerned they may have had a dysrhythmia or heart attack.  She tells me that she performed CPR until EMS arrived and the patient was confused when they arrived and is now back to baseline.  We talked about the patient and his behavior here/combativeness etc.  Wife left and indicated that she had witnessed this before the patient left the home via EMS.  She told me this is not his baseline.  I found the patient compliant, kind, and in no acute distress and back to what I anticipate is his baseline. [RILEY]   1045 The patient's troponin and BNP are back and are unremarkable.  CBC and CMP essentially unremarkable as well. [RILEY]   1046 Chest x-ray has been reviewed independently by me and also reviewed by radiology.  It shows left basilar atelectasis.  CT of the head without contrast shows no evidence of acute abnormality. [RILEY]   1046 I plan to discuss the case with the inpatient cardiology team for consultation and admission. [RILEY]   1046   I let the wife and patient know about the need for admission and they were agreeable to the plan  without question or complaint.  We have talked about Brugada syndrome, cardiac dysrhythmia, ventricular fibrillation, seizure disorder and other etiologies. [RILEY]   1052 I discussed the case personally with Katelyn, in the cardiology office.  She is sending cardiology down to evaluate the patient but was unsure of exactly who the admitting physician will be.  She asked to call back in a few minutes to get the official admitting name which I will do.  Patient and family aware. [RILEY]   1101 I received a call from [facility] within the cardiology office.  She indicated that Dr. Gallardo would be evaluating the patient at the bedside. [RILEY]   1130 Cardiology is now at the bedside evaluating the patient.  I talked to them personally about the story that I received on initial evaluation and patient presentation, and subsequently once wife arrived.  I shared the lab results and diagnostic study results [RILEY]      ED Course User Index  [RILEY] Bruno Arias MD      Recent Results (from the past 24 hour(s))   ECG 12 Lead    Collection Time: 02/15/22  8:53 AM   Result Value Ref Range    QT Interval 470 ms    QTC Interval 496 ms   Comprehensive Metabolic Panel    Collection Time: 02/15/22  9:02 AM    Specimen: Blood   Result Value Ref Range    Glucose 117 (H) 65 - 99 mg/dL    BUN 15 8 - 23 mg/dL    Creatinine 1.26 0.76 - 1.27 mg/dL    Sodium 134 (L) 136 - 145 mmol/L    Potassium 4.0 3.5 - 5.2 mmol/L    Chloride 96 (L) 98 - 107 mmol/L    CO2 28.0 22.0 - 29.0 mmol/L    Calcium 9.3 8.6 - 10.5 mg/dL    Total Protein 6.3 6.0 - 8.5 g/dL    Albumin 4.00 3.50 - 5.20 g/dL    ALT (SGPT) 22 1 - 41 U/L    AST (SGOT) 25 1 - 40 U/L    Alkaline Phosphatase 74 39 - 117 U/L    Total Bilirubin 0.6 0.0 - 1.2 mg/dL    eGFR Non African Amer 57 (L) >60 mL/min/1.73    Globulin 2.3 gm/dL    A/G Ratio 1.7 g/dL    BUN/Creatinine Ratio 11.9 7.0 - 25.0    Anion Gap 10.0 5.0 - 15.0 mmol/L   Green Top (Gel)    Collection Time: 02/15/22  9:02 AM   Result Value Ref Range     Extra Tube Hold for add-ons.    Lavender Top    Collection Time: 02/15/22  9:02 AM   Result Value Ref Range    Extra Tube hold for add-on    Gold Top - SST    Collection Time: 02/15/22  9:02 AM   Result Value Ref Range    Extra Tube Hold for add-ons.    Gray Top    Collection Time: 02/15/22  9:02 AM   Result Value Ref Range    Extra Tube Hold for add-ons.    Light Blue Top    Collection Time: 02/15/22  9:02 AM   Result Value Ref Range    Extra Tube hold for add-on    CBC Auto Differential    Collection Time: 02/15/22  9:02 AM    Specimen: Blood   Result Value Ref Range    WBC 7.24 3.40 - 10.80 10*3/mm3    RBC 5.13 4.14 - 5.80 10*6/mm3    Hemoglobin 15.8 13.0 - 17.7 g/dL    Hematocrit 45.7 37.5 - 51.0 %    MCV 89.1 79.0 - 97.0 fL    MCH 30.8 26.6 - 33.0 pg    MCHC 34.6 31.5 - 35.7 g/dL    RDW 12.1 (L) 12.3 - 15.4 %    RDW-SD 39.7 37.0 - 54.0 fl    MPV 9.0 6.0 - 12.0 fL    Platelets 204 140 - 450 10*3/mm3    Neutrophil % 72.7 42.7 - 76.0 %    Lymphocyte % 15.2 (L) 19.6 - 45.3 %    Monocyte % 7.6 5.0 - 12.0 %    Eosinophil % 3.3 0.3 - 6.2 %    Basophil % 0.8 0.0 - 1.5 %    Immature Grans % 0.4 0.0 - 0.5 %    Neutrophils, Absolute 5.26 1.70 - 7.00 10*3/mm3    Lymphocytes, Absolute 1.10 0.70 - 3.10 10*3/mm3    Monocytes, Absolute 0.55 0.10 - 0.90 10*3/mm3    Eosinophils, Absolute 0.24 0.00 - 0.40 10*3/mm3    Basophils, Absolute 0.06 0.00 - 0.20 10*3/mm3    Immature Grans, Absolute 0.03 0.00 - 0.05 10*3/mm3    nRBC 0.0 0.0 - 0.2 /100 WBC   Troponin    Collection Time: 02/15/22  9:02 AM    Specimen: Blood   Result Value Ref Range    Troponin T <0.010 0.000 - 0.030 ng/mL   BNP    Collection Time: 02/15/22  9:02 AM    Specimen: Blood   Result Value Ref Range    proBNP 117.6 0.0 - 900.0 pg/mL   Magnesium    Collection Time: 02/15/22  9:02 AM    Specimen: Blood   Result Value Ref Range    Magnesium 1.6 1.6 - 2.4 mg/dL   D-dimer, Quantitative    Collection Time: 02/15/22  9:02 AM    Specimen: Blood   Result Value Ref Range     D-Dimer, Quantitative 0.77 (H) 0.00 - 0.56 MCGFEU/mL   Urinalysis With Microscopic If Indicated (No Culture) - Urine, Clean Catch    Collection Time: 02/15/22 10:36 AM    Specimen: Urine, Clean Catch   Result Value Ref Range    Color, UA Yellow Yellow, Straw    Appearance, UA Clear Clear    pH, UA 5.5 5.0 - 8.0    Specific Gravity, UA 1.021 1.001 - 1.030    Glucose, UA Negative Negative    Ketones, UA Negative Negative    Bilirubin, UA Negative Negative    Blood, UA Negative Negative    Protein,  mg/dL (2+) (A) Negative    Leuk Esterase, UA Negative Negative    Nitrite, UA Negative Negative    Urobilinogen, UA 1.0 E.U./dL 0.2 - 1.0 E.U./dL   Urinalysis, Microscopic Only - Urine, Clean Catch    Collection Time: 02/15/22 10:36 AM    Specimen: Urine, Clean Catch   Result Value Ref Range    RBC, UA 3-6 (A) None Seen, 0-2 /HPF    WBC, UA 0-2 None Seen, 0-2 /HPF    Bacteria, UA None Seen None Seen, Trace /HPF    Squamous Epithelial Cells, UA 0-2 None Seen, 0-2 /HPF    Hyaline Casts, UA 0-6 0 - 6 /LPF    Methodology Automated Microscopy      Note: In addition to lab results from this visit, the labs listed above may include labs taken at another facility or during a different encounter within the last 24 hours. Please correlate lab times with ED admission and discharge times for further clarification of the services performed during this visit.    CT Head Without Contrast   Final Result   1.  An acute intracranial abnormality is not appreciated.       This report was finalized on 2/15/2022 10:16 AM by Loy Hernandez MD.          XR Chest 1 View   Final Result   1.  Left basilar atelectasis.       This report was finalized on 2/15/2022 9:46 AM by Loy Hernandez MD.            Vitals:    02/15/22 1315 02/15/22 1345 02/15/22 1429 02/15/22 1500   BP: 115/89 113/83 142/95    BP Location:   Right arm    Patient Position:   Sitting    Pulse: 68 62 62 62   Resp:   18    Temp:       TempSrc:       SpO2: 91% 93% 90% 90%  "  Weight:   104 kg (229 lb 12.8 oz)    Height:   185.4 cm (73\")      Medications   sodium chloride 0.9 % flush 10 mL (has no administration in time range)   fluticasone (FLONASE) 50 MCG/ACT nasal spray 2 spray (has no administration in time range)   cetirizine (zyrTEC) tablet 10 mg (has no administration in time range)   hydroCHLOROthiazide (HYDRODIURIL) tablet 25 mg (has no administration in time range)   nadolol (CORGARD) tablet 40 mg (has no administration in time range)   tamsulosin (FLOMAX) 24 hr capsule 0.4 mg (has no administration in time range)   nicotine (NICODERM CQ) 21 MG/24HR patch 1 patch (has no administration in time range)   heparin (porcine) 5000 UNIT/ML injection 5,000 Units (has no administration in time range)   melatonin tablet 5 mg (has no administration in time range)     ECG/EMG Results (last 24 hours)     Procedure Component Value Units Date/Time    ECG 12 Lead [[id number]] Collected: 02/15/22 0853     Updated: 02/15/22 0929     QT Interval 470 ms      QTC Interval 496 ms     Narrative:      Test Reason : triage  Blood Pressure :   */*   mmHG  Vent. Rate :  67 BPM     Atrial Rate :  67 BPM     P-R Int : 202 ms          QRS Dur : 168 ms      QT Int : 470 ms       P-R-T Axes :  28   4 100 degrees     QTc Int : 496 ms    Normal sinus rhythm  Left bundle branch block  Abnormal ECG  No previous ECGs available  Confirmed by SEAN PACK MD ([id number]) on 2/15/2022 9:29:20 AM    Referred By: BERE SANDHU           Confirmed By: SEAN PACK MD        ECG 12 Lead   Final Result   Test Reason : triage   Blood Pressure :   */*   mmHG   Vent. Rate :  67 BPM     Atrial Rate :  67 BPM      P-R Int : 202 ms          QRS Dur : 168 ms       QT Int : 470 ms       P-R-T Axes :  28   4 100 degrees      QTc Int : 496 ms      Normal sinus rhythm   Left bundle branch block   Abnormal ECG   No previous ECGs available   Confirmed by SEAN PACK MD ([id number]) on 2/15/2022 9:29:20 AM      Referred By: BERE SANDHU           " Confirmed By: BRUNO ARIAS MD                                                     [facility]    Final diagnoses:   Syncope and collapse   Status post left AV valve replacement       ED Disposition  ED Disposition     ED Disposition Condition Comment    Decision to Admit  Level of Care: Telemetry [5]   Diagnosis: Syncope and collapse [780.2.ICD-9-CM]   Admitting Physician: DREAD RODRIGUEZ [[id number]]   Attending Physician: DREAD RODRIGUEZ [[id number]]   Certification: I Certify That Inpatient Hospital Services Are Medically Necessary For Greater Than 2 Midnights            No follow-up provider specified.       Medication List      No changes were made to your prescriptions during this visit.          Bruno Arias MD  02/15/22 1544

## 2022-02-15 NOTE — H&P
Andrae Garza Jr.  [id number]  1955   LOS: 0 days   Patient Care Team:  PHYSICIAN: Brent Botello MD  UROLOGIST:  George Lomeli MD   FORMER CARDIOLOGIST: Thee Abebe MD   CARDIOVASCULAR SURGEON: Joce Joseph MD    Mr. Garza is a 66-year-old  white male from [place], [place], retired postal .    Chief Complaint:  Syncope and collapse    Problem List:  1. Syncope and collapse  a. Syncopal episode felt to be due to orthostatic hypotension approximately 2-3 months ago with negative EEG-data deficit  b. [facility] ED via EMS 2/15/2022, CPR initiated at home prior to EMS arrival, patient combative initially but back to baseline [facility] ED without apparent EMS findings of arrhythmia/hypotension  2. Bicuspid aortic valve   a. Remote left heart catheterization prior to AVR apparently with normal coronaries-data deficit  b. Status post bioprosthetic aortic valve replacement January 2008 at [facility]-data deficit  c. Last echocardiogram approximately 2019 with normal EF-data deficit  3. LBBB  4. Hypertension  5. Hyperlipidemia  6. Obstructive sleep apnea, noncompliant with CPAP  7. Current tobacco use; 1 pack/day x 40 years, has tried Chantix unsuccessfully in the past  8. Mild obesity: BMI 30.34  9. History of renal cell carcinoma, status post nephrectomy  10. Surgical history:  a. Cholecystectomy  b. AVR  c. Nephrectomy    Allergies   Allergen Reactions   • Norvasc [Amlodipine Besylate] Cough     (Not in a hospital admission)    Scheduled Meds:   Continuous Infusions:   PRN Meds:.•  sodium chloride       History of Present Illness:   This is a 66-year-old white male who presented to [facility] ED 2/15/2022 for syncope and collapse.  The patient apparently grabbed his chest and fell backward and was found unresponsive, apneic, and pulseless and the wife started CPR.  At first he was very combative and confused but this has now resolved.  The patient has never had any seizures and he denies any  "history of Brugada.  He denied, shortness of breath any chest pain, or palpitations.  He remotely had a tissue AVR around 2008 with Dr. Joseph.  He remotely saw Dr. Abebe and last had an echocardiogram approximately 2019.  The patient did not lose any bowel or urine when he became unconscious.  Labs in the ED have been overall unremarkable except sodium 134.  UA negative for UTI.  Chest x-ray showed left basilar atelectasis, CT head negative for CVA.  Vital signs unremarkable.  The patient has a bicuspid aortic valve and is status post porcine tissue valve placement in 2008 with Dr. Joseph.  Prior to surgery he had a heart catheterization and apparently had normal coronaries.  He has never had any MIs or stents in the past.  He also denies any CVAs, TIAs, seizures, DVTs, PEs, COPD, or diabetes mellitus.  Before he had his valve replacement surgery, he had a CT of the chest and was incidentally found to have renal carcinoma.  He had a nephrectomy before he had his AVR.  He was told that his aortic valve area was 0.6 cm² prior to surgery.  His last echocardiogram was apparently around 2019.  The cardiologist that he was seeing left and went to [place] and then the replacement cardiologist he did not care for her so he has not followed up the past year or so.  A couple months ago he had a syncopal episode after he stood up too quickly and passed out.  This is a very brief event and he had an outpatient EEG which apparently was negative for seizures.  Today when he had his syncopal episode he recalls going outside to go get some firewood and then coming back in to sit down on the couch and that is all he remembers.  The patient's wife was blow drying her hair when she heard a loud yell and when she came out to see what her  was yelling about she saw him clutching his chest.  She did CPR on him until EMS arrived.  She felt that he had apneic spells and his color was \"not good.\"  She did not feel for a pulse at " "that time.  The patient denies feeling any chest pain, shortness of breath, palpitations, or dizziness prior to his syncopal episode.  He currently smokes 1 pack/day for 40 years.  He remotely tried Chantix unsuccessfully and recently was prescribed a prescription for Wellbutrin but he has not picked it up because he is unwilling to try this yet. Patient states emphatically he has smoked all his life and has never quit and does not plan to because he \"enjoys it too much.\" His father may have passed away from sudden cardiac death but was found the next morning dead.  Apparently he was having no symptoms prior.  The patient's blood pressure is WNL at home and he adjusts his hydrochlorothiazide dose between 12.5 and 25 mg depending on whether he has any lower extremity edema.    Cardiac risk factors: advanced age (older than 55 for men, 65 for women), dyslipidemia, hypertension, male gender, obesity (BMI >= 30 kg/m2), sedentary lifestyle and smoking/ tobacco exposure.    Social History     Socioeconomic History   • Marital status:    Tobacco Use   • Smoking status: Current Every Day Smoker     Packs/day: 1.00     Years: 40.00     Pack years: 40.00     Types: Cigarettes   • Smokeless tobacco: Never Used   Substance and Sexual Activity   • Alcohol use: Yes     Alcohol/week: 2.0 standard drinks     Types: 2 Shots of liquor per week     Comment: 2x drinks daily   • Drug use: No   • Sexual activity: Yes     Partners: Female     Family History   Family history unknown: Yes       Review of Systems  10 point review of systems was completed, positives outlined in the HPI, and otherwise all other systems are negative.      Objective:       Physical Exam  /88   Pulse 61   Temp 98.6 °F (37 °C) (Oral)   Resp 18   Ht 185.4 cm (73\")   Wt 104 kg (230 lb)   SpO2 94%   BMI 30.34 kg/m²       02/15/22  0857   Weight: 104 kg (230 lb)     Body mass index is 30.34 kg/m².  No intake or output data in the 24 hours ending " 02/15/22 1119    General Appearance:  Alert, cooperative, no distress, appears stated age   Head:  Normocephalic, without obvious abnormality, atraumatic   Neck: Supple, symmetrical, trachea midline, no adenopathy, thyroid: not enlarged, symmetric, no tenderness/mass/nodules, no carotid bruit or JVD   Lungs:   Clear to auscultation bilaterally, respirations unlabored   Heart:  Regular rate and rhythm with LBBB, S1, S2 normal, grade 2/6 murmur, rub or gallop   Abdomen:   Soft, nontender, no masses, no organomegaly, bowel sounds audible x4   Extremities: No edema, normal range of motion   Pulses: 2+ and symmetric   Skin: Skin color, texture, turgor normal, no rashes or lesions   Neurologic: Normal       · Cardiographics:    · EKG 2/15/2022:  Normal sinus rhythm  Left bundle branch block  Abnormal ECG  No previous ECGs available  Confirmed by SEAN PACK MD ([id number]) on 2/15/2022 9:29:20 AM     · Imaging:    · Chest x-ray 2/15/2022: Left basilar atelectasis.    · CT head 2/15/2022: An acute intracranial abnormality is not appreciated    Lab Review:     Results from last 7 days   Lab Units 02/15/22  0902   SODIUM mmol/L 134*   POTASSIUM mmol/L 4.0   CHLORIDE mmol/L 96*   CO2 mmol/L 28.0   BUN mg/dL 15   CREATININE mg/dL 1.26   GLUCOSE mg/dL 117*   CALCIUM mg/dL 9.3     Results from last 7 days   Lab Units 02/15/22  0902   WBC 10*3/mm3 7.24   HEMOGLOBIN g/dL 15.8   HEMATOCRIT % 45.7   PLATELETS 10*3/mm3 204     Results from last 7 days   Lab Units 02/15/22  0902   TROPONIN T ng/mL <0.010      Assessment:   Patient with syncope and collapse with CPR briefly at home prior to [facility] arrival.  He has history of tissue AVR approximately 2008 and chronically has had LBBB.  Patient needs tobacco cessation after discharge. Uncertain concerning etiology of syncopal episode and collapse. Need to exclude significant malignant cardiac arrhythmia. The patient did not sustain any injury with his collapse. No features to suggest  tonic-clonic seizure activity and will defer electroencephalogram at this time. He needs assessment of myocardial and valvular function as well as exclusion of significant focal obstructive coronary artery disease and implantable loop recorder if all studies unremarkable.     Plan:   1. Echocardiogram  2. Cardiac diet  3. Admit patient  4. Magnesium level now  5. N.p.o. after midnight except for meds  6. LHC +/-CBI and ILR in morning  7. Patient needs tobacco cessation after discharge  8. BMP, CBC, ECG, FLP, troponin in morning  9. Full code    Scribed for Eleuterio Gallardo MD by Shirley Valentine, LUIS. 2/15/2022  12:20 EST     I have seen and examined the patient, case was discussed with the physician extender, reviewed the above note, necessary changes were made and I agree with the final note.     Eleuterio Gallardo MD [facility]

## 2022-02-16 LAB
ANION GAP SERPL CALCULATED.3IONS-SCNC: 11 MMOL/L (ref 5–15)
BUN SERPL-MCNC: 15 MG/DL (ref 8–23)
BUN/CREAT SERPL: 11.8 (ref 7–25)
CALCIUM SPEC-SCNC: 9.2 MG/DL (ref 8.6–10.5)
CATH EF ESTIMATED: 50 %
CHLORIDE SERPL-SCNC: 96 MMOL/L (ref 98–107)
CHOLEST SERPL-MCNC: 162 MG/DL (ref 0–200)
CO2 SERPL-SCNC: 29 MMOL/L (ref 22–29)
CREAT SERPL-MCNC: 1.27 MG/DL (ref 0.76–1.27)
DEPRECATED RDW RBC AUTO: 42.2 FL (ref 37–54)
ERYTHROCYTE [DISTWIDTH] IN BLOOD BY AUTOMATED COUNT: 12.2 % (ref 12.3–15.4)
FLUAV RNA RESP QL NAA+PROBE: NOT DETECTED
FLUBV RNA RESP QL NAA+PROBE: NOT DETECTED
GFR SERPL CREATININE-BSD FRML MDRD: 57 ML/MIN/1.73
GLUCOSE SERPL-MCNC: 114 MG/DL (ref 65–99)
HCT VFR BLD AUTO: 44.6 % (ref 37.5–51)
HDLC SERPL-MCNC: 42 MG/DL (ref 40–60)
HGB BLD-MCNC: 14.9 G/DL (ref 13–17.7)
LDLC SERPL CALC-MCNC: 94 MG/DL (ref 0–100)
LDLC/HDLC SERPL: 2.15 {RATIO}
MAGNESIUM SERPL-MCNC: 2.1 MG/DL (ref 1.6–2.4)
MCH RBC QN AUTO: 31.2 PG (ref 26.6–33)
MCHC RBC AUTO-ENTMCNC: 33.4 G/DL (ref 31.5–35.7)
MCV RBC AUTO: 93.3 FL (ref 79–97)
PLATELET # BLD AUTO: 181 10*3/MM3 (ref 140–450)
PMV BLD AUTO: 9.6 FL (ref 6–12)
POTASSIUM SERPL-SCNC: 3.1 MMOL/L (ref 3.5–5.2)
QT INTERVAL: 484 MS
QTC INTERVAL: 514 MS
RBC # BLD AUTO: 4.78 10*6/MM3 (ref 4.14–5.8)
SARS-COV-2 RNA RESP QL NAA+PROBE: NOT DETECTED
SODIUM SERPL-SCNC: 136 MMOL/L (ref 136–145)
TRIGL SERPL-MCNC: 148 MG/DL (ref 0–150)
TROPONIN T SERPL-MCNC: <0.01 NG/ML (ref 0–0.03)
VLDLC SERPL-MCNC: 26 MG/DL (ref 5–40)
WBC NRBC COR # BLD: 6.73 10*3/MM3 (ref 3.4–10.8)

## 2022-02-16 PROCEDURE — 33285 INSJ SUBQ CAR RHYTHM MNTR: CPT | Performed by: INTERNAL MEDICINE

## 2022-02-16 PROCEDURE — 25010000002 FENTANYL CITRATE (PF) 50 MCG/ML SOLUTION: Performed by: INTERNAL MEDICINE

## 2022-02-16 PROCEDURE — S0260 H&P FOR SURGERY: HCPCS | Performed by: INTERNAL MEDICINE

## 2022-02-16 PROCEDURE — 84484 ASSAY OF TROPONIN QUANT: CPT | Performed by: NURSE PRACTITIONER

## 2022-02-16 PROCEDURE — C1894 INTRO/SHEATH, NON-LASER: HCPCS | Performed by: INTERNAL MEDICINE

## 2022-02-16 PROCEDURE — B2151ZZ FLUOROSCOPY OF LEFT HEART USING LOW OSMOLAR CONTRAST: ICD-10-PCS | Performed by: INTERNAL MEDICINE

## 2022-02-16 PROCEDURE — C1764 EVENT RECORDER, CARDIAC: HCPCS

## 2022-02-16 PROCEDURE — 87636 SARSCOV2 & INF A&B AMP PRB: CPT | Performed by: INTERNAL MEDICINE

## 2022-02-16 PROCEDURE — 25010000002 HEPARIN (PORCINE) PER 1000 UNITS: Performed by: INTERNAL MEDICINE

## 2022-02-16 PROCEDURE — 85027 COMPLETE CBC AUTOMATED: CPT | Performed by: NURSE PRACTITIONER

## 2022-02-16 PROCEDURE — 0 CEFAZOLIN IN DEXTROSE 2-4 GM/100ML-% SOLUTION: Performed by: NURSE PRACTITIONER

## 2022-02-16 PROCEDURE — 4A023N7 MEASUREMENT OF CARDIAC SAMPLING AND PRESSURE, LEFT HEART, PERCUTANEOUS APPROACH: ICD-10-PCS | Performed by: INTERNAL MEDICINE

## 2022-02-16 PROCEDURE — 93458 L HRT ARTERY/VENTRICLE ANGIO: CPT | Performed by: INTERNAL MEDICINE

## 2022-02-16 PROCEDURE — 0 IOPAMIDOL PER 1 ML: Performed by: INTERNAL MEDICINE

## 2022-02-16 PROCEDURE — 25010000002 MIDAZOLAM PER 1 MG: Performed by: INTERNAL MEDICINE

## 2022-02-16 PROCEDURE — 83735 ASSAY OF MAGNESIUM: CPT | Performed by: INTERNAL MEDICINE

## 2022-02-16 PROCEDURE — 93005 ELECTROCARDIOGRAM TRACING: CPT | Performed by: NURSE PRACTITIONER

## 2022-02-16 PROCEDURE — 0JH602Z INSERTION OF MONITORING DEVICE INTO CHEST SUBCUTANEOUS TISSUE AND FASCIA, OPEN APPROACH: ICD-10-PCS | Performed by: INTERNAL MEDICINE

## 2022-02-16 PROCEDURE — 80061 LIPID PANEL: CPT | Performed by: NURSE PRACTITIONER

## 2022-02-16 PROCEDURE — C1769 GUIDE WIRE: HCPCS | Performed by: INTERNAL MEDICINE

## 2022-02-16 PROCEDURE — 80048 BASIC METABOLIC PNL TOTAL CA: CPT | Performed by: NURSE PRACTITIONER

## 2022-02-16 PROCEDURE — B2111ZZ FLUOROSCOPY OF MULTIPLE CORONARY ARTERIES USING LOW OSMOLAR CONTRAST: ICD-10-PCS | Performed by: INTERNAL MEDICINE

## 2022-02-16 RX ORDER — CEFAZOLIN SODIUM 2 G/100ML
2 INJECTION, SOLUTION INTRAVENOUS ONCE
Status: COMPLETED | OUTPATIENT
Start: 2022-02-17 | End: 2022-02-17

## 2022-02-16 RX ORDER — SODIUM CHLORIDE 9 MG/ML
3 INJECTION, SOLUTION INTRAVENOUS CONTINUOUS
Status: ACTIVE | OUTPATIENT
Start: 2022-02-16 | End: 2022-02-16

## 2022-02-16 RX ORDER — ACETAMINOPHEN 325 MG/1
650 TABLET ORAL EVERY 4 HOURS PRN
Status: DISCONTINUED | OUTPATIENT
Start: 2022-02-16 | End: 2022-02-17 | Stop reason: HOSPADM

## 2022-02-16 RX ORDER — ASPIRIN 81 MG/1
81 TABLET ORAL DAILY
Status: DISCONTINUED | OUTPATIENT
Start: 2022-02-17 | End: 2022-02-17 | Stop reason: HOSPADM

## 2022-02-16 RX ORDER — LIDOCAINE HYDROCHLORIDE 10 MG/ML
INJECTION, SOLUTION EPIDURAL; INFILTRATION; INTRACAUDAL; PERINEURAL AS NEEDED
Status: DISCONTINUED | OUTPATIENT
Start: 2022-02-16 | End: 2022-02-16 | Stop reason: HOSPADM

## 2022-02-16 RX ORDER — CEFAZOLIN SODIUM 2 G/100ML
2 INJECTION, SOLUTION INTRAVENOUS ONCE
Status: COMPLETED | OUTPATIENT
Start: 2022-02-16 | End: 2022-02-16

## 2022-02-16 RX ORDER — MAGNESIUM SULFATE HEPTAHYDRATE 40 MG/ML
4 INJECTION, SOLUTION INTRAVENOUS AS NEEDED
Status: DISCONTINUED | OUTPATIENT
Start: 2022-02-16 | End: 2022-02-17 | Stop reason: HOSPADM

## 2022-02-16 RX ORDER — MIDAZOLAM HYDROCHLORIDE 1 MG/ML
INJECTION INTRAMUSCULAR; INTRAVENOUS AS NEEDED
Status: DISCONTINUED | OUTPATIENT
Start: 2022-02-16 | End: 2022-02-16 | Stop reason: HOSPADM

## 2022-02-16 RX ORDER — POTASSIUM CHLORIDE 7.45 MG/ML
10 INJECTION INTRAVENOUS
Status: DISCONTINUED | OUTPATIENT
Start: 2022-02-16 | End: 2022-02-17 | Stop reason: HOSPADM

## 2022-02-16 RX ORDER — CEFAZOLIN SODIUM 2 G/100ML
2 INJECTION, SOLUTION INTRAVENOUS ONCE
Status: DISCONTINUED | OUTPATIENT
Start: 2022-02-16 | End: 2022-02-16 | Stop reason: HOSPADM

## 2022-02-16 RX ORDER — ALPRAZOLAM 0.25 MG/1
0.25 TABLET ORAL 3 TIMES DAILY PRN
Status: DISCONTINUED | OUTPATIENT
Start: 2022-02-16 | End: 2022-02-17 | Stop reason: HOSPADM

## 2022-02-16 RX ORDER — MAGNESIUM SULFATE HEPTAHYDRATE 40 MG/ML
2 INJECTION, SOLUTION INTRAVENOUS AS NEEDED
Status: DISCONTINUED | OUTPATIENT
Start: 2022-02-16 | End: 2022-02-17 | Stop reason: HOSPADM

## 2022-02-16 RX ORDER — CEFAZOLIN SODIUM 1 G/3ML
1 INJECTION, POWDER, FOR SOLUTION INTRAMUSCULAR; INTRAVENOUS ONCE
Status: DISCONTINUED | OUTPATIENT
Start: 2022-02-16 | End: 2022-02-16

## 2022-02-16 RX ORDER — DIPHENHYDRAMINE HYDROCHLORIDE 50 MG/ML
25 INJECTION INTRAMUSCULAR; INTRAVENOUS EVERY 6 HOURS PRN
Status: DISCONTINUED | OUTPATIENT
Start: 2022-02-16 | End: 2022-02-17 | Stop reason: HOSPADM

## 2022-02-16 RX ORDER — ONDANSETRON 2 MG/ML
4 INJECTION INTRAMUSCULAR; INTRAVENOUS EVERY 6 HOURS PRN
Status: DISCONTINUED | OUTPATIENT
Start: 2022-02-16 | End: 2022-02-17 | Stop reason: HOSPADM

## 2022-02-16 RX ORDER — POTASSIUM CHLORIDE 750 MG/1
40 CAPSULE, EXTENDED RELEASE ORAL AS NEEDED
Status: DISCONTINUED | OUTPATIENT
Start: 2022-02-16 | End: 2022-02-17 | Stop reason: HOSPADM

## 2022-02-16 RX ORDER — ASPIRIN 81 MG/1
324 TABLET, CHEWABLE ORAL ONCE
Status: COMPLETED | OUTPATIENT
Start: 2022-02-16 | End: 2022-02-16

## 2022-02-16 RX ORDER — NITROGLYCERIN 0.4 MG/1
0.4 TABLET SUBLINGUAL
Status: DISCONTINUED | OUTPATIENT
Start: 2022-02-16 | End: 2022-02-17 | Stop reason: HOSPADM

## 2022-02-16 RX ORDER — CEFAZOLIN SODIUM 2 G/100ML
2 INJECTION, SOLUTION INTRAVENOUS ONCE
Status: DISCONTINUED | OUTPATIENT
Start: 2022-02-16 | End: 2022-02-16

## 2022-02-16 RX ORDER — FENTANYL CITRATE 50 UG/ML
INJECTION, SOLUTION INTRAMUSCULAR; INTRAVENOUS AS NEEDED
Status: DISCONTINUED | OUTPATIENT
Start: 2022-02-16 | End: 2022-02-16 | Stop reason: HOSPADM

## 2022-02-16 RX ORDER — POTASSIUM CHLORIDE 1.5 G/1.77G
40 POWDER, FOR SOLUTION ORAL AS NEEDED
Status: DISCONTINUED | OUTPATIENT
Start: 2022-02-16 | End: 2022-02-17 | Stop reason: HOSPADM

## 2022-02-16 RX ADMIN — Medication 1 PATCH: at 20:14

## 2022-02-16 RX ADMIN — POTASSIUM CHLORIDE 40 MEQ: 750 CAPSULE, EXTENDED RELEASE ORAL at 07:59

## 2022-02-16 RX ADMIN — POTASSIUM CHLORIDE 40 MEQ: 750 CAPSULE, EXTENDED RELEASE ORAL at 12:22

## 2022-02-16 RX ADMIN — FLUTICASONE PROPIONATE 2 SPRAY: 50 SPRAY, METERED NASAL at 08:00

## 2022-02-16 RX ADMIN — HEPARIN SODIUM 5000 UNITS: 5000 INJECTION, SOLUTION INTRAVENOUS; SUBCUTANEOUS at 20:17

## 2022-02-16 RX ADMIN — ALPRAZOLAM 0.25 MG: 0.25 TABLET ORAL at 20:17

## 2022-02-16 RX ADMIN — ASPIRIN 81 MG 324 MG: 81 TABLET ORAL at 20:17

## 2022-02-16 RX ADMIN — POTASSIUM CHLORIDE 40 MEQ: 750 CAPSULE, EXTENDED RELEASE ORAL at 21:36

## 2022-02-16 RX ADMIN — CEFAZOLIN SODIUM 2 G: 2 INJECTION, SOLUTION INTRAVENOUS at 20:18

## 2022-02-16 RX ADMIN — POTASSIUM CHLORIDE 40 MEQ: 750 CAPSULE, EXTENDED RELEASE ORAL at 20:48

## 2022-02-16 RX ADMIN — HYDROCHLOROTHIAZIDE 25 MG: 25 TABLET ORAL at 08:01

## 2022-02-16 RX ADMIN — TAMSULOSIN HYDROCHLORIDE 0.4 MG: 0.4 CAPSULE ORAL at 20:17

## 2022-02-16 NOTE — CASE MANAGEMENT/SOCIAL WORK
Discharge Planning Assessment  [facility]     Patient Name: Andrae Gazra Jr.  MRN: [id number]  Today's Date: 2/16/2022    Admit Date: 2/15/2022     Discharge Needs Assessment     Row Name 02/16/22 1038       Living Environment    Lives With spouse    Current Living Arrangements home/apartment/condo    Primary Care Provided by self    Provides Primary Care For no one    Family Caregiver if Needed spouse       Resource/Environmental Concerns    Resource/Environmental Concerns none       Transition Planning    Patient/Family Anticipates Transition to home with family    Patient/Family Anticipated Services at Transition     Transportation Anticipated family or friend will provide       Discharge Needs Assessment    Equipment Currently Used at Home none    Concerns to be Addressed substance/tobacco abuse/use  Wife is concerned, patient is not    Discharge Coordination/Progress Spoke with patient at bedside.  Patient resides in a house in [facility] with his wife.  He has no DME, has not used HH in the past, and does not have an AD. Patient's PCP is Brent Botello, and he gets his prescriptions filled at [facility] in [place].  Patient's wife will be able to transport him home at discharge.               Discharge Plan     Row Name 02/16/22 1042       Plan    Plan Home    Patient/Family in Agreement with Plan yes    Plan Comments Spoke with patient at bedside.  Patient resides in a house in [facility] with his wife.  He has no DME, has not used HH in the past, and does not have an AD. Patient's PCP is Brent Botello, and he gets his prescriptions filled at [facility] in [place].  Patient's wife will be able to transport him home at discharge.    Spoke with wife outside of patient's room.  Wife states that she is concerned that patient is addicted to alcohol.  Patient has 1-2 drinks daily.  Wife states that patient remains nice and does not threaten her while drinking, but this is the second time he has collapsed,  and this last time appeared to be seizure in nature.  Have consulted substance abuse team, who will stop by to see patient.     Final Discharge Disposition Code 01 - home or self-care              Continued Care and Services - Admitted Since 2/15/2022    Coordination has not been started for this encounter.       Expected Discharge Date and Time     Expected Discharge Date Expected Discharge Time    Feb 18, 2022          Demographic Summary    No documentation.                Functional Status     Row Name 02/16/22 1038       Functional Status    Usual Activity Tolerance good    Current Activity Tolerance good       Functional Status, IADL    Medications independent    Meal Preparation independent    Housekeeping independent    Laundry independent    Shopping independent               Psychosocial    No documentation.                Abuse/Neglect    No documentation.                Legal    No documentation.                Substance Abuse    No documentation.                Patient Forms    No documentation.                   Rosibel Hutchinson, RN

## 2022-02-16 NOTE — CASE MANAGEMENT/SOCIAL WORK
Continued Stay Note  [facility]     Patient Name: Andrae Garza Jr.  MRN: [id number]  Today's Date: 2/16/2022    Admit Date: 2/15/2022     Discharge Plan     Row Name 02/16/22 1647       Plan    Plan Ongoing    Plan Comments Notified of patient by Rosibel Stoner RN, CM, apparently, the patient's wife has some concerns that he has an alcohol use disorder.  He is currently in cath lab at the time of this note.  We round on him tomorrow.  CIWA thus far= 3.               Discharge Codes    No documentation.               Expected Discharge Date and Time     Expected Discharge Date Expected Discharge Time    Feb 18, 2022             Shilpi Cleaning RN ,BSN   Addiction Coordinator

## 2022-02-17 ENCOUNTER — READMISSION MANAGEMENT (OUTPATIENT)
Dept: CALL CENTER | Facility: HOSPITAL | Age: 67
End: 2022-02-17

## 2022-02-17 VITALS
DIASTOLIC BLOOD PRESSURE: 86 MMHG | RESPIRATION RATE: 18 BRPM | SYSTOLIC BLOOD PRESSURE: 134 MMHG | OXYGEN SATURATION: 95 % | WEIGHT: 229.8 LBS | HEIGHT: 73 IN | BODY MASS INDEX: 30.46 KG/M2 | TEMPERATURE: 97.8 F | HEART RATE: 63 BPM

## 2022-02-17 LAB
ANION GAP SERPL CALCULATED.3IONS-SCNC: 9 MMOL/L (ref 5–15)
BUN SERPL-MCNC: 18 MG/DL (ref 8–23)
BUN/CREAT SERPL: 14.4 (ref 7–25)
CALCIUM SPEC-SCNC: 9.1 MG/DL (ref 8.6–10.5)
CHLORIDE SERPL-SCNC: 100 MMOL/L (ref 98–107)
CHOLEST SERPL-MCNC: 154 MG/DL (ref 0–200)
CO2 SERPL-SCNC: 29 MMOL/L (ref 22–29)
CREAT SERPL-MCNC: 1.25 MG/DL (ref 0.76–1.27)
DEPRECATED RDW RBC AUTO: 42.2 FL (ref 37–54)
ERYTHROCYTE [DISTWIDTH] IN BLOOD BY AUTOMATED COUNT: 12.4 % (ref 12.3–15.4)
GFR SERPL CREATININE-BSD FRML MDRD: 58 ML/MIN/1.73
GLUCOSE SERPL-MCNC: 98 MG/DL (ref 65–99)
HBA1C MFR BLD: 5.3 % (ref 4.8–5.6)
HCT VFR BLD AUTO: 43.2 % (ref 37.5–51)
HDLC SERPL-MCNC: 38 MG/DL (ref 40–60)
HGB BLD-MCNC: 14.7 G/DL (ref 13–17.7)
LDLC SERPL CALC-MCNC: 90 MG/DL (ref 0–100)
LDLC/HDLC SERPL: 2.27 {RATIO}
MCH RBC QN AUTO: 31.6 PG (ref 26.6–33)
MCHC RBC AUTO-ENTMCNC: 34 G/DL (ref 31.5–35.7)
MCV RBC AUTO: 92.9 FL (ref 79–97)
PLATELET # BLD AUTO: 185 10*3/MM3 (ref 140–450)
PMV BLD AUTO: 9.4 FL (ref 6–12)
POTASSIUM SERPL-SCNC: 4.5 MMOL/L (ref 3.5–5.2)
RBC # BLD AUTO: 4.65 10*6/MM3 (ref 4.14–5.8)
SODIUM SERPL-SCNC: 138 MMOL/L (ref 136–145)
TRIGL SERPL-MCNC: 148 MG/DL (ref 0–150)
VLDLC SERPL-MCNC: 26 MG/DL (ref 5–40)
WBC NRBC COR # BLD: 6.86 10*3/MM3 (ref 3.4–10.8)

## 2022-02-17 PROCEDURE — 25010000002 HEPARIN (PORCINE) PER 1000 UNITS: Performed by: INTERNAL MEDICINE

## 2022-02-17 PROCEDURE — 80061 LIPID PANEL: CPT | Performed by: INTERNAL MEDICINE

## 2022-02-17 PROCEDURE — 99238 HOSP IP/OBS DSCHRG MGMT 30/<: CPT | Performed by: INTERNAL MEDICINE

## 2022-02-17 PROCEDURE — 80048 BASIC METABOLIC PNL TOTAL CA: CPT | Performed by: INTERNAL MEDICINE

## 2022-02-17 PROCEDURE — 83036 HEMOGLOBIN GLYCOSYLATED A1C: CPT | Performed by: INTERNAL MEDICINE

## 2022-02-17 PROCEDURE — 85027 COMPLETE CBC AUTOMATED: CPT | Performed by: INTERNAL MEDICINE

## 2022-02-17 PROCEDURE — 0 CEFAZOLIN IN DEXTROSE 2-4 GM/100ML-% SOLUTION: Performed by: INTERNAL MEDICINE

## 2022-02-17 RX ORDER — MAGNESIUM OXIDE 400 MG/1
400 TABLET ORAL DAILY
Qty: 90 TABLET | Refills: 2 | Status: SHIPPED | OUTPATIENT
Start: 2022-02-17 | End: 2022-05-26

## 2022-02-17 RX ORDER — POTASSIUM CHLORIDE 750 MG/1
20 CAPSULE, EXTENDED RELEASE ORAL DAILY
Qty: 180 CAPSULE | Refills: 2 | Status: SHIPPED | OUTPATIENT
Start: 2022-02-17 | End: 2022-05-26

## 2022-02-17 RX ORDER — NICOTINE 21 MG/24HR
1 PATCH, TRANSDERMAL 24 HOURS TRANSDERMAL
Qty: 60 PATCH | Refills: 0 | Status: SHIPPED | OUTPATIENT
Start: 2022-02-18 | End: 2022-03-24

## 2022-02-17 RX ADMIN — CEFAZOLIN SODIUM 2 G: 2 INJECTION, SOLUTION INTRAVENOUS at 04:36

## 2022-02-17 RX ADMIN — ASPIRIN 81 MG: 81 TABLET, COATED ORAL at 08:07

## 2022-02-17 RX ADMIN — HEPARIN SODIUM 5000 UNITS: 5000 INJECTION, SOLUTION INTRAVENOUS; SUBCUTANEOUS at 08:07

## 2022-02-17 RX ADMIN — NADOLOL 40 MG: 20 TABLET ORAL at 08:07

## 2022-02-17 RX ADMIN — HYDROCHLOROTHIAZIDE 25 MG: 25 TABLET ORAL at 08:07

## 2022-02-17 RX ADMIN — CETIRIZINE HYDROCHLORIDE 10 MG: 10 TABLET, FILM COATED ORAL at 08:06

## 2022-02-17 RX ADMIN — FLUTICASONE PROPIONATE 2 SPRAY: 50 SPRAY, METERED NASAL at 08:08

## 2022-02-17 RX ADMIN — Medication 1 PATCH: at 08:07

## 2022-02-17 NOTE — PROGRESS NOTES
Andrae Garza Jr.  [id number]  1955   LOS: 2 days   Patient Care Team:  Brent Botello MD as PCP - General (Family Medicine)  George Lomeli MD as Consulting Physician (Urology)  Thee Abebe MD (Cardiology)    Chief Complaint:  REMOTE AVR / TOBACCO / ETOH / SYNCOPE    Subjective     RNs note nocturnal oxygen desaturation.  The patient and wife state oxygen desaturation probably due to a defective oximetry probe.  The patient is currently asymptomatic and denies anginal type chest discomfort, increased tachypnea/dyspnea, nausea, emesis, abdominal pain, headache, or focal motor-sensory changes.  He is eager for discharge home.  He has had no difficulty with the right wrist heart catheterization site but has had some oozing from implantable loop recorder site.  Occasional nonproductive cough.    Objective     Vital Sign Min/Max for last 24 hours  Temp  Min: 97.8 °F (36.6 °C)  Max: 99 °F (37.2 °C)   BP  Min: 108/96  Max: 140/97   Pulse  Min: 53  Max: 73   Resp  Min: 18  Max: 18   SpO2  Min: 88 %  Max: 96 %               02/15/22  0857 02/15/22  1429   Weight: 104 kg (230 lb) 104 kg (229 lb 12.8 oz)       No intake or output data in the 24 hours ending 02/17/22 0711    Physical Exam:     General Appearance:    Alert, cooperative, in no acute distress   Lungs:     Clear to auscultation,respirations regular, even and                   unlabored    Heart:    Regular and normal rate, normal S1 and S2, grade 2/6 systolic murmur, no gallop, no rub, no click   Abdomen:  Extremities:   Soft, non-tender, bowel sounds audible x4    No edema, normal range of motion   Pulses:   Pulses palpable and equal bilaterally  Right wrist and ILR site CDI      Results Review:   Results from last 7 days   Lab Units 02/17/22  0125 02/16/22  0339 02/16/22  0339 02/15/22  0902 02/15/22  0902   SODIUM mmol/L 138  --  136  --  134*   POTASSIUM mmol/L 4.5  --  3.1*  --  4.0   CHLORIDE mmol/L 100  --  96*  --  96*   CO2 mmol/L  29.0  --  29.0  --  28.0   BUN mg/dL 18  --  15  --  15   CREATININE mg/dL 1.25  --  1.27  --  1.26   GLUCOSE mg/dL 98   < > 114*   < > 117*   CALCIUM mg/dL 9.1  --  9.2  --  9.3    < > = values in this interval not displayed.     Results from last 7 days   Lab Units 02/17/22  0125 02/16/22  0339 02/15/22  0902   WBC 10*3/mm3 6.86 6.73 7.24   HEMOGLOBIN g/dL 14.7 14.9 15.8   HEMATOCRIT % 43.2 44.6 45.7   PLATELETS 10*3/mm3 185 181 204     Results from last 7 days   Lab Units 02/17/22  0125   CHOLESTEROL mg/dL 154   TRIGLYCERIDES mg/dL 148   HDL CHOL mg/dL 38*   LDL CHOL mg/dL 90     Results from last 7 days   Lab Units 02/17/22  0125   HEMOGLOBIN A1C % 5.30     Results from last 7 days   Lab Units 02/16/22  0339 02/15/22  0902   TROPONIN T ng/mL <0.010 <0.010     · NO EKG / CXR.    · LHC:    · Normal coronary arteries  · LVEF 50 to 55%  · Mildly dilated aortic root    · EP STUDY:    IMPRESSION: Successful [facility] Quality implantable loop recorder.  Serial #[id number]    Medication Review: REVIEWED; NO DRIPS.    Assessment/Plan     Currently stable.  He does need outpatient assessment for possible obstructive sleep apnea although remotely he states he was tested and this was not a problem.  I have strongly urged him to limit his alcohol use and to totally eliminate all tobacco use.  I am perplexed by this presenting episode of syncope and suspect in retrospect probable vasovagal cause; will defer tilt table test at this time as well as electroencephalogram.  He wishes to resume follow-up care and monitoring with [facility] in the [place] office.  He is undergoing instruction in monitoring with his implantable loop recorder.  Discharge medications and follow-up as outlined below:    · Wellbutrin  mg BID  · Allegra 60 mg daily  · HCTZ 25 mg daily  · K. Dur 20 mEq daily  · Oromag 400 mg daily  · Nadolol 40 mg daily  · Prostate p.o. daily  · Tamsulosin 0.4 mg at bedtime daily  · Flonase 2 sprays twice daily as  needed  · [facility] Cardiology follow-up [place] office with Jay Alcala / Vinay 1 month  · Outpatient [facility] Sleep Medicine consultation referral to consider home study    Discussed with patient and wife in room.      Syncope and collapse    02/17/22  07:11 EST

## 2022-02-17 NOTE — CASE MANAGEMENT/SOCIAL WORK
Continued Stay Note   [facility]     Patient Name: Andrae Garza Jr.  MRN: [id number]  Today's Date: 2/17/2022    Admit Date: 2/15/2022     Discharge Plan     Row Name 02/17/22 1048       Plan    Plan Discharge    Plan Comments Met with patient at bedside. Patient states that he doesn't feel he is an alcoholic, but admits his choice of alcohol is bourbon. Patient reports that he has been drinking way to much the last several weeks. Patient was pleasant to talk to, oriented and engaged.       Final Note I left patient with resources on how to get help when he has a desire to drink.    From the addiction teams perspective he is cleared for discharge.    Thank you very much for this consult.               Discharge Codes    No documentation.               Expected Discharge Date and Time     Expected Discharge Date Expected Discharge Time    Feb 17, 2022             Perfecto Gomez, [facility]

## 2022-02-17 NOTE — DISCHARGE SUMMARY
Date of Discharge:  2/17/2022    Patient Care Team:  Brent Botello MD as PCP - General (Family Medicine)  George Lomeli MD as Consulting Physician (Urology)  Thee Abebe MD (Cardiology)    Discharge Diagnosis: Syncope: Resolved    Presenting Problem  Syncope and collapse [R55]    Allergies   Allergen Reactions   • Norvasc [Amlodipine Besylate] Cough       Discharge Medications     Discharge Medications      New Medications      Instructions Start Date   magnesium oxide 400 MG tablet  Commonly known as: MAG-OX   400 mg, Oral, Daily      nicotine 21 MG/24HR patch  Commonly known as: NICODERM CQ   1 patch, Transdermal, Every 24 Hours Scheduled   Start Date: February 18, 2022     potassium chloride 10 MEQ CR capsule  Commonly known as: MICRO-K   20 mEq, Oral, Daily         Continue These Medications      Instructions Start Date   buPROPion  MG 12 hr tablet  Commonly known as: Wellbutrin SR   One po daily for 1 week then one po BID      fexofenadine 60 MG tablet  Commonly known as: ALLEGRA   60 mg, Oral, Daily      fluticasone 50 MCG/ACT nasal spray  Commonly known as: FLONASE   2 sprays, Nasal, Daily      hydroCHLOROthiazide 25 MG tablet  Commonly known as: HYDRODIURIL   25 mg, Oral, Daily      nadolol 40 MG tablet  Commonly known as: CORGARD   40 mg, Oral, Daily      PROSTATE PO   Oral, Ideal prostate (prostate and bladder health formula)       tamsulosin 0.4 MG capsule 24 hr capsule  Commonly known as: Flomax   0.4 mg, Oral, Daily         Stop These Medications    naproxen 500 MG tablet  Commonly known as: Naprosyn            Procedures Performed  Procedure(s):  Left Heart Cath 2/16/2022:  · Normal coronary arteries  · LVEF 50 to 55%  · Mildly dilated aortic root      Loop insertion 2/16/2022: Successful [employer] implantable loop recorder.  Serial #[id number]    ECG x2    Echocardiogram 2/15/2022:  · There is a bioprosthetic aortic valve present with acceptable hemodynamics and leaflet  excursion with suboptimal visualization.  · Estimated right ventricular systolic pressure from tricuspid regurgitation is normal (<35 mmHg).  · Moderate dilation of the aortic root and of the sinuses of Valsalva present.  · The following left ventricular wall segments are hypokinetic: mid anterior, apical anterior, apical septal and apex hypokinetic.  · The left ventricular cavity is borderline dilated.  · Estimated left ventricular EF = 50% Estimated left ventricular EF was in agreement with the calculated left ventricular EF. Left ventricular ejection fraction appears to be 46 - 50%. Left ventricular systolic function is low normal.  · Left ventricular diastolic function is consistent with (grade I) impaired relaxation.  · Normal right ventricular cavity size and wall thickness noted with moderately reduced right ventricular systolic function; abnormal septal motion consistent with bundle branch block.  · No evidence of pulmonary hypertension is present.  · There is no evidence of pericardial effusion.  · Technically difficult and limited study.    Chest x-ray 2/15/2022:  Left basilar atelectasis    CT head 2/15/2022:An acute intracranial abnormality is not appreciated     Continual telemetry monitoring    Carotid duplex 2/15/2022:  · Proximal right internal carotid artery is normal.  · Proximal left internal carotid artery plaque without significant stenosis.  · Left internal carotid artery stenosis of 0-49%.  · Normal antegrade bilateral vertebral artery flow demonstrated.        History of Present Illness  This is a 66-year-old white male who presented to [facility] ED 2/15/2022 for syncope and collapse.  The patient apparently grabbed his chest and fell backward and was found unresponsive, apneic, and pulseless and the wife started CPR.  At first he was very combative and confused but this has now resolved.  The patient has never had any seizures and he denies any history of Brugada.  He denied, shortness of breath  "any chest pain, or palpitations.  He remotely had a tissue AVR around 2008 with Dr. Joseph.  He remotely saw Dr. Aebbe and last had an echocardiogram approximately 2019.  The patient did not lose any bowel or urine when he became unconscious.  Labs in the ED have been overall unremarkable except sodium 134.  UA negative for UTI.  Chest x-ray showed left basilar atelectasis, CT head negative for CVA.  Vital signs unremarkable.  The patient has a bicuspid aortic valve and is status post porcine tissue valve placement in 2008 with Dr. Joseph.  Prior to surgery he had a heart catheterization and apparently had normal coronaries.  He has never had any MIs or stents in the past.  He also denies any CVAs, TIAs, seizures, DVTs, PEs, COPD, or diabetes mellitus.  Before he had his valve replacement surgery, he had a CT of the chest and was incidentally found to have renal carcinoma.  He had a nephrectomy before he had his AVR.  He was told that his aortic valve area was 0.6 cm² prior to surgery.  His last echocardiogram was apparently around 2019.  The cardiologist that he was seeing left and went to [place] and then the replacement cardiologist he did not care for her so he has not followed up the past year or so.  A couple months ago he had a syncopal episode after he stood up too quickly and passed out.  This is a very brief event and he had an outpatient EEG which apparently was negative for seizures.  Today when he had his syncopal episode he recalls going outside to go get some firewood and then coming back in to sit down on the couch and that is all he remembers.  The patient's wife was blow drying her hair when she heard a loud yell and when she came out to see what her  was yelling about she saw him clutching his chest.  She did CPR on him until EMS arrived.  She felt that he had apneic spells and his color was \"not good.\"  She did not feel for a pulse at that time.  The patient denies feeling any chest " "pain, shortness of breath, palpitations, or dizziness prior to his syncopal episode.  He currently smokes 1 pack/day for 40 years.  He remotely tried Chantix unsuccessfully and recently was prescribed a prescription for Wellbutrin but he has not picked it up because he is unwilling to try this yet. Patient states emphatically he has smoked all his life and has never quit and does not plan to because he \"enjoys it too much.\" His father may have passed away from sudden cardiac death but was found the next morning dead.  Apparently he was having no symptoms prior.  The patient's blood pressure is WNL at home and he adjusts his hydrochlorothiazide dose between 12.5 and 25 mg depending on whether he has any lower extremity edema.    Cardiovascular Disease Risk Factors  hyptertension, hyperlipidemia, tobacco abuse, obesity, increased age, male gender    Hospital Course  Patient is a 66 y.o. white male who presented to [facility] ED 2/15/2022 for syncope and collapse.  His wife performed CPR at home prior to EMS arrival.  He was combative initially but was back to baseline by the time he arrived in [facility] ED.  The patient was admitted and had a heart catheterization on 2/16/2022 which demonstrated normal coronary arteries, EF 50-55% with mildly dilated aortic root.  He also had a [employer] Lux-DX implantable loop recorder implanted same day.  His carotid duplex was acceptable and echocardiogram showed bioprosthetic aortic valve with moderate dilation of the aortic root, EF 50%.  He was encouraged for tobacco cessation after discharge and to limit his alcohol use.  He was also recommended to have an outpatient referral to sleep medicine for possible obstructive sleep apnea.  Syncopal episode was suspected to be a vasovagal cause and tilt table test was deferred.  He was discharged home in stable condition 2/17/2022 with follow-up appointments listed below.    Labs  Results from last 7 days   Lab Units 02/17/22  0125   SODIUM " mmol/L 138   POTASSIUM mmol/L 4.5   CHLORIDE mmol/L 100   CO2 mmol/L 29.0   BUN mg/dL 18   CREATININE mg/dL 1.25   GLUCOSE mg/dL 98   CALCIUM mg/dL 9.1     Results from last 7 days   Lab Units 02/17/22  0125   WBC 10*3/mm3 6.86   HEMOGLOBIN g/dL 14.7   HEMATOCRIT % 43.2   PLATELETS 10*3/mm3 185     Results from last 7 days   Lab Units 02/17/22  0125   CHOLESTEROL mg/dL 154   TRIGLYCERIDES mg/dL 148   HDL CHOL mg/dL 38*   LDL CHOL mg/dL 90     Results from last 7 days   Lab Units 02/17/22  0125   HEMOGLOBIN A1C % 5.30     Results from last 7 days   Lab Units 02/16/22  0339 02/15/22  0902   TROPONIN T ng/mL <0.010 <0.010          Vital Signs  Temp:  [97.8 °F (36.6 °C)-99 °F (37.2 °C)] 97.8 °F (36.6 °C)  Heart Rate:  [53-73] 63  Resp:  [18] 18  BP: (108-137)/() 134/86  Temp  Min: 97.8 °F (36.6 °C)  Max: 99 °F (37.2 °C)   BP  Min: 108/96  Max: 137/99   Pulse  Min: 53  Max: 73   Resp  Min: 18  Max: 18   SpO2  Min: 88 %  Max: 96 %   Flow (L/min)  Min: 2  Max: 2        Discharge Disposition: Stable  Home or Self Care    Discharge Diet: Cardiac    Activity at Discharge: As tolerated    Follow-up Appointments  Future Appointments   Date Time Provider Department Center   4/1/2022  8:30 AM Brent Botello MD [facility] [place] [place] [facility]     Additional Instructions for the Follow-ups that You Need to Schedule     Discharge Follow-up with Specified Provider: Dr Fredrick Mclean in the [place] office; 1 Month   As directed      To: Dr Fredrick Mclean in the [place] office    Follow Up: 1 Month               Test Results Pending at Discharge: None       Electronically signed by LUIS Maldonado, 02/17/22, 8:51 AM EST.    I have seen and examined the patient, case was discussed with the physician extender, reviewed the above note, necessary changes were made and I agree with the final note.     Eleuterio Gallardo MD [facility]

## 2022-02-17 NOTE — OUTREACH NOTE
Prep Survey      Responses   [facility] facility patient discharged from? [place]   Is LACE score < 7 ? No   Emergency Room discharge w/ pulse ox? No   Eligibility [facility]   Date of Admission 02/15/22   Date of Discharge 02/17/22   Discharge Disposition Home or Self Care   Discharge diagnosis Syncope and collapse-left heart cath this visit   Does the patient have one of the following disease processes/diagnoses(primary or secondary)? Other   Does the patient have Home health ordered? No   Is there a DME ordered? No   Prep survey completed? Yes          Manisha Perez RN

## 2022-02-18 ENCOUNTER — TRANSITIONAL CARE MANAGEMENT TELEPHONE ENCOUNTER (OUTPATIENT)
Dept: CALL CENTER | Facility: HOSPITAL | Age: 67
End: 2022-02-18

## 2022-02-18 NOTE — OUTREACH NOTE
Call Center TCM Note      Responses   [facility] patient discharged from? [facility]   Does the patient have one of the following disease processes/diagnoses(primary or secondary)? Other   TCM attempt successful? Yes   Call start time 0951   Call end time 0957   Discharge diagnosis Syncope and collapse-left heart cath this visit   Person spoke with today (if not patient) and relationship Martine(Spouse)   Meds reviewed with patient/caregiver? Yes   Is the patient having any side effects they believe may be caused by any medication additions or changes? No   Does the patient have all medications ordered at discharge? Yes   Is the patient taking all medications as directed (includes completed medication regime)? No   What is preventing the patient from taking all medications as directed? --  [Spouse will  medications from pharmacy this am.]   Nursing Interventions Nurse provided patient education   Comments regarding appointments Hospital f/u appt. with PCP Dr. Botello 3/1/22 @ 12pm.   Does the patient have a primary care provider?  Yes   Does the patient have an appointment with their PCP within 7 days of discharge? Greater than 7 days   Nursing Interventions Verified appointment date/time/provider   Has the patient kept scheduled appointments due by today? N/A   Has home health visited the patient within 72 hours of discharge? N/A   Psychosocial issues? No   Did the patient receive a copy of their discharge instructions? Yes   Nursing interventions Reviewed instructions with patient   What is the patient's perception of their health status since discharge? Improving   Is the patient/caregiver able to teach back signs and symptoms related to disease process for when to call PCP? Yes   Is the patient/caregiver able to teach back signs and symptoms related to disease process for when to call 911? Yes   Is the patient/caregiver able to teach back the hierarchy of who to call/visit for symptoms/problems? PCP,  Specialist, Home health nurse, Urgent Care, ED, 911 Yes   If the patient is a current smoker, are they able to teach back resources for cessation? Smoking cessation medications   TCM call completed? Yes          Saranya Rangel RN    2/18/2022, 09:59 EST

## 2022-02-28 ENCOUNTER — READMISSION MANAGEMENT (OUTPATIENT)
Dept: CALL CENTER | Facility: HOSPITAL | Age: 67
End: 2022-02-28

## 2022-02-28 NOTE — OUTREACH NOTE
Medical Week 2 Survey      Responses   [facility] patient discharged from? [place]   Does the patient have one of the following disease processes/diagnoses(primary or secondary)? Other   Week 2 attempt successful? Yes   Call start time 0953   Discharge diagnosis Syncope and collapse-left heart cath this visit   Call end time 0959   Person spoke with today (if not patient) and relationship Martine(, Spouse)   Meds reviewed with patient/caregiver? Yes   Is the patient having any side effects they believe may be caused by any medication additions or changes? No   Does the patient have all medications ordered at discharge? Yes   Is the patient taking all medications as directed (includes completed medication regime)? No  [not using Nicoderm patches]   What is preventing the patient from taking all medications as directed? Other  [does not wish to quit smoking, chooses not to wear Nicoderm patches]   Nursing Interventions Nurse provided patient education   Does the patient have a primary care provider?  Yes   Has the patient kept scheduled appointments due by today? N/A   Has home health visited the patient within 72 hours of discharge? N/A   Psychosocial issues? No   Did the patient receive a copy of their discharge instructions? Yes   Nursing interventions Reviewed instructions with patient   What is the patient's perception of their health status since discharge? Improving   Is the patient/caregiver able to teach back signs and symptoms related to disease process for when to call PCP? Yes   Is the patient/caregiver able to teach back signs and symptoms related to disease process for when to call 911? Yes   Is the patient/caregiver able to teach back the hierarchy of who to call/visit for symptoms/problems? PCP, Specialist, Home health nurse, Urgent Care, ED, 911 Yes   Additional teach back comments wife states pt does not wish to quit smoking, has had no syncopy, wife states will not let pt drive due to chance  of recurrence of passing out episodes   Week 2 Call Completed? Yes          Sera Montenegro RN

## 2022-03-01 ENCOUNTER — OFFICE VISIT (OUTPATIENT)
Dept: FAMILY MEDICINE CLINIC | Facility: CLINIC | Age: 67
End: 2022-03-01

## 2022-03-01 VITALS
HEART RATE: 55 BPM | HEIGHT: 74 IN | BODY MASS INDEX: 29.77 KG/M2 | OXYGEN SATURATION: 98 % | DIASTOLIC BLOOD PRESSURE: 74 MMHG | WEIGHT: 232 LBS | SYSTOLIC BLOOD PRESSURE: 110 MMHG | RESPIRATION RATE: 18 BRPM | TEMPERATURE: 98.5 F

## 2022-03-01 DIAGNOSIS — R40.0 HAS DAYTIME DROWSINESS: ICD-10-CM

## 2022-03-01 DIAGNOSIS — R55 SYNCOPE, UNSPECIFIED SYNCOPE TYPE: ICD-10-CM

## 2022-03-01 DIAGNOSIS — G47.33 OBSTRUCTIVE SLEEP APNEA SYNDROME: ICD-10-CM

## 2022-03-01 DIAGNOSIS — R06.81 WITNESSED EPISODE OF APNEA: ICD-10-CM

## 2022-03-01 DIAGNOSIS — Z09 HOSPITAL DISCHARGE FOLLOW-UP: Primary | ICD-10-CM

## 2022-03-01 DIAGNOSIS — I10 ESSENTIAL HYPERTENSION: ICD-10-CM

## 2022-03-01 DIAGNOSIS — R06.83 SNORING: ICD-10-CM

## 2022-03-01 DIAGNOSIS — E87.6 HYPOKALEMIA: ICD-10-CM

## 2022-03-01 PROCEDURE — 99495 TRANSJ CARE MGMT MOD F2F 14D: CPT | Performed by: FAMILY MEDICINE

## 2022-03-01 PROCEDURE — 1111F DSCHRG MED/CURRENT MED MERGE: CPT | Performed by: FAMILY MEDICINE

## 2022-03-01 NOTE — PROGRESS NOTES
Transitional Care Follow Up Visit  Subjective     Andrae Garza Jr. is a 66 y.o. male who presents for a transitional care management visit.    Hospital follow-up  The patient presents today accompanied by his wife who contributes to his history. His wife explains that the patient was unresponsive and she had to perform CPR for approximately 3 to 4 minutes. The patient's wife states that the patient woke up quickly, and rolled over on his side. The patient's wife reports he was unconscious. The patient's wife states when the ambulance arrived, while they were working on her , and he was very combative, unaware of where he was or what was going on. The patient explains that he has no memory of what happened at the house, or that he was in an ambulance. He states they were asking him questions in the ambulance, and he was unable to answer them. The patient's wife notes this is the second time that this has happened. He states he has no memory of being in the emergency room. The patient inquires about driving. He reports the day of the episode, he got out of bed, went outside and had a cigarette, got a load of wood and started the fireplace, and sat down. He yelled for his wife, grabbed his chest and fell over on the couch. His wife called 911, and then started CPR. He reports he is taking more naps now. The patient states he sleeps 12 hours a night.     Fall  The patient's wife notes the patient had a fall on 08/30/2021. The patient reports at the time of the fall, he thought it was low blood pressure. He had his head checked after this fall.     Imaging  The patient's wife notes that Dr Sweeney ordered a CT scan and an EMG test in 09/2021. The patient reports the hospital did an EKG, a heart catherization, and is unsure of what other tests were completed. His wife notes the hospital did blood work, and checked cardiac enzymes.    Implantable heart monitor  His wife reports the patient has an implantable  heart monitor.     Cardiologist  His wife notes that Dr. Mclean and Dr. Alcala will be the patient's cardiologists. His wife reports that Dr. Mclean performed the heart catherization surgery. The patient has an appointment with his cardiologist on 03/22/2022.     Medication  He notes that he has been prescribed potassium and magnesium. She also notes the patient is to wear a nicotine patch, but his is not doing. The patient states he is taking 2 potassium pills, and one magnesium pill. His wife notes he never started the Wellbutrin he was prescribed.     Tobacco use  The patient reports he continues to smoke, but is trying to cease.     Hematoma  He notes that he has a huge hematoma.     Cardiac surgery  The patient reports that he had a heart valve replaced, 14 years ago. His wife notes that the patient had a aortic root replacement. The patient reports he had a calcium deposit outside of the bicuspid valve, allowing the blood to shoot out sideways, making the aorta enlarge. The patient notes the only other heart catherization he had, was prior to the heart valve replacement. The heart valve surgery was performed by Dr. Joseph.     Blood pressure  He notes his blood pressure was elevated for a few days.     Sleep study  The patient's wife notes that Dr. Gallardo recommended for the patient to have a sleep study completed. He explains that he had a sleep study a long time ago.     Sleeping  His wife reports that the patient snores and occasionally will gasp.     Depression  His wife notes she feels as if the patient is depressed, due to a lot of medical things going on, unable to drive, and being retired.     Within 48 business hours after discharge our office contacted him via telephone to coordinate his care and needs.      I reviewed and discussed the details of that call along with the discharge summary, hospital problems, inpatient lab results, inpatient diagnostic studies, and consultation reports with [facility]    "  Current outpatient and discharge medications have been reconciled for the patient.  Reviewed by: Brent Botello MD      Date of TCM Phone Call 2/17/2022   [facility]   Date of Admission 2/15/2022   Date of Discharge 2/17/2022   Discharge Disposition Home or Self Care     Risk for Readmission (LACE) Score: 7 (2/17/2022  6:01 AM)      History of Present Illness   Course During Hospital Stay:      Per Hosp D/c Summary  \"Hospital Course  Patient is a 66 y.o. white male who presented to [facility] ED 2/15/2022 for syncope and collapse.  His wife performed CPR at home prior to EMS arrival.  He was combative initially but was back to baseline by the time he arrived in [facility] ED.  The patient was admitted and had a heart catheterization on 2/16/2022 which demonstrated normal coronary arteries, EF 50-55% with mildly dilated aortic root.  He also had a [employer] Lux-DX implantable loop recorder implanted same day.  His carotid duplex was acceptable and echocardiogram showed bioprosthetic aortic valve with moderate dilation of the aortic root, EF 50%.  He was encouraged for tobacco cessation after discharge and to limit his alcohol use.  He was also recommended to have an outpatient referral to sleep medicine for possible obstructive sleep apnea.  Syncopal episode was suspected to be a vasovagal cause and tilt table test was deferred.  He was discharged home in stable condition 2/17/2022 with follow-up appointments listed below\"     The following portions of the patient's history were reviewed and updated as appropriate: allergies, current medications, past family history, past medical history, past social history, past surgical history and problem list.    Review of Systems  A review of systems was performed, and the pertinent positives are noted in the HPI.    Objective      Physical Exam  Vitals and nursing note reviewed.   Constitutional:       Appearance: He is well-developed.   HENT:      Head: Normocephalic and " atraumatic.      Right Ear: External ear normal.      Left Ear: External ear normal.   Eyes:      Pupils: Pupils are equal, round, and reactive to light.   Cardiovascular:      Rate and Rhythm: Normal rate and regular rhythm.      Heart sounds: Normal heart sounds.   Pulmonary:      Effort: Pulmonary effort is normal. No respiratory distress.      Breath sounds: Normal breath sounds. No wheezing or rales.   Abdominal:      General: There is no distension.      Tenderness: There is no abdominal tenderness. There is no guarding or rebound.   Musculoskeletal:      Right lower leg: No edema.      Left lower leg: No edema.   Skin:     General: Skin is warm and dry.   Neurological:      Mental Status: He is alert and oriented to person, place, and time.   Psychiatric:         Behavior: Behavior normal.         Assessment/Plan   Problems Addressed this Visit     None      Visit Diagnoses     Hospital discharge follow-up    -  Primary    Syncope, unspecified syncope type        Relevant Orders    Home Sleep Study    Hypokalemia        Relevant Orders    Comprehensive Metabolic Panel    Magnesium    Witnessed episode of apnea        Relevant Orders    Home Sleep Study    Has daytime drowsiness        Relevant Orders    Home Sleep Study    Snoring        Relevant Orders    Home Sleep Study    Obstructive sleep apnea syndrome         Relevant Orders    Home Sleep Study    Essential hypertension          Diagnoses       Codes Comments    Hospital discharge follow-up    -  Primary  Improved overall. Reviewed blood work and testing with the patient and his wife.   ICD-10-CM: Z09  ICD-9-CM: V67.59     Syncope, unspecified syncope type      At time of discharge, they recommended a implantable loop monitor and that has been in place and he will follow up with cardiology on that. He also recommended a sleep study. He reports that he has had previous mild sleep apnea and we will get another sleep study and we will do that at home.    ICD-10-CM: R55  ICD-9-CM: 780.2     Hypokalemia      Recheck CMP and magnesium level.   ICD-10-CM: E87.6  ICD-9-CM: 276.8     Witnessed episode of apnea   We will check sleep study.      ICD-10-CM: R06.81  ICD-9-CM: 786.03     Has daytime drowsiness     ICD-10-CM: R40.0  ICD-9-CM: 780.09     Snoring     ICD-10-CM: R06.83  ICD-9-CM: 786.09     Obstructive sleep apnea syndrome      ICD-10-CM: G47.33  ICD-9-CM: 327.23     Essential hypertension      Blood pressure is doing well on current medications. No changes. ICD-10-CM: I10  ICD-9-CM: 401.9                  Transcribed from ambient dictation for Brent Botello MD by Lilia Fleming.  03/01/22   21:40 EST    Patient verbalized consent to the visit recording.  I have personally performed the services described in this document as transcribed by the above individual, and it is both accurate and complete.  Brent Botello MD  3/2/2022  09:05 EST      Brent Botello MD

## 2022-03-02 LAB
ALBUMIN SERPL-MCNC: 4.4 G/DL (ref 3.8–4.8)
ALBUMIN/GLOB SERPL: 2.1 {RATIO} (ref 1.2–2.2)
ALP SERPL-CCNC: 84 IU/L (ref 44–121)
ALT SERPL-CCNC: 32 IU/L (ref 0–44)
AST SERPL-CCNC: 29 IU/L (ref 0–40)
BILIRUB SERPL-MCNC: 0.5 MG/DL (ref 0–1.2)
BUN SERPL-MCNC: 15 MG/DL (ref 8–27)
BUN/CREAT SERPL: 12 (ref 10–24)
CALCIUM SERPL-MCNC: 9.5 MG/DL (ref 8.6–10.2)
CHLORIDE SERPL-SCNC: 96 MMOL/L (ref 96–106)
CO2 SERPL-SCNC: 25 MMOL/L (ref 20–29)
CREAT SERPL-MCNC: 1.28 MG/DL (ref 0.76–1.27)
EGFR GENE MUT ANL BLD/T: 61 ML/MIN/1.73
GLOBULIN SER CALC-MCNC: 2.1 G/DL (ref 1.5–4.5)
GLUCOSE SERPL-MCNC: 88 MG/DL (ref 65–99)
MAGNESIUM SERPL-MCNC: 2.1 MG/DL (ref 1.6–2.3)
POTASSIUM SERPL-SCNC: 4.6 MMOL/L (ref 3.5–5.2)
PROT SERPL-MCNC: 6.5 G/DL (ref 6–8.5)
SODIUM SERPL-SCNC: 137 MMOL/L (ref 134–144)

## 2022-03-09 ENCOUNTER — READMISSION MANAGEMENT (OUTPATIENT)
Dept: CALL CENTER | Facility: HOSPITAL | Age: 67
End: 2022-03-09

## 2022-03-09 NOTE — OUTREACH NOTE
Medical Week 3 Survey    Flowsheet Row Responses   [facility] patient discharged from? [place]   Does the patient have one of the following disease processes/diagnoses(primary or secondary)? Other   Week 3 attempt successful? No   Unsuccessful attempts Attempt 1          RUBÉN SMITH - Registered Nurse

## 2022-03-14 ENCOUNTER — READMISSION MANAGEMENT (OUTPATIENT)
Dept: CALL CENTER | Facility: HOSPITAL | Age: 67
End: 2022-03-14

## 2022-03-14 NOTE — OUTREACH NOTE
Medical Week 3 Survey    Flowsheet Row Responses   [facility] patient discharged from? [place]   Does the patient have one of the following disease processes/diagnoses(primary or secondary)? Other   Week 3 attempt successful? Yes   Call start time 1641   Call end time 1646   Discharge diagnosis Syncope and collapse-left heart cath this visit   Is patient permission given to speak with other caregiver? Yes   Person spoke with today (if not patient) and relationship Martine(, Spouse)   Meds reviewed with patient/caregiver? Yes   Is the patient having any side effects they believe may be caused by any medication additions or changes? No   Does the patient have all medications ordered at discharge? Yes   Does the patient have a primary care provider?  Yes   Does the patient have an appointment with their PCP within 7 days of discharge? Yes   Has the patient kept scheduled appointments due by today? Yes   Has home health visited the patient within 72 hours of discharge? N/A   Psychosocial issues? No   Did the patient receive a copy of their discharge instructions? Yes   Nursing interventions Reviewed instructions with patient   What is the patient's perception of their health status since discharge? Improving   Is the patient/caregiver able to teach back signs and symptoms related to disease process for when to call PCP? Yes   Is the patient/caregiver able to teach back signs and symptoms related to disease process for when to call 911? Yes   Is the patient/caregiver able to teach back the hierarchy of who to call/visit for symptoms/problems? PCP, Specialist, Home health nurse, Urgent Care, ED, 911 Yes   If the patient is a current smoker, are they able to teach back resources for cessation? Smoking cessation medications   Additional teach back comments Wife says he is trying hard to not smoke.No syncope episodes, wellbutrin has begun.   Week 3 Call Completed? Yes   Wrap up additional comments Improving. Will  discuss sleep study with Dr. Fredrick HERNANDEZ E - Registered Nurse

## 2022-03-24 ENCOUNTER — READMISSION MANAGEMENT (OUTPATIENT)
Dept: CALL CENTER | Facility: HOSPITAL | Age: 67
End: 2022-03-24

## 2022-03-24 ENCOUNTER — OFFICE VISIT (OUTPATIENT)
Dept: CARDIOLOGY | Facility: CLINIC | Age: 67
End: 2022-03-24

## 2022-03-24 VITALS
SYSTOLIC BLOOD PRESSURE: 90 MMHG | OXYGEN SATURATION: 96 % | HEIGHT: 74 IN | WEIGHT: 234 LBS | BODY MASS INDEX: 30.03 KG/M2 | DIASTOLIC BLOOD PRESSURE: 68 MMHG | HEART RATE: 53 BPM

## 2022-03-24 DIAGNOSIS — R55 SYNCOPE AND COLLAPSE: ICD-10-CM

## 2022-03-24 DIAGNOSIS — F17.200 SMOKER: ICD-10-CM

## 2022-03-24 DIAGNOSIS — I44.7 LBBB (LEFT BUNDLE BRANCH BLOCK): ICD-10-CM

## 2022-03-24 DIAGNOSIS — I35.0 NONRHEUMATIC AORTIC VALVE STENOSIS: Primary | ICD-10-CM

## 2022-03-24 PROCEDURE — 99214 OFFICE O/P EST MOD 30 MIN: CPT | Performed by: INTERNAL MEDICINE

## 2022-03-24 RX ORDER — VITAMIN E 268 MG
400 CAPSULE ORAL DAILY
COMMUNITY
End: 2022-05-26

## 2022-03-24 RX ORDER — MULTIPLE VITAMINS W/ MINERALS TAB 9MG-400MCG
1 TAB ORAL DAILY
COMMUNITY
End: 2022-05-26

## 2022-03-24 RX ORDER — MELATONIN
1000 DAILY
COMMUNITY
End: 2022-05-26

## 2022-03-24 RX ORDER — LANOLIN ALCOHOL/MO/W.PET/CERES
1000 CREAM (GRAM) TOPICAL DAILY
COMMUNITY

## 2022-03-24 NOTE — PROGRESS NOTES
[facility] Cardiology  Office Progress Note  Andrae Garza Jr.  1955  [address] [facility] [address]       Visit Date: 03/24/22    PCP: Brent Botello  [address]  [facility] [id number]    IDENTIFICATION: A 66 y.o. male retired  from [employer]    PROBLEM LIST:     Problem List:  1. Syncope and collapse  a. 9/21 Syncopal episode felt to be due to orthostatic hypotension  with negative EEG-data deficit  b.  2/15/2022 [facility] ED via EMS, CPR initiated at home prior to EMS arrival, patient combative initially but back to baseline [facility] ED without apparent EMS findings of arrhythmia/hypotension                          2/22 LHC nl cors EF 50%  c. 2/22 echo EF 50%, nl AVR fxn  d. 2/22 [employer] loop recorder implanted - Dr Mclean  2. Bicuspid aortic valve   a. Status post bioprosthetic aortic valve replacement January 2008 at [facility]-data deficit                          3. LBBB  4. Hypertension  5. Hyperlipidemia  6. Obstructive sleep apnea, noncompliant with CPAP  7. Current tobacco use; <1 pack/day x 40 years, failed chantix, on wellbutrin     8. History of renal cell carcinoma, status post nephrectomy Dr Lomeli  9. Surgical history:  a. Cholecystectomy  b. AVR  c. Nephrectomy         CC:   Chief Complaint   Patient presents with   • Hospital Follow Up Visit       Allergies  Allergies   Allergen Reactions   • Norvasc [Amlodipine Besylate] Cough       Current Medications    Current Outpatient Medications:   •  buPROPion SR (Wellbutrin SR) 150 MG 12 hr tablet, One po daily for 1 week then one po BID, Disp: 60 tablet, Rfl: 5  •  cholecalciferol (VITAMIN D3) 25 MCG (1000 UT) tablet, Take 1,000 Units by mouth Daily., Disp: , Rfl:   •  fexofenadine (ALLEGRA) 60 MG tablet, Take 60 mg by mouth Daily., Disp: , Rfl:   •  fluticasone (FLONASE) 50 MCG/ACT nasal spray, 2 sprays into the nostril(s) as directed by provider Daily., Disp: , Rfl:   •  hydroCHLOROthiazide (HYDRODIURIL)  "25 MG tablet, Take 1 tablet by mouth Daily., Disp: 90 tablet, Rfl: 1  •  magnesium oxide (MAG-OX) 400 MG tablet, Take 1 tablet by mouth Daily., Disp: 90 tablet, Rfl: 2  •  multivitamin with minerals (CENTRUM ADULTS PO), Take 1 tablet by mouth Daily., Disp: , Rfl:   •  nadolol (CORGARD) 40 MG tablet, Take 1 tablet by mouth Daily., Disp: 90 tablet, Rfl: 1  •  potassium chloride (MICRO-K) 10 MEQ CR capsule, Take 2 capsules by mouth Daily., Disp: 180 capsule, Rfl: 2  •  tamsulosin (Flomax) 0.4 MG capsule 24 hr capsule, Take 1 capsule by mouth Daily., Disp: 90 capsule, Rfl: 1  •  vitamin B-12 (CYANOCOBALAMIN) 1000 MCG tablet, Take 1,000 mcg by mouth Daily., Disp: , Rfl:   •  vitamin E 400 UNIT capsule, Take 400 Units by mouth Daily., Disp: , Rfl:   •  nicotine (NICODERM CQ) 21 MG/24HR patch, Place 1 patch on the skin as directed by provider Daily., Disp: 60 patch, Rfl: 0  •  Specialty Vitamins Products (PROSTATE PO), Take  by mouth. Ideal prostate (prostate and bladder health formula), Disp: , Rfl:       History of Present Illness   Andrae LAMBERT Kayla Ca is a 66 y.o. year old male here for follow up.  Patient hospital follow-up.  Seen Dr. Gallardo while hospitalized.  He is syncopal episode second in 3 months.  He had undergone cardiac evaluation had loop recorder implanted while hospitalized.  He states he has had no issues since being home.  He is not driving at current and is becoming frustrated with his quality of life.      OBJECTIVE:  Vitals:    03/24/22 1531   BP: 90/68   BP Location: Right arm   Patient Position: Sitting   Pulse: 53   SpO2: 96%   Weight: 106 kg (234 lb)   Height: 188 cm (74\")     Body mass index is 30.04 kg/m².    Constitutional:       Appearance: Healthy appearance. Not in distress.   Neck:      Vascular: No JVR. JVD normal.   Pulmonary:      Effort: Pulmonary effort is normal.      Breath sounds: Normal breath sounds. No wheezing. No rhonchi. No rales.   Chest:      Chest wall: Not tender to " palpatation.   Cardiovascular:      PMI at left midclavicular line. Normal rate. Regular rhythm. Normal S1. Normal S2.      Murmurs: There is a systolic murmur.      No gallop. No click. No rub.   Pulses:     Intact distal pulses.   Edema:     Peripheral edema absent.   Abdominal:      General: Bowel sounds are normal.      Palpations: Abdomen is soft.      Tenderness: There is no abdominal tenderness.   Musculoskeletal: Normal range of motion.         General: No tenderness. Skin:     General: Skin is warm and dry.   Neurological:      General: No focal deficit present.      Mental Status: Alert and oriented to person, place and time.         Diagnostic Data:  Procedures      ASSESSMENT:   Diagnosis Plan   1. Nonrheumatic aortic valve stenosis     2. Smoker     3. Syncope and collapse     4. LBBB (left bundle branch block)         PLAN:  Aortic stenosis status post bioprosthetic AVR acceptable function per most recent echocardiogram    Smoker commended him on weaning to 6 cigarettes daily recommended attempts at cessation.    Syncope appears most likely vasodepressor with low heart rate and low blood pressure current I will hold HCTZ and will follow blood pressure 2 weeks.  Even his nadolol potentially weaning and/or discontinuation may be necessary.  He has historically tried alternative agent for his prostatism other than Flomax but states that this was not effective.          Gaston Alcala MD, [facility]

## 2022-03-24 NOTE — OUTREACH NOTE
Medical Week 4 Survey    Flowsheet Row Responses   [facility] patient discharged from? [place]   Does the patient have one of the following disease processes/diagnoses(primary or secondary)? Other   Week 4 attempt successful? No          CAMI ELKINS - Licensed Nurse

## 2022-04-07 ENCOUNTER — TELEPHONE (OUTPATIENT)
Dept: CARDIOLOGY | Facility: CLINIC | Age: 67
End: 2022-04-07

## 2022-04-07 NOTE — TELEPHONE ENCOUNTER
Pt called to update symptoms and BP off HCTZ (recommended at last visit, 3/24/2022 - due to syncope)    BP readings averaging:  /85 - PM 95/65 -  HR 56-60s - no SBP readings consistently higher than 130    No recurrent syncope or near syncope. Trace LE edema that resolves overnight.    Current Outpatient Medications   Medication Instructions   • buPROPion SR (Wellbutrin SR) 150 MG 12 hr tablet One po daily for 1 week then one po BID   • cholecalciferol (VITAMIN D3) 1,000 Units, Oral, Daily   • fexofenadine (ALLEGRA) 60 mg, Oral, Daily   • fluticasone (FLONASE) 50 MCG/ACT nasal spray 2 sprays, Nasal, Daily   • magnesium oxide (MAG-OX) 400 mg, Oral, Daily   • multivitamin with minerals (CENTRUM ADULTS PO) 1 tablet, Oral, Daily   • nadolol (CORGARD) 40 mg, Oral, Daily   • potassium chloride (MICRO-K) 10 MEQ CR capsule 20 mEq, Oral, Daily   • tamsulosin (FLOMAX) 0.4 mg, Oral, Daily   • vitamin B-12 (CYANOCOBALAMIN) 1,000 mcg, Oral, Daily   • vitamin E 400 Units, Oral, Daily      Lab Results   Component Value Date    GLUCOSE 88 03/01/2022    BUN 15 03/01/2022    CREATININE 1.28 (H) 03/01/2022    EGFRIFNONA 58 (L) 02/17/2022    EGFRIFAFRI 67 08/30/2021    BCR 12 03/01/2022    K 4.6 03/01/2022    CO2 25 03/01/2022    CALCIUM 9.5 03/01/2022    PROTENTOTREF 6.5 03/01/2022    ALBUMIN 4.4 03/01/2022    LABIL2 2.1 03/01/2022    AST 29 03/01/2022    ALT 32 03/01/2022      Will review with [facility] -   Per [facility] - continue off HCTZ. Call should symptoms recur or further concerns need to be addressed.

## 2022-04-10 PROCEDURE — G2066 INTER DEVC REMOTE 30D: HCPCS | Performed by: INTERNAL MEDICINE

## 2022-04-10 PROCEDURE — 93298 REM INTERROG DEV EVAL SCRMS: CPT | Performed by: INTERNAL MEDICINE

## 2022-04-29 ENCOUNTER — TELEPHONE (OUTPATIENT)
Dept: CARDIOLOGY | Facility: CLINIC | Age: 67
End: 2022-04-29

## 2022-05-04 RX ORDER — LOSARTAN POTASSIUM 50 MG/1
50 TABLET ORAL DAILY
Qty: 30 TABLET | Refills: 11 | Status: SHIPPED | OUTPATIENT
Start: 2022-05-04 | End: 2022-08-30 | Stop reason: SDUPTHER

## 2022-05-11 PROCEDURE — 93298 REM INTERROG DEV EVAL SCRMS: CPT | Performed by: INTERNAL MEDICINE

## 2022-05-11 PROCEDURE — G2066 INTER DEVC REMOTE 30D: HCPCS | Performed by: INTERNAL MEDICINE

## 2022-05-23 ENCOUNTER — TELEPHONE (OUTPATIENT)
Dept: CARDIOLOGY | Facility: CLINIC | Age: 67
End: 2022-05-23

## 2022-05-23 NOTE — TELEPHONE ENCOUNTER
Since initiation of losartan 50 mg daily on 5/2  BP averaging ~130 systolic, some orthostatic changes noted.  128/90  113/91  130/94    148/91  139/85    133/94  109/89 - st  121/79  110/74    143/97  135/94  155/93  130/92    He will continue current medications and bring copy of BP readings to clinic in [facility] on Thursday 5/26/2022.

## 2022-05-26 ENCOUNTER — OFFICE VISIT (OUTPATIENT)
Dept: FAMILY MEDICINE CLINIC | Facility: CLINIC | Age: 67
End: 2022-05-26

## 2022-05-26 VITALS
SYSTOLIC BLOOD PRESSURE: 136 MMHG | TEMPERATURE: 97.1 F | OXYGEN SATURATION: 99 % | BODY MASS INDEX: 30.03 KG/M2 | HEIGHT: 74 IN | WEIGHT: 234 LBS | DIASTOLIC BLOOD PRESSURE: 86 MMHG | HEART RATE: 54 BPM | RESPIRATION RATE: 18 BRPM

## 2022-05-26 DIAGNOSIS — I10 ESSENTIAL HYPERTENSION: ICD-10-CM

## 2022-05-26 DIAGNOSIS — G89.29 CHRONIC PAIN OF LEFT KNEE: ICD-10-CM

## 2022-05-26 DIAGNOSIS — Z23 NEED FOR PNEUMOCOCCAL VACCINATION: ICD-10-CM

## 2022-05-26 DIAGNOSIS — Z00.00 MEDICARE ANNUAL WELLNESS VISIT, INITIAL: Primary | ICD-10-CM

## 2022-05-26 DIAGNOSIS — M25.562 CHRONIC PAIN OF LEFT KNEE: ICD-10-CM

## 2022-05-26 PROCEDURE — G0438 PPPS, INITIAL VISIT: HCPCS | Performed by: FAMILY MEDICINE

## 2022-05-26 PROCEDURE — G0009 ADMIN PNEUMOCOCCAL VACCINE: HCPCS | Performed by: FAMILY MEDICINE

## 2022-05-26 PROCEDURE — 1170F FXNL STATUS ASSESSED: CPT | Performed by: FAMILY MEDICINE

## 2022-05-26 PROCEDURE — 90677 PCV20 VACCINE IM: CPT | Performed by: FAMILY MEDICINE

## 2022-05-26 PROCEDURE — 99213 OFFICE O/P EST LOW 20 MIN: CPT | Performed by: FAMILY MEDICINE

## 2022-05-26 PROCEDURE — 1126F AMNT PAIN NOTED NONE PRSNT: CPT | Performed by: FAMILY MEDICINE

## 2022-05-26 PROCEDURE — 1159F MED LIST DOCD IN RCRD: CPT | Performed by: FAMILY MEDICINE

## 2022-05-26 RX ORDER — MULTIPLE VITAMINS W/ MINERALS TAB 9MG-400MCG
1 TAB ORAL DAILY
COMMUNITY

## 2022-05-26 NOTE — PROGRESS NOTES
The ABCs of the Annual Wellness Visit  Initial Medicare Wellness Visit    Chief Complaint   Patient presents with   • Medicare Wellness-Initial Visit     Subjective   History of Present Illness:  Andrae Garza Jr. is a 66 y.o. male who presents for an Initial Medicare Wellness Visit.    The patient presents today for a Medicare wellness visit.    Left knee pain  The patient reports that he has aches and pains in his left knee. He states he also has pain in the left wrist and forearm and that he does not want to lay his arm down on his lap. He denies any issues since he was in the hospital. The patient reports that he fell over on the couch in 09/2021. He states that he did not hit it on anything. The patient reports that he does not feel any lump in his knee. He states that he has not had an x-ray of his knee. The patient reports that he is going on vacation in 09/2022 and he was supposed to do a whole bunch of walking.    Hypertension  The patient reports that his blood pressure medication was changed. He states that his blood pressure is now higher. The patient reports that he dropped a copy of his blood pressure medication off to Ashley's office. He states that some days it is high, but he was told that it was 150/100 mmHg. The patient reports that when he has been sitting for quite a while and he will take his blood pressure around 2:00 or 3:00 PM he has had as low as 80/60 mmHg.     Health maintenance  The patient reports that he is not on the blood thinner. He states that he does not exercise like he does, but he is trying to be out. The patient reports that he had laser treatment on his right eye 2 weeks ago. He states that he has a dentist that he goes to. The patient reports that he did his annual eye exam last year with COVID-19. He states that he had floaters. The patient reports that he was sent back to a specialist in [place] and had a laser procedure. The patient reports that he had to go back  and see him again yesterday. He states that he will go back to see him again in 3 months.    The patient reports that he feels worse compared to 1 year ago. He states that it is from his joints. The patient reports that he feels the same mentally. He states that he is off aspirin. The patient reports that he only wants him to take Advil. He states that Tylenol seems to do more than he thought it would. The patient reports that he had an injury one time and his mother had prescription Tylenol, but it did not do a thing for him. The patient reports that he had a broken shoulder blade.    The patient reports that his weight has gone up a little bit since he tried to quit smoking. He is now smoking half a pack of cigarettes per day and has one drink per day.    Skin lesion  The patient reports that he had a mole removed on his left arm. He states that he was supposed to go back but he sees so many other doctors. The patient reports that he sees a dermatologist once a year. He states that now he is supposed to see the dermatologist or the surgeon every 3 months.  He had a malignant melanoma.    The following portions of the patient's history were reviewed and   updated as appropriate: allergies, current medications, past family history, past medical history, past social history, past surgical history and problem list.     Compared to one year ago, the patient feels his physical   health is worse.joint pain     Compared to one year ago, the patient feels his mental   health is the same.    Recent Hospitalizations:  This patient has had a [facility] [place] admission record on file within the last 365 days.    Current Medical Providers:  Patient Care Team:  Brent Botello MD as PCP - General (Family Medicine)  George Lomeli MD as Consulting Physician (Urology)  Thee Abebe MD (Cardiology)    Outpatient Medications Prior to Visit   Medication Sig Dispense Refill   • buPROPion SR (Wellbutrin SR) 150 MG 12 hr  tablet One po daily for 1 week then one po BID 60 tablet 5   • fexofenadine (ALLEGRA) 60 MG tablet Take 60 mg by mouth Daily.     • losartan (COZAAR) 50 MG tablet Take 1 tablet by mouth Daily. 30 tablet 11   • multivitamin with minerals (CENTRUM SILVER PO) Take 1 tablet by mouth Daily.     • nadolol (CORGARD) 40 MG tablet Take 1 tablet by mouth Daily. 90 tablet 1   • tamsulosin (Flomax) 0.4 MG capsule 24 hr capsule Take 1 capsule by mouth Daily. 90 capsule 1   • vitamin B-12 (CYANOCOBALAMIN) 1000 MCG tablet Take 1,000 mcg by mouth Daily.     • cholecalciferol (VITAMIN D3) 25 MCG (1000 UT) tablet Take 1,000 Units by mouth Daily.     • fluticasone (FLONASE) 50 MCG/ACT nasal spray 2 sprays into the nostril(s) as directed by provider Daily.     • magnesium oxide (MAG-OX) 400 MG tablet Take 1 tablet by mouth Daily. 90 tablet 2   • multivitamin with minerals tablet tablet Take 1 tablet by mouth Daily.     • potassium chloride (MICRO-K) 10 MEQ CR capsule Take 2 capsules by mouth Daily. 180 capsule 2   • vitamin E 400 UNIT capsule Take 400 Units by mouth Daily.       No facility-administered medications prior to visit.       No opioid medication identified on active medication list. I have reviewed chart for other potential  high risk medication/s and harmful drug interactions in the elderly.          Aspirin is not on active medication list.  Aspirin use is not indicated based on review of current medical condition/s. Risk of harm outweighs potential benefits.  .    Patient Active Problem List   Diagnosis   • Benign prostatic hyperplasia with urinary obstruction   • Atopic rhinitis   • Hyperlipidemia   • Hypertension   • Renal cell carcinoma (HCC)   • Cobalamin deficiency   • H/O aortic valve replacement with porcine valve   • Melanoma (HCC)   • Syncope and collapse     Advance Care Planning  Advance Directive is not on file.  ACP discussion was held with the patient during this visit. patient has living will    Review of  "Systems   Constitutional: Negative.    HENT: Negative.    Respiratory: Positive for shortness of breath ( tob use).    Cardiovascular: Negative.  Negative for chest pain.   Gastrointestinal: Negative.  Negative for constipation.   Genitourinary:        Increased freq, at hs, feels like inc urgency at hs at times. + hesitancy, hard to pass gas   Musculoskeletal: Positive for arthralgias.   Neurological: Negative.         Objective       Vitals:    05/26/22 0958   BP: 136/86   Pulse: 54   Resp: 18   Temp: 97.1 °F (36.2 °C)   SpO2: 99%   Weight: 106 kg (234 lb)   Height: 188 cm (74\")   PainSc: 0-No pain     Body mass index is 30.04 kg/m².  BMI is >= 30 and <= 34.9 (Class 1 obesity). The following options were offered after discussion: exercise counseling/recommendations and nutrition counseling/recommendations    Does the patient have evidence of cognitive impairment? No, not as good as in the past.     Physical Exam  Vitals and nursing note reviewed.   Constitutional:       General: He is not in acute distress.     Appearance: Normal appearance. He is well-developed. He is not ill-appearing.   HENT:      Head: Normocephalic and atraumatic.      Right Ear: Hearing, tympanic membrane, ear canal and external ear normal.      Left Ear: Hearing, tympanic membrane, ear canal and external ear normal.      Nose: Nose normal. No congestion or rhinorrhea.      Mouth/Throat:      Mouth: Mucous membranes are moist.      Pharynx: No oropharyngeal exudate or posterior oropharyngeal erythema.   Eyes:      General: Lids are normal.      Conjunctiva/sclera: Conjunctivae normal.      Pupils: Pupils are equal, round, and reactive to light.   Neck:      Thyroid: No thyromegaly.   Cardiovascular:      Rate and Rhythm: Normal rate and regular rhythm.      Heart sounds: Normal heart sounds. No murmur heard.    No friction rub.   Pulmonary:      Effort: Pulmonary effort is normal. No respiratory distress.      Breath sounds: Normal breath " sounds. No wheezing or rales.   Abdominal:      General: Bowel sounds are normal. There is no distension.      Palpations: Abdomen is soft. There is no mass.      Tenderness: There is no abdominal tenderness. There is no guarding or rebound.   Musculoskeletal:      Right lower leg: No edema.      Left lower leg: No edema.   Skin:     General: Skin is warm and dry.      Comments: Bruising forearms,    Neurological:      General: No focal deficit present.      Mental Status: He is alert.   Psychiatric:         Mood and Affect: Mood normal.         Speech: Speech normal.         Behavior: Behavior normal.               HEALTH RISK ASSESSMENT    Smoking Status:  Social History     Tobacco Use   Smoking Status Current Every Day Smoker   • Packs/day: 0.50   • Years: 40.00   • Pack years: 20.00   • Types: Cigarettes   Smokeless Tobacco Never Used     Alcohol Consumption:  Social History     Substance and Sexual Activity   Alcohol Use Yes   • Alcohol/week: 1.0 standard drink   • Types: 1 Shots of liquor per week     Fall Risk Screen:    [facility] Fall Risk Assessment was completed, and patient is at MODERATE risk for falls. Assessment completed on:5/26/2022    Depression Screen:   PHQ-2/PHQ-9 Depression Screening 5/26/2022   Little Interest or Pleasure in Doing Things 0-->not at all   Feeling Down, Depressed or Hopeless 0-->not at all   PHQ-9: Brief Depression Severity Measure Score 0       Health Habits and Functional and Cognitive Screening:  Functional & Cognitive Status 5/26/2022   Do you have difficulty preparing food and eating? No   Do you have difficulty bathing yourself, getting dressed or grooming yourself? No   Do you have difficulty using the toilet? No   Do you have difficulty moving around from place to place? No   Do you have trouble with steps or getting out of a bed or a chair? No   Current Diet Well Balanced Diet   Dental Exam Up to date   Eye Exam Not up to date   Exercise (times per week) 0 times per week    Do you need help using the phone?  No   Are you deaf or do you have serious difficulty hearing?  No   Do you need help with transportation? No   Do you need help shopping? No   Do you need help preparing meals?  No   Do you need help with housework?  No   Do you need help with laundry? No   Do you need help taking your medications? No   Do you need help managing money? No   Do you ever drive or ride in a car without wearing a seat belt? No   Have you felt unusual stress, anger or loneliness in the last month? No   Who do you live with? Spouse   If you need help, do you have trouble finding someone available to you? No   Have you been bothered in the last four weeks by sexual problems? No   Do you have difficulty concentrating, remembering or making decisions? No       Age-appropriate Screening Schedule:  Refer to the list below for future screening recommendations based on patient's age, sex and/or medical conditions. Orders for these recommended tests are listed in the plan section. The patient has been provided with a written plan.    Health Maintenance   Topic Date Due   • TDAP/TD VACCINES (1 - Tdap) Never done   • ZOSTER VACCINE (1 of 2) 05/26/2023 (Originally 7/31/2004)   • INFLUENZA VACCINE  08/01/2022   • LIPID PANEL  02/17/2023            Assessment & Plan   CMS Preventative Services Quick Reference  Risk Factors Identified During Encounter  Cardiovascular Disease  Immunizations Discussed/Encouraged (specific Immunizations; Prevnar 20 (Pneumococcal 20-valent conjugate)  Obesity/Overweight   The above risks/problems have been discussed with the patient.  Follow up actions/plans if indicated are seen below in the Assessment/Plan Section.  Pertinent information has been shared with the patient in the After Visit Summary.    Diagnoses and all orders for this visit:    1. Medicare annual wellness visit, initial (Primary)    2. Need for pneumococcal vaccination  -     Pneumococcal Conjugate Vaccine 20-Valent  (PCV20)    3. Essential hypertension    4. Chronic pain of left knee    1. Medicare wellness visit  - Continue routine health maintenance including routine dentistry, eye exam, safety seatbelt use, exercise, and proper nutrition with weight loss. He does not wish to do an aneurysm screen at this time. He does agree to have the Prevnar 20 today. He will wait and get a COVID-19 booster in the fall.    2. Hypertension  - Blood pressure is stable, though it has been a little bit elevated at home. We will continue to monitor.    3. Chronic pain of left knee  - He does not wish to do an x-ray at this time. Follow up in 10/2022 for regular checkup and blood work.    Follow Up:  Return in about 5 months (around 10/10/2022).     An After Visit Summary and PPPS were made available to the patient.                     Transcribed from ambient dictation for Brent Botello MD by GE PARIKH.  05/26/22   12:10 EDT    Patient verbalized consent to the visit recording.  I have personally performed the services described in this document as transcribed by the above individual, and it is both accurate and complete.  Brent Botello MD  5/26/2022  17:31 EDT

## 2022-07-14 ENCOUNTER — TELEPHONE (OUTPATIENT)
Dept: CARDIOLOGY | Facility: CLINIC | Age: 67
End: 2022-07-14

## 2022-07-14 ENCOUNTER — OFFICE VISIT (OUTPATIENT)
Dept: CARDIOLOGY | Facility: CLINIC | Age: 67
End: 2022-07-14

## 2022-07-14 VITALS
DIASTOLIC BLOOD PRESSURE: 80 MMHG | BODY MASS INDEX: 30.16 KG/M2 | SYSTOLIC BLOOD PRESSURE: 118 MMHG | HEIGHT: 74 IN | OXYGEN SATURATION: 98 % | WEIGHT: 235 LBS | HEART RATE: 60 BPM

## 2022-07-14 DIAGNOSIS — I10 PRIMARY HYPERTENSION: ICD-10-CM

## 2022-07-14 DIAGNOSIS — R55 SYNCOPE AND COLLAPSE: Primary | ICD-10-CM

## 2022-07-14 DIAGNOSIS — I44.7 LBBB (LEFT BUNDLE BRANCH BLOCK): ICD-10-CM

## 2022-07-14 PROCEDURE — 99214 OFFICE O/P EST MOD 30 MIN: CPT | Performed by: INTERNAL MEDICINE

## 2022-07-14 NOTE — PROGRESS NOTES
"[facility] Cardiology  Office Progress Note  Andrae Garza Jr.  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 CAD  1. 10/21 CT chest revealing coronary calcifications.  2. Syncope and collapse  a. 9/21 Syncopal episode felt to be due to orthostatic hypotension  with negative EEG-data deficit  b.  2/15/2022 [facility] ED via EMS, CPR initiated at home prior to EMS arrival, patient combative initially but back to baseline [facility] ED without apparent EMS findings of arrhythmia/hypotension                          2/22 [facility] nl cors EF 50%  c. 2/22 echo EF 50%, nl AVR fxn  d. 2/22 [employer] scientific loop recorder implanted - Dr Mclean  2. Bicuspid aortic valve   a. Status post bioprosthetic aortic valve replacement January 2008 at [facility]-data deficit                        3. Carotid artery disease  1. 2/22 carotid US: Proximal right ICA artery normal.  Proximal left ICA artery plaque without significant stenosis.  LICA stenosis of 0-49%.  Antegrade vertebrals bilaterally.  4. LBBB  5. Hypertension  6. Hyperlipidemia  1. 2/22 154/148/38/90  7. Obstructive sleep apnea, noncompliant with CPAP-controlled with \"sleep shirt\" with tennis ball in the center  8. Current tobacco use; <1 pack/day x 40 years, failed chantix, on wellbutrin   8. History of renal cell carcinoma, status post nephrectomy Dr Lomeli  9. Surgical history:  a. Cholecystectomy  b. AVR  c. Nephrectomy      CC:   Chief Complaint   Patient presents with   • Nonrheumatic aortic valve stenosis       Allergies  Allergies   Allergen Reactions   • Norvasc [Amlodipine Besylate] Cough       Current Medications    Current Outpatient Medications:   •  buPROPion SR (Wellbutrin SR) 150 MG 12 hr tablet, One po daily for 1 week then one po BID, " "Disp: 60 tablet, Rfl: 5  •  fexofenadine (ALLEGRA) 60 MG tablet, Take 60 mg by mouth Daily., Disp: , Rfl:   •  losartan (COZAAR) 50 MG tablet, Take 1 tablet by mouth Daily., Disp: 30 tablet, Rfl: 11  •  multivitamin with minerals tablet tablet, Take 1 tablet by mouth Daily., Disp: , Rfl:   •  nadolol (CORGARD) 40 MG tablet, Take 1 tablet by mouth Daily., Disp: 90 tablet, Rfl: 1  •  tamsulosin (Flomax) 0.4 MG capsule 24 hr capsule, Take 1 capsule by mouth Daily., Disp: 90 capsule, Rfl: 1  •  vitamin B-12 (CYANOCOBALAMIN) 1000 MCG tablet, Take 1,000 mcg by mouth Daily., Disp: , Rfl:       History of Present Illness   Andrae Garza Jr. is a 66 y.o. year old male here for follow up.  Various maladies since last visit including melanoma resection and laser eye surgery.  Serendipitously he had a 9-second pause noted on his loop recorder last night.  He states that he was sound asleep and had no issues.  He does note that he on occasion drinks excessive bourbon.  He has his sleep apnea  controlled with a sleep short with a tennis ball sewn in the center        OBJECTIVE:  Vitals:    07/14/22 1442   BP: 118/80   BP Location: Left arm   Patient Position: Sitting   Pulse: 60   SpO2: 98%   Weight: 107 kg (235 lb)   Height: 188 cm (74\")     Body mass index is 30.17 kg/m².    Constitutional:       Appearance: Healthy appearance. Not in distress.   Neck:      Vascular: No JVR. JVD normal.   Pulmonary:      Effort: Pulmonary effort is normal.      Breath sounds: Normal breath sounds. No wheezing. No rhonchi. No rales.   Chest:      Chest wall: Not tender to palpatation.   Cardiovascular:      PMI at left midclavicular line. Normal rate. Regular rhythm. Normal S1. Normal S2.      Murmurs: There is a systolic murmur.      No gallop. No click. No rub.   Pulses:     Intact distal pulses.   Edema:     Peripheral edema absent.   Abdominal:      General: Bowel sounds are normal.      Palpations: Abdomen is soft.      Tenderness: There " is no abdominal tenderness.   Musculoskeletal: Normal range of motion.         General: No tenderness. Skin:     General: Skin is warm and dry.   Neurological:      General: No focal deficit present.      Mental Status: Alert and oriented to person, place and time.         Diagnostic Data:  Procedures  Funky Moves download with 9-second pause at 1 AM    ASSESSMENT:   Diagnosis Plan   1. Syncope and collapse     2. LBBB (left bundle branch block)     3. Primary hypertension         PLAN:  Syncope collapse left bundle branch block Loop recorder revealing 9-second nocturnal pause.  Patient be referred to EP for evaluation for permanent pacer    Hypertension controlled nadolol losartan          Gaston Alcala MD, FACC

## 2022-07-14 NOTE — TELEPHONE ENCOUNTER
I called to check on Mr Garza due to receiving a transmission from his loop recorder showing a 9 second episode of ventricular stand still. The episode was at 1:00 am and pt reports he was asleep and did not feel anything. He has an appointment today to see Dr Alcala in [place] and I have alerted Dr Alcala of findings.

## 2022-07-15 ENCOUNTER — TELEPHONE (OUTPATIENT)
Dept: CARDIOLOGY | Facility: CLINIC | Age: 67
End: 2022-07-15

## 2022-07-15 DIAGNOSIS — I44.2 CHB (COMPLETE HEART BLOCK): ICD-10-CM

## 2022-07-15 DIAGNOSIS — R55 SYNCOPE AND COLLAPSE: Primary | ICD-10-CM

## 2022-07-15 DIAGNOSIS — I46.9: ICD-10-CM

## 2022-07-15 NOTE — TELEPHONE ENCOUNTER
Called patient do discuss implanting pacemaker after patient has had 2 syncopal episodes prior to loop implant, one of which CPR and EMS had to be called for.  Patient now with two episodes of ventricular standstill, 9 seconds with no escapte beat and 21 seconds with 2 escape beats documented on loop.  Patient also has underlying conduction disease with LBBB.  Patient is agreeable to proceed next week, Corgard has been held until after implant and he will monitor BP

## 2022-07-19 ENCOUNTER — TELEPHONE (OUTPATIENT)
Dept: CARDIOLOGY | Facility: CLINIC | Age: 67
End: 2022-07-19

## 2022-07-19 NOTE — TELEPHONE ENCOUNTER
Pt called because he wants to wait at least another week before ppm implant and he is concerned about his BP now running 140/90s and he has also been tachycardic with avg heart rate about 106 and that he now feels like his heart is beating harder.  I did try to explain to him that much of this is his BB withdrawal and that we couldn't safely give him BB without the ppm and that he does have underlying conduction disease (LBBB) and that we can't predict when he may have another episode.  Are you comfortable with him waiting and then do you want me to increase his losartan to 100 daily?

## 2022-07-20 NOTE — TELEPHONE ENCOUNTER
Pt aware, he is going to try 50mg BID, monitor BP/HR and call me back next Tuesday or earlier if needed.  We will postpone ppm for 1-2 weeks until pt is ready.  He understands the risk and unpredictability of episodes.

## 2022-07-27 NOTE — TELEPHONE ENCOUNTER
Pt called back to report BP, in am before meds avg 140/95 HR 76, after 122/83, 83 and in evening 118/85, 90.  He is ready to schedule ppm and had no syncopal events.

## 2022-07-29 PROBLEM — I46.9: Status: ACTIVE | Noted: 2022-07-29

## 2022-07-29 PROBLEM — I44.2 CHB (COMPLETE HEART BLOCK): Status: ACTIVE | Noted: 2022-07-29

## 2022-08-03 ENCOUNTER — HOSPITAL ENCOUNTER (OUTPATIENT)
Facility: HOSPITAL | Age: 67
Setting detail: HOSPITAL OUTPATIENT SURGERY
Discharge: HOME | End: 2022-08-03
Attending: STUDENT IN AN ORGANIZED HEALTH CARE EDUCATION/TRAINING PROGRAM | Admitting: STUDENT IN AN ORGANIZED HEALTH CARE EDUCATION/TRAINING PROGRAM

## 2022-08-03 ENCOUNTER — APPOINTMENT (OUTPATIENT)
Dept: GENERAL RADIOLOGY | Facility: HOSPITAL | Age: 67
End: 2022-08-03

## 2022-08-03 VITALS
OXYGEN SATURATION: 98 % | WEIGHT: 234.79 LBS | DIASTOLIC BLOOD PRESSURE: 98 MMHG | HEART RATE: 74 BPM | BODY MASS INDEX: 31.12 KG/M2 | SYSTOLIC BLOOD PRESSURE: 158 MMHG | HEIGHT: 73 IN | RESPIRATION RATE: 15 BRPM | TEMPERATURE: 97 F

## 2022-08-03 DIAGNOSIS — I44.2 CHB (COMPLETE HEART BLOCK): ICD-10-CM

## 2022-08-03 DIAGNOSIS — I46.9: ICD-10-CM

## 2022-08-03 DIAGNOSIS — R55 SYNCOPE AND COLLAPSE: ICD-10-CM

## 2022-08-03 LAB
ANION GAP SERPL CALCULATED.3IONS-SCNC: 8 MMOL/L (ref 5–15)
BUN SERPL-MCNC: 19 MG/DL (ref 8–23)
BUN/CREAT SERPL: 15.7 (ref 7–25)
CALCIUM SPEC-SCNC: 9.2 MG/DL (ref 8.6–10.5)
CHLORIDE SERPL-SCNC: 104 MMOL/L (ref 98–107)
CO2 SERPL-SCNC: 27 MMOL/L (ref 22–29)
CREAT SERPL-MCNC: 1.21 MG/DL (ref 0.76–1.27)
DEPRECATED RDW RBC AUTO: 42.6 FL (ref 37–54)
EGFRCR SERPLBLD CKD-EPI 2021: 65.2 ML/MIN/1.73
ERYTHROCYTE [DISTWIDTH] IN BLOOD BY AUTOMATED COUNT: 12.5 % (ref 12.3–15.4)
GLUCOSE SERPL-MCNC: 94 MG/DL (ref 65–99)
HCT VFR BLD AUTO: 43.1 % (ref 37.5–51)
HGB BLD-MCNC: 14.5 G/DL (ref 13–17.7)
MCH RBC QN AUTO: 31.1 PG (ref 26.6–33)
MCHC RBC AUTO-ENTMCNC: 33.6 G/DL (ref 31.5–35.7)
MCV RBC AUTO: 92.5 FL (ref 79–97)
PLATELET # BLD AUTO: 196 10*3/MM3 (ref 140–450)
PMV BLD AUTO: 9.4 FL (ref 6–12)
POTASSIUM SERPL-SCNC: 3.9 MMOL/L (ref 3.5–5.2)
RBC # BLD AUTO: 4.66 10*6/MM3 (ref 4.14–5.8)
SODIUM SERPL-SCNC: 139 MMOL/L (ref 136–145)
WBC NRBC COR # BLD: 5.7 10*3/MM3 (ref 3.4–10.8)

## 2022-08-03 PROCEDURE — C1892 INTRO/SHEATH,FIXED,PEEL-AWAY: HCPCS | Performed by: STUDENT IN AN ORGANIZED HEALTH CARE EDUCATION/TRAINING PROGRAM

## 2022-08-03 PROCEDURE — 33286 RMVL SUBQ CAR RHYTHM MNTR: CPT | Performed by: STUDENT IN AN ORGANIZED HEALTH CARE EDUCATION/TRAINING PROGRAM

## 2022-08-03 PROCEDURE — 80048 BASIC METABOLIC PNL TOTAL CA: CPT | Performed by: STUDENT IN AN ORGANIZED HEALTH CARE EDUCATION/TRAINING PROGRAM

## 2022-08-03 PROCEDURE — 0 LIDOCAINE 1 % SOLUTION: Performed by: STUDENT IN AN ORGANIZED HEALTH CARE EDUCATION/TRAINING PROGRAM

## 2022-08-03 PROCEDURE — 33208 INSRT HEART PM ATRIAL & VENT: CPT | Performed by: STUDENT IN AN ORGANIZED HEALTH CARE EDUCATION/TRAINING PROGRAM

## 2022-08-03 PROCEDURE — C1785 PMKR, DUAL, RATE-RESP: HCPCS | Performed by: STUDENT IN AN ORGANIZED HEALTH CARE EDUCATION/TRAINING PROGRAM

## 2022-08-03 PROCEDURE — 25010000002 ONDANSETRON PER 1 MG: Performed by: STUDENT IN AN ORGANIZED HEALTH CARE EDUCATION/TRAINING PROGRAM

## 2022-08-03 PROCEDURE — C1887 CATHETER, GUIDING: HCPCS | Performed by: STUDENT IN AN ORGANIZED HEALTH CARE EDUCATION/TRAINING PROGRAM

## 2022-08-03 PROCEDURE — 99152 MOD SED SAME PHYS/QHP 5/>YRS: CPT | Performed by: STUDENT IN AN ORGANIZED HEALTH CARE EDUCATION/TRAINING PROGRAM

## 2022-08-03 PROCEDURE — C1898 LEAD, PMKR, OTHER THAN TRANS: HCPCS | Performed by: STUDENT IN AN ORGANIZED HEALTH CARE EDUCATION/TRAINING PROGRAM

## 2022-08-03 PROCEDURE — 85027 COMPLETE CBC AUTOMATED: CPT | Performed by: STUDENT IN AN ORGANIZED HEALTH CARE EDUCATION/TRAINING PROGRAM

## 2022-08-03 PROCEDURE — 25010000002 CEFAZOLIN PER 500 MG

## 2022-08-03 PROCEDURE — 0 IOPAMIDOL PER 1 ML: Performed by: STUDENT IN AN ORGANIZED HEALTH CARE EDUCATION/TRAINING PROGRAM

## 2022-08-03 PROCEDURE — 99153 MOD SED SAME PHYS/QHP EA: CPT | Performed by: STUDENT IN AN ORGANIZED HEALTH CARE EDUCATION/TRAINING PROGRAM

## 2022-08-03 PROCEDURE — C1769 GUIDE WIRE: HCPCS | Performed by: STUDENT IN AN ORGANIZED HEALTH CARE EDUCATION/TRAINING PROGRAM

## 2022-08-03 PROCEDURE — 25010000002 FENTANYL CITRATE (PF) 50 MCG/ML SOLUTION: Performed by: STUDENT IN AN ORGANIZED HEALTH CARE EDUCATION/TRAINING PROGRAM

## 2022-08-03 PROCEDURE — 25010000002 MIDAZOLAM PER 1 MG: Performed by: STUDENT IN AN ORGANIZED HEALTH CARE EDUCATION/TRAINING PROGRAM

## 2022-08-03 PROCEDURE — 71046 X-RAY EXAM CHEST 2 VIEWS: CPT

## 2022-08-03 DEVICE — LD PM CAPSUREFIX NOVUS5076 52CM: Type: IMPLANTABLE DEVICE | Status: FUNCTIONAL

## 2022-08-03 DEVICE — IMPLANTABLE DEVICE: Type: IMPLANTABLE DEVICE | Status: FUNCTIONAL

## 2022-08-03 DEVICE — GEN PM AZURE XT SURESCAN DR MRI: Type: IMPLANTABLE DEVICE | Status: FUNCTIONAL

## 2022-08-03 DEVICE — ABSORBABLE HEMOSTAT (OXIDIZED REGENERATED CELLULOSE, U.S.P.)
Type: IMPLANTABLE DEVICE | Status: FUNCTIONAL
Brand: SURGICEL

## 2022-08-03 RX ORDER — LIDOCAINE HYDROCHLORIDE 10 MG/ML
INJECTION, SOLUTION INFILTRATION; PERINEURAL AS NEEDED
Status: DISCONTINUED | OUTPATIENT
Start: 2022-08-03 | End: 2022-08-03 | Stop reason: HOSPADM

## 2022-08-03 RX ORDER — BUPIVACAINE HCL/0.9 % NACL/PF 0.1 %
2 PLASTIC BAG, INJECTION (ML) EPIDURAL ONCE
Status: COMPLETED | OUTPATIENT
Start: 2022-08-03 | End: 2022-08-03

## 2022-08-03 RX ORDER — FENTANYL CITRATE 50 UG/ML
INJECTION, SOLUTION INTRAMUSCULAR; INTRAVENOUS AS NEEDED
Status: DISCONTINUED | OUTPATIENT
Start: 2022-08-03 | End: 2022-08-03 | Stop reason: HOSPADM

## 2022-08-03 RX ORDER — ACETAMINOPHEN 325 MG/1
650 TABLET ORAL EVERY 4 HOURS PRN
Status: DISCONTINUED | OUTPATIENT
Start: 2022-08-03 | End: 2022-08-03 | Stop reason: HOSPADM

## 2022-08-03 RX ORDER — ONDANSETRON 2 MG/ML
INJECTION INTRAMUSCULAR; INTRAVENOUS AS NEEDED
Status: DISCONTINUED | OUTPATIENT
Start: 2022-08-03 | End: 2022-08-03 | Stop reason: HOSPADM

## 2022-08-03 RX ORDER — BUPIVACAINE HCL/0.9 % NACL/PF 0.1 %
2 PLASTIC BAG, INJECTION (ML) EPIDURAL EVERY 8 HOURS
Status: DISCONTINUED | OUTPATIENT
Start: 2022-08-04 | End: 2022-08-03

## 2022-08-03 RX ORDER — SODIUM CHLORIDE 0.9 % (FLUSH) 0.9 %
10 SYRINGE (ML) INJECTION AS NEEDED
Status: DISCONTINUED | OUTPATIENT
Start: 2022-08-03 | End: 2022-08-03 | Stop reason: HOSPADM

## 2022-08-03 RX ORDER — MIDAZOLAM HYDROCHLORIDE 1 MG/ML
INJECTION INTRAMUSCULAR; INTRAVENOUS AS NEEDED
Status: DISCONTINUED | OUTPATIENT
Start: 2022-08-03 | End: 2022-08-03 | Stop reason: HOSPADM

## 2022-08-03 RX ORDER — BUPIVACAINE HYDROCHLORIDE 5 MG/ML
INJECTION, SOLUTION EPIDURAL; INTRACAUDAL AS NEEDED
Status: DISCONTINUED | OUTPATIENT
Start: 2022-08-03 | End: 2022-08-03 | Stop reason: HOSPADM

## 2022-08-03 RX ORDER — SODIUM CHLORIDE 9 MG/ML
INJECTION, SOLUTION INTRAVENOUS CONTINUOUS PRN
Status: COMPLETED | OUTPATIENT
Start: 2022-08-03 | End: 2022-08-03

## 2022-08-03 RX ORDER — ACETAMINOPHEN 650 MG/1
650 SUPPOSITORY RECTAL EVERY 4 HOURS PRN
Status: DISCONTINUED | OUTPATIENT
Start: 2022-08-03 | End: 2022-08-03 | Stop reason: HOSPADM

## 2022-08-03 RX ORDER — SODIUM CHLORIDE 0.9 % (FLUSH) 0.9 %
3 SYRINGE (ML) INJECTION EVERY 12 HOURS SCHEDULED
Status: DISCONTINUED | OUTPATIENT
Start: 2022-08-03 | End: 2022-08-03 | Stop reason: HOSPADM

## 2022-08-03 RX ADMIN — Medication 2 G: at 16:28

## 2022-08-03 NOTE — INTERVAL H&P NOTE
H&P updated. The patient was examined and the following changes are noted:      Andrae Garza Jr. is a 67-year-old male patient who presents today for pacemaker insertion.  He was last seen by Dr. Alcala in the office on 7/14/2022.  He was referred to EP for pacemaker evaluation and insertion after his loop recorder revealed multiple pauses over the last several months lasting 9 seconds and greater along with syncopal episodes.  Since his office visit he denies hospitalizations, ED visits, urgent care visits, infections or open wounds, strokelike symptoms.  No changes are noted to his physical exam.  Today he is in normal sinus rhythm with left bundle branch block and occasional PVCs.  He reports that his blood pressure medications have been recently changed and are still being titrated.  Wife is at bedside today.     Electronically signed by LUIS Benavides, 08/03/22, 1:18 PM EDT.

## 2022-08-04 ENCOUNTER — CALL CENTER PROGRAMS (OUTPATIENT)
Dept: CALL CENTER | Facility: HOSPITAL | Age: 67
End: 2022-08-04

## 2022-08-04 ENCOUNTER — PREP FOR SURGERY (OUTPATIENT)
Dept: OTHER | Facility: HOSPITAL | Age: 67
End: 2022-08-04

## 2022-08-04 DIAGNOSIS — I44.2 CHB (COMPLETE HEART BLOCK): Primary | ICD-10-CM

## 2022-08-04 RX ORDER — BUPIVACAINE HCL/0.9 % NACL/PF 0.1 %
2 PLASTIC BAG, INJECTION (ML) EPIDURAL
Status: CANCELLED | OUTPATIENT
Start: 2022-08-04

## 2022-08-04 RX ORDER — ACETAMINOPHEN 325 MG/1
650 TABLET ORAL EVERY 4 HOURS PRN
Status: CANCELLED | OUTPATIENT
Start: 2022-08-04

## 2022-08-04 RX ORDER — SODIUM CHLORIDE 0.9 % (FLUSH) 0.9 %
10 SYRINGE (ML) INJECTION AS NEEDED
Status: CANCELLED | OUTPATIENT
Start: 2022-08-04

## 2022-08-04 RX ORDER — NITROGLYCERIN 0.4 MG/1
0.4 TABLET SUBLINGUAL
Status: CANCELLED | OUTPATIENT
Start: 2022-08-04

## 2022-08-04 RX ORDER — SODIUM CHLORIDE 0.9 % (FLUSH) 0.9 %
3 SYRINGE (ML) INJECTION EVERY 12 HOURS SCHEDULED
Status: CANCELLED | OUTPATIENT
Start: 2022-08-04

## 2022-08-04 RX ORDER — ONDANSETRON 2 MG/ML
4 INJECTION INTRAMUSCULAR; INTRAVENOUS EVERY 6 HOURS PRN
Status: CANCELLED | OUTPATIENT
Start: 2022-08-04

## 2022-08-04 NOTE — OUTREACH NOTE
PCI/Device Survey    Flowsheet Row Responses   Facility patient discharged from? [facility]   Procedure date 08/03/22   Procedure (if device, specify in description) Device   Device Description Leadless ppm (MDT) vs. DDD PPM Implant   Performing MD Other (annotate)  [Dr Navarro]   Attempt successful? Yes   Call start time 1333   Call end time 1341   Person spoke with today (if not patient) and relationship patient   Has the patient had any of the following symptoms since discharge? --  [Soreness at site]   Is the patient taking prescribed medications: None   Nursing intervention Reminded to continue to take prescribed medications   Does the patient have any of the following symptoms related to the cath/surgical site? --  [Dressing intact, wearing sling. ]   Does the patient have an appointment scheduled with the cardiologist? Yes   Appointment comments Wound Check Wednesday Aug 10, 2022 3:30 PM, Jerad Navarro MD Tuesday Nov 15, 2022 3:30 PM   If the patient is a current smoker, are they able to teach back resources for cessation? --  [current smoker]   Did the patient feel prepared to go home on the same day as the procedure? Yes   Is the patient satisfied with the same day discharge process? Yes  [Satisfied with discharge but complaints of long day after being told to come in at 1 and had to wait so long until procedure. Went all day without eating. ]   PCI/Device call completed Yes          JACOBY ZARATE - Registered Nurse

## 2022-08-10 ENCOUNTER — OFFICE VISIT (OUTPATIENT)
Dept: CARDIOLOGY | Facility: CLINIC | Age: 67
End: 2022-08-10

## 2022-08-10 DIAGNOSIS — R55 SYNCOPE AND COLLAPSE: Primary | ICD-10-CM

## 2022-08-10 PROCEDURE — 99024 POSTOP FOLLOW-UP VISIT: CPT | Performed by: STUDENT IN AN ORGANIZED HEALTH CARE EDUCATION/TRAINING PROGRAM

## 2022-08-10 NOTE — PROGRESS NOTES
08/10/2022    Andrae Garza Jr., : 1955    WOUND CHECK    B/P: 124/84 left room (Sitting)     Pulse: 89    Patient has fever: [] Temperature if indicated:     Wound Location: left infraclavicular    Dressing Removed [x]        Old Dressing Appearance:  Clean, dry [x]                 Old, bloody drainage []                            Moist, serous drainage []                Moist, thick yellow/green drainage []       Wound Appearance: Redness []                  Drainage []                  Culture obtained []        Color: Clear     Consistency: none     Amount: none         Gloves used, wound cleansed with sterile 4x4 and peroxide [x]       MD notified [] MD orders:      Antibiotic started []  If checked, type   Other:     Appointment for follow-up scheduled for 3 months post procedure [x]    Future Appointments   Date Time Provider Department Center   10/27/2022  8:30 AM Brent Botello MD [facility]   11/15/2022  3:30 PM Jerad Navarro MD [facility]   2023  9:00 AM Brent Botello MD [facility]           Elizabeth Darling MA, 08/10/22      MD Signature:______________________________ Completed By/Date:

## 2022-08-20 ENCOUNTER — HOSPITAL ENCOUNTER (INPATIENT)
Facility: HOSPITAL | Age: 67
LOS: 3 days | Discharge: HOME | End: 2022-08-23
Attending: EMERGENCY MEDICINE | Admitting: STUDENT IN AN ORGANIZED HEALTH CARE EDUCATION/TRAINING PROGRAM

## 2022-08-20 ENCOUNTER — APPOINTMENT (OUTPATIENT)
Dept: GENERAL RADIOLOGY | Facility: HOSPITAL | Age: 67
End: 2022-08-20

## 2022-08-20 DIAGNOSIS — Z95.0 HISTORY OF PACEMAKER: ICD-10-CM

## 2022-08-20 DIAGNOSIS — T82.897A: Primary | ICD-10-CM

## 2022-08-20 PROBLEM — I44.2 COMPLETE HEART BLOCK: Status: ACTIVE | Noted: 2022-08-20

## 2022-08-20 LAB
ALBUMIN SERPL-MCNC: 3.8 G/DL (ref 3.5–5.2)
ALBUMIN/GLOB SERPL: 1.7 G/DL
ALP SERPL-CCNC: 79 U/L (ref 39–117)
ALT SERPL W P-5'-P-CCNC: 21 U/L (ref 1–41)
ANION GAP SERPL CALCULATED.3IONS-SCNC: 9 MMOL/L (ref 5–15)
AST SERPL-CCNC: 24 U/L (ref 1–40)
BASOPHILS # BLD AUTO: 0.03 10*3/MM3 (ref 0–0.2)
BASOPHILS NFR BLD AUTO: 0.5 % (ref 0–1.5)
BILIRUB SERPL-MCNC: 0.8 MG/DL (ref 0–1.2)
BUN SERPL-MCNC: 11 MG/DL (ref 8–23)
BUN/CREAT SERPL: 9.1 (ref 7–25)
CALCIUM SPEC-SCNC: 8.9 MG/DL (ref 8.6–10.5)
CHLORIDE SERPL-SCNC: 102 MMOL/L (ref 98–107)
CO2 SERPL-SCNC: 26 MMOL/L (ref 22–29)
CREAT SERPL-MCNC: 1.21 MG/DL (ref 0.76–1.27)
DEPRECATED RDW RBC AUTO: 41 FL (ref 37–54)
EGFRCR SERPLBLD CKD-EPI 2021: 65.2 ML/MIN/1.73
EOSINOPHIL # BLD AUTO: 0.17 10*3/MM3 (ref 0–0.4)
EOSINOPHIL NFR BLD AUTO: 2.7 % (ref 0.3–6.2)
ERYTHROCYTE [DISTWIDTH] IN BLOOD BY AUTOMATED COUNT: 12.4 % (ref 12.3–15.4)
GLOBULIN UR ELPH-MCNC: 2.2 GM/DL
GLUCOSE SERPL-MCNC: 94 MG/DL (ref 65–99)
HCT VFR BLD AUTO: 41 % (ref 37.5–51)
HGB BLD-MCNC: 14 G/DL (ref 13–17.7)
IMM GRANULOCYTES # BLD AUTO: 0.02 10*3/MM3 (ref 0–0.05)
IMM GRANULOCYTES NFR BLD AUTO: 0.3 % (ref 0–0.5)
LYMPHOCYTES # BLD AUTO: 1.2 10*3/MM3 (ref 0.7–3.1)
LYMPHOCYTES NFR BLD AUTO: 19.4 % (ref 19.6–45.3)
MAGNESIUM SERPL-MCNC: 1.5 MG/DL (ref 1.6–2.4)
MCH RBC QN AUTO: 31.2 PG (ref 26.6–33)
MCHC RBC AUTO-ENTMCNC: 34.1 G/DL (ref 31.5–35.7)
MCV RBC AUTO: 91.3 FL (ref 79–97)
MONOCYTES # BLD AUTO: 0.5 10*3/MM3 (ref 0.1–0.9)
MONOCYTES NFR BLD AUTO: 8.1 % (ref 5–12)
NEUTROPHILS NFR BLD AUTO: 4.27 10*3/MM3 (ref 1.7–7)
NEUTROPHILS NFR BLD AUTO: 69 % (ref 42.7–76)
NRBC BLD AUTO-RTO: 0 /100 WBC (ref 0–0.2)
PLATELET # BLD AUTO: 154 10*3/MM3 (ref 140–450)
PMV BLD AUTO: 9 FL (ref 6–12)
POTASSIUM SERPL-SCNC: 3.7 MMOL/L (ref 3.5–5.2)
PROT SERPL-MCNC: 6 G/DL (ref 6–8.5)
RBC # BLD AUTO: 4.49 10*6/MM3 (ref 4.14–5.8)
SODIUM SERPL-SCNC: 137 MMOL/L (ref 136–145)
WBC NRBC COR # BLD: 6.19 10*3/MM3 (ref 3.4–10.8)

## 2022-08-20 PROCEDURE — 71045 X-RAY EXAM CHEST 1 VIEW: CPT

## 2022-08-20 PROCEDURE — 25010000002 FENTANYL CITRATE (PF) 50 MCG/ML SOLUTION: Performed by: EMERGENCY MEDICINE

## 2022-08-20 PROCEDURE — 85025 COMPLETE CBC W/AUTO DIFF WBC: CPT | Performed by: EMERGENCY MEDICINE

## 2022-08-20 PROCEDURE — 93005 ELECTROCARDIOGRAM TRACING: CPT | Performed by: EMERGENCY MEDICINE

## 2022-08-20 PROCEDURE — 99024 POSTOP FOLLOW-UP VISIT: CPT | Performed by: INTERNAL MEDICINE

## 2022-08-20 PROCEDURE — 80053 COMPREHEN METABOLIC PANEL: CPT | Performed by: EMERGENCY MEDICINE

## 2022-08-20 PROCEDURE — 99285 EMERGENCY DEPT VISIT HI MDM: CPT

## 2022-08-20 PROCEDURE — 83735 ASSAY OF MAGNESIUM: CPT | Performed by: EMERGENCY MEDICINE

## 2022-08-20 RX ORDER — FENTANYL CITRATE 50 UG/ML
50 INJECTION, SOLUTION INTRAMUSCULAR; INTRAVENOUS ONCE
Status: COMPLETED | OUTPATIENT
Start: 2022-08-20 | End: 2022-08-20

## 2022-08-20 RX ORDER — SODIUM CHLORIDE 0.9 % (FLUSH) 0.9 %
10 SYRINGE (ML) INJECTION AS NEEDED
Status: DISCONTINUED | OUTPATIENT
Start: 2022-08-20 | End: 2022-08-23 | Stop reason: HOSPADM

## 2022-08-20 RX ORDER — HYDROCODONE BITARTRATE AND ACETAMINOPHEN 5; 325 MG/1; MG/1
1 TABLET ORAL ONCE
Status: COMPLETED | OUTPATIENT
Start: 2022-08-20 | End: 2022-08-20

## 2022-08-20 RX ORDER — LOSARTAN POTASSIUM 50 MG/1
50 TABLET ORAL 2 TIMES DAILY
Status: DISCONTINUED | OUTPATIENT
Start: 2022-08-20 | End: 2022-08-23 | Stop reason: HOSPADM

## 2022-08-20 RX ORDER — NICOTINE 21 MG/24HR
1 PATCH, TRANSDERMAL 24 HOURS TRANSDERMAL
Status: DISCONTINUED | OUTPATIENT
Start: 2022-08-20 | End: 2022-08-23 | Stop reason: HOSPADM

## 2022-08-20 RX ORDER — TAMSULOSIN HYDROCHLORIDE 0.4 MG/1
0.4 CAPSULE ORAL DAILY
Status: DISCONTINUED | OUTPATIENT
Start: 2022-08-20 | End: 2022-08-21

## 2022-08-20 RX ORDER — BUPROPION HYDROCHLORIDE 100 MG/1
100 TABLET, EXTENDED RELEASE ORAL EVERY 12 HOURS SCHEDULED
Status: DISCONTINUED | OUTPATIENT
Start: 2022-08-20 | End: 2022-08-23 | Stop reason: HOSPADM

## 2022-08-20 RX ORDER — SODIUM CHLORIDE 0.9 % (FLUSH) 0.9 %
10 SYRINGE (ML) INJECTION EVERY 12 HOURS SCHEDULED
Status: DISCONTINUED | OUTPATIENT
Start: 2022-08-20 | End: 2022-08-23 | Stop reason: HOSPADM

## 2022-08-20 RX ADMIN — Medication 10 ML: at 20:20

## 2022-08-20 RX ADMIN — LOSARTAN POTASSIUM 50 MG: 50 TABLET, FILM COATED ORAL at 20:18

## 2022-08-20 RX ADMIN — FENTANYL CITRATE 50 MCG: 50 INJECTION, SOLUTION INTRAMUSCULAR; INTRAVENOUS at 13:45

## 2022-08-20 RX ADMIN — TAMSULOSIN HYDROCHLORIDE 0.4 MG: 0.4 CAPSULE ORAL at 20:18

## 2022-08-20 RX ADMIN — Medication 1 PATCH: at 19:15

## 2022-08-20 RX ADMIN — HYDROCODONE BITARTRATE AND ACETAMINOPHEN 1 TABLET: 5; 325 TABLET ORAL at 12:19

## 2022-08-20 RX ADMIN — Medication 400 MG: at 13:47

## 2022-08-20 RX ADMIN — BUPROPION HYDROCHLORIDE 100 MG: 100 TABLET, FILM COATED, EXTENDED RELEASE ORAL at 20:18

## 2022-08-20 NOTE — H&P
"[facility] Consult/H&P Note      Referring Provider: No ref. provider found  Primary Provider:  Brent Botello MD  Reason for Consultation: diaphragmatic stimulation    PROBLEM LIST:  PROBLEM LIST:   1. CAD  1. 10/21 CT chest revealing coronary calcifications.  2. Syncope and collapse  a. 9/21 Syncopal episode felt to be due to orthostatic hypotension  with negative EEG-data deficit  b. [facility] 2/2022: nl cors EF 50%  c. 2/22 echo EF 50%, nl AVR fxn  d. 2/22 [employer] scientific loop recorder implanted - Dr Vinay gregg 9 second pause noted on loop recorder interrogation, 7/14/22.  f. S/p Loop removal, 8/3/22.  g. S/p MDT DDD PPM implant, W1  01 model, RNB [id number] serial number, JWS, 8/3/22.   h. Patient presented to ED with complaints of diaphragmatic stimulation, 8/20/22.   2. Bicuspid aortic valve   a. Status post bioprosthetic aortic valve replacement January 2008 at [facility]-data deficit      b. Echo 2/15/22: bioprosthetic aortic valve present with acceptable hemodynamics and leaflet excursion with suboptimal visualization, moderate dilation of the aortic root, LV borderline dilated, EF 46-50%, RVSP < 35 mmHg. Grade I diastolic dysfunction. Moderately reduced RV systolic function. Abnormal septal motion consistent with BBB.                     3. Carotid artery disease  1. 2/22 carotid US: Proximal right ICA artery normal.  Proximal left ICA artery plaque without significant stenosis.  LICA stenosis of 0-49%.  Antegrade vertebrals bilaterally.  4. LBBB  5. Hypertension  6. Hyperlipidemia  1. 2/22 154/148/38/90  7. Obstructive sleep apnea, noncompliant with CPAP-controlled with \"sleep shirt\" with tennis ball in the center  8. Current tobacco use  8. History of renal cell carcinoma, status post nephrectomy Dr Lomeli  9. Surgical history:  a. Cholecystectomy  b. AVR  c. Nephrectomy      HISTORY OF PRESENT ILLNESS:  Andrae LAMBERT Kayla  is a 67 y.o. male with the above noted pmhx who presented with complaints of pain " in right upper quadrant/right lower lobe. His pacemaker was interrogated and reprogrammed to VVIR 40 bpm but below threshold. Cardiology has been asked to evaluate patient. He reports that he did well after initial implant. Yesterday he did some yard work but states that he did not life more than 8lbs and tried to shift weight to right arm. He has been sleeping without recommended sling. He reports putting his left arm across his abdomen and holing it with his right arm. He reports that his BP has been 135/ prior to taking his BP meds. He reports that his BP comes down after he takes his medications.     REVIEW OF SYSTEMS  Pertinent positives are listed in the HPI and all other ROS are negative.      SOCIAL HISTORY:   reports that he has been smoking cigarettes. He has a 20.00 pack-year smoking history. He has never used smokeless tobacco. He reports current alcohol use of about 1.0 standard drink of alcohol per week. He reports that he does not use drugs.     FAMILY HISTORY:  Family history is unknown by patient.     CURRENT MEDICATIONS:      Current Facility-Administered Medications:   •  magnesium oxide (MAG-OX) tablet 400 mg, 400 mg, Oral, Daily, CastilloJuvenal mcghee MD, 400 mg at 08/20/22 1347    Current Outpatient Medications:   •  buPROPion SR (Wellbutrin SR) 150 MG 12 hr tablet, One po daily for 1 week then one po BID, Disp: 60 tablet, Rfl: 5  •  fexofenadine (ALLEGRA) 60 MG tablet, Take 180 mg by mouth Daily., Disp: , Rfl:   •  losartan (COZAAR) 50 MG tablet, Take 1 tablet by mouth Daily. (Patient taking differently: Take 50 mg by mouth 2 (Two) Times a Day.), Disp: 30 tablet, Rfl: 11  •  multivitamin with minerals tablet tablet, Take 1 tablet by mouth Daily., Disp: , Rfl:   •  tamsulosin (Flomax) 0.4 MG capsule 24 hr capsule, Take 1 capsule by mouth Daily., Disp: 90 capsule, Rfl: 1  •  vitamin B-12 (CYANOCOBALAMIN) 1000 MCG tablet, Take 1,000 mcg by mouth Daily., Disp: , Rfl:      Allergies:  Norvasc  "[amlodipine besylate] and Lisinopril    Objective     Vital Sign Min/Max for last 24 hours  Temp  Min: 98.4 °F (36.9 °C)  Max: 98.4 °F (36.9 °C)   BP  Min: 121/98  Max: 146/102   Pulse  Min: 70  Max: 85   Resp  Min: 18  Max: 19   SpO2  Min: 94 %  Max: 98 %   No data recorded   Weight  Min: 107 kg (235 lb)  Max: 107 kg (235 lb)     Flowsheet Rows    Flowsheet Row First Filed Value   Admission Height 188 cm (74\") Documented at 08/20/2022 1149   Admission Weight 107 kg (235 lb) Documented at 08/20/2022 1149          PHYSICAL EXAM:  GENERAL: This is a well-developed, well-nourished, male who is in no acute distress.   SKIN: Pink and warm without rash or abnormality noted. Pacemaker site- edges are well approximated, slight erythema.   HEENT: Head is normocephalic and atraumatic. Pupils are equal and reactive to light bilaterally. Mucous membranes are pink and moist.   NECK: Supple without lymphadenopathy or thyromegaly. There is no jugular venous distention at 30°.  LUNGS: Clear to auscultation bilaterally without wheezing, rhonchi, or rales noted.   CARDIOVASCULAR: The heart has a regular rate with a normal S1 and S2. There is no murmur, gallop, rub, or click appreciated. The PMI is nondisplaced. Carotid upstrokes are 2+ and symmetrical without bruits.   ABDOMEN: Soft and nondistended with positive bowel sounds x4. The patient denies tenderness of palpitation.   MUSCULOSKELETAL: There are no obvious bony abnormalities. Normal range of tenderness to palpation.   NEUROLOGICAL: Nonfocal. Alert and oriented x3.   PERIPHERAL VASCULAR: Femoral pulses are 2+ and symmetrical without bruits. Posterior tibial and dorsalis pedis pulses are 2+ and symmetrical. There is no peripheral edema.   PSYCH: Normal mood and affect.      EKG:  NSR, V paced 80 bpm.  Tele:  NSR 80 bpm    LABS:      Lab Results   Component Value Date    WBC 6.19 08/20/2022    HGB 14.0 08/20/2022    HCT 41.0 08/20/2022    MCV 91.3 08/20/2022     " 08/20/2022     Lab Results   Component Value Date    GLUCOSE 94 08/20/2022    BUN 11 08/20/2022    CREATININE 1.21 08/20/2022    EGFRIFNONA 58 (L) 02/17/2022    EGFRIFAFRI 67 08/30/2021    BCR 9.1 08/20/2022    K 3.7 08/20/2022    CO2 26.0 08/20/2022    CALCIUM 8.9 08/20/2022    PROTENTOTREF 6.5 03/01/2022    ALBUMIN 3.80 08/20/2022    LABIL2 2.1 03/01/2022    AST 24 08/20/2022    ALT 21 08/20/2022     Lab Results   Component Value Date    TROPONINT <0.010 02/16/2022     No results found for: INR, PROTIME  Lab Results   Component Value Date    CHOL 154 02/17/2022    CHLPL 156 04/01/2021    TRIG 148 02/17/2022    HDL 38 (L) 02/17/2022    LDL 90 02/17/2022        Results Review: I reviewed the patient's new clinical results.      ASSESSMENT:    1. Diaphragmatic stimulation  a. Pacemaker reprogrammed: Atrial lead has been turned off.  Ventricular lead changed to VVIR 40 bpm, below threshold (the patient will not pace)  2. History of complete heart block, cardiac standstill  a. S/p loop recorder for syncope of unknown origin  b. Noted to have pauses of > 9 seconds on interrogation, 7/14/22  c. S/p DDD PPM implant 8/3/22.  3. VHD, s/p AVR   a. Echo 2/2022: normal functioning bioprosthetic aortic valve, moderate aortic root dilation.   4. LBBB  5. HTN  6. HLD      PLAN:    1. Admit to telemetry.    2. Monitor heart rhythm and rate on telemetry.  3. Avoid all AV ying since the pacemaker was reprogrammed off  4. Resume losartan 50 mg twice daily  5. May need additional antihypertensive.  We will have to be cautious with adjustment since he has had hypotension in the recent past.   6. Lead revision early next week, hopefully Monday.      I discussed the patient's findings and my recommendations with patient.    Scribed for Angella Frost MD by SANJIV Hope, APRN. 8/20/2022  14:57 EDT    I Angella Frost MD personally performed the services described in this documentation as scribed by the above  individual in my presence, and it is both accurate and complete.    Angella Frost MD, FACC

## 2022-08-20 NOTE — ED PROVIDER NOTES
"Subjective   67-year-old male presents for evaluation of \"pacemaker problem.\"  Of note, the patient had his pacemaker placed approximately 2-1/2 weeks ago by Dr. Navarro here at our facility.  This morning around 5 AM the patient began experiencing a pulsating sensation in his right upper abdominal region that was rhythmic in nature and seem to correspond with his heartbeat.  He had never experienced anything like this before.  He felt that his pacemaker was likely the culprit for his symptoms.  He states that the sensation is extremely uncomfortable and does not seem to occur with every heartbeat but is occurring quite regularly.  He states that it feels like he is \"getting shocked\" but notes that it is not as severe.  He currently rates his pain at 8 out of 10 in severity.  He endorses compliance with all of his medications.          Review of Systems   Gastrointestinal: Positive for abdominal pain.   All other systems reviewed and are negative.      Past Medical History:   Diagnosis Date   • Hyperlipidemia    • Renal cell adenoma of right kidney 2007       Allergies   Allergen Reactions   • Norvasc [Amlodipine Besylate] Cough   • Lisinopril Cough       Past Surgical History:   Procedure Laterality Date   • CARDIAC CATHETERIZATION N/A 02/16/2022    Procedure: Left Heart Cath;  Surgeon: Solomon Mclean MD;  Location:  [facility];  Service: Cardiovascular;  Laterality: N/A;   • CARDIAC ELECTROPHYSIOLOGY PROCEDURE N/A 02/16/2022    Procedure: Loop insertion;  Surgeon: Solomon Mclean MD;  Location:  [facility];  Service: Cardiovascular;  Laterality: N/A;   • CARDIAC ELECTROPHYSIOLOGY PROCEDURE N/A 8/3/2022    Procedure: Leadless ppm (MDT) vs. 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 "   • TISSUE AORTIC VALVE REPLACEMENT  01/25/2008    Pig Valve       Family History   Family history unknown: Yes       Social History     Socioeconomic History   • Marital status:    Tobacco Use   • Smoking status: Current Every Day Smoker     Packs/day: 0.50     Years: 40.00     Pack years: 20.00     Types: Cigarettes   • Smokeless tobacco: Never Used   Vaping Use   • Vaping Use: Never used   Substance and Sexual Activity   • Alcohol use: Yes     Alcohol/week: 1.0 standard drink     Types: 1 Shots of liquor per week     Comment: 1 daily   • Drug use: No   • Sexual activity: Yes     Partners: Female           Objective   Physical Exam  Vitals and nursing note reviewed.   Constitutional:       Appearance: He is well-developed. He is not diaphoretic.      Comments: Uncomfortable appearing male   HENT:      Head: Normocephalic and atraumatic.   Neck:      Vascular: No JVD.   Cardiovascular:      Rate and Rhythm: Normal rate and regular rhythm.      Heart sounds: Normal heart sounds. No murmur heard.    No friction rub. No gallop.   Pulmonary:      Effort: Pulmonary effort is normal. No respiratory distress.      Breath sounds: Normal breath sounds. No wheezing or rales.   Abdominal:      General: Bowel sounds are normal. There is no distension.      Palpations: Abdomen is soft. There is no mass.      Tenderness: There is no abdominal tenderness. There is no guarding.      Comments: Palpable, rhythmic, spasms noted to musculature of right upper abdominal wall   Musculoskeletal:         General: Normal range of motion.   Skin:     General: Skin is warm and dry.      Coloration: Skin is not pale.      Findings: No erythema or rash.   Neurological:      Mental Status: He is alert and oriented to person, place, and time.   Psychiatric:         Thought Content: Thought content normal.         Judgment: Judgment normal.         Procedures           ED Course  ED Course as of 08/20/22 1629   Sat Aug 20, 2022   1221  "67-year-old male presents for evaluation of \"pacemaker problem.\"  The patient had a pacemaker placed by Dr. Navarro 2-1/2 weeks ago here at our facility.  This morning at around 5 AM the patient began experiencing a pulsating sensation in his right upper abdominal region that was rhythmic in nature and seem to correspond with a heartbeat.  He assumed that the episodes were related to his pacemaker and came to the ED to be evaluated.  On arrival, the patient is nontoxic-appearing but does appear uncomfortable.  I was able to palpate the sensation that he was describing.  We will obtain labs, imaging, EKG, and we will have the patient's device interrogated. [DD]   1222 EKG revealed a paced rhythm with a heart rate of 82 and was otherwise unrevealing. [DD]   1223 I personally viewed the patient's x-ray images myself, and I am in agreement with the radiologist's reading for final interpretation.     [DD]   1224 I spoke with Dr. Frost who recognized the patient's symptoms immediately as diaphragmatic pacing.  We will contact his device rep to come to the emergency department and change his programming to attempt to get this phenomenon to stop. [DD]   1422 The [employer] representative came to the emergency department and attempted to change the settings on the patient's device to stop his diaphragmatic pacing.  However, he was unsuccessful in attempting to do so. [DD]   1423 I notified Dr. Frost and the patient will be admitted for a revision.  He is aware/agreeable with the plan at this time. [DD]      ED Course User Index  [DD] Juvenal Castillo MD                                 Recent Results (from the past 24 hour(s))   ECG 12 Lead    Collection Time: 08/20/22 12:21 PM   Result Value Ref Range    QT Interval 450 ms    QTC Interval 525 ms   Comprehensive Metabolic Panel    Collection Time: 08/20/22 12:34 PM    Specimen: Blood   Result Value Ref Range    Glucose 94 65 - 99 mg/dL    BUN 11 8 - 23 mg/dL "    Creatinine 1.21 0.76 - 1.27 mg/dL    Sodium 137 136 - 145 mmol/L    Potassium 3.7 3.5 - 5.2 mmol/L    Chloride 102 98 - 107 mmol/L    CO2 26.0 22.0 - 29.0 mmol/L    Calcium 8.9 8.6 - 10.5 mg/dL    Total Protein 6.0 6.0 - 8.5 g/dL    Albumin 3.80 3.50 - 5.20 g/dL    ALT (SGPT) 21 1 - 41 U/L    AST (SGOT) 24 1 - 40 U/L    Alkaline Phosphatase 79 39 - 117 U/L    Total Bilirubin 0.8 0.0 - 1.2 mg/dL    Globulin 2.2 gm/dL    A/G Ratio 1.7 g/dL    BUN/Creatinine Ratio 9.1 7.0 - 25.0    Anion Gap 9.0 5.0 - 15.0 mmol/L    eGFR 65.2 >60.0 mL/min/1.73   Magnesium    Collection Time: 08/20/22 12:34 PM    Specimen: Blood   Result Value Ref Range    Magnesium 1.5 (L) 1.6 - 2.4 mg/dL   CBC Auto Differential    Collection Time: 08/20/22 12:34 PM    Specimen: Blood   Result Value Ref Range    WBC 6.19 3.40 - 10.80 10*3/mm3    RBC 4.49 4.14 - 5.80 10*6/mm3    Hemoglobin 14.0 13.0 - 17.7 g/dL    Hematocrit 41.0 37.5 - 51.0 %    MCV 91.3 79.0 - 97.0 fL    MCH 31.2 26.6 - 33.0 pg    MCHC 34.1 31.5 - 35.7 g/dL    RDW 12.4 12.3 - 15.4 %    RDW-SD 41.0 37.0 - 54.0 fl    MPV 9.0 6.0 - 12.0 fL    Platelets 154 140 - 450 10*3/mm3    Neutrophil % 69.0 42.7 - 76.0 %    Lymphocyte % 19.4 (L) 19.6 - 45.3 %    Monocyte % 8.1 5.0 - 12.0 %    Eosinophil % 2.7 0.3 - 6.2 %    Basophil % 0.5 0.0 - 1.5 %    Immature Grans % 0.3 0.0 - 0.5 %    Neutrophils, Absolute 4.27 1.70 - 7.00 10*3/mm3    Lymphocytes, Absolute 1.20 0.70 - 3.10 10*3/mm3    Monocytes, Absolute 0.50 0.10 - 0.90 10*3/mm3    Eosinophils, Absolute 0.17 0.00 - 0.40 10*3/mm3    Basophils, Absolute 0.03 0.00 - 0.20 10*3/mm3    Immature Grans, Absolute 0.02 0.00 - 0.05 10*3/mm3    nRBC 0.0 0.0 - 0.2 /100 WBC     Note: In addition to lab results from this visit, the labs listed above may include labs taken at another facility or during a different encounter within the last 24 hours. Please correlate lab times with ED admission and discharge times for further clarification of the services  performed during this visit.    XR Chest 1 View   Final Result   Stable chest as above without evidence of acute cardiopulmonary   abnormality.       This report was finalized on 8/20/2022 12:59 PM by Felipe Scott.            Vitals:    08/20/22 1400 08/20/22 1430 08/20/22 1500 08/20/22 1530   BP: 121/98 138/91 140/95 136/92   BP Location: Right arm Right arm Right arm Right arm   Patient Position: Lying Lying Lying Lying   Pulse: 70 70 70 80   Resp: 18 18 18 16   Temp:       TempSrc:       SpO2: 95% 93% 93% 96%   Weight:       Height:         Medications   magnesium oxide (MAG-OX) tablet 400 mg (400 mg Oral Given 8/20/22 1347)   sodium chloride 0.9 % flush 10 mL (has no administration in time range)   sodium chloride 0.9 % flush 10 mL (has no administration in time range)   buPROPion SR (WELLBUTRIN SR) 12 hr tablet 100 mg (has no administration in time range)   losartan (COZAAR) tablet 50 mg (has no administration in time range)   tamsulosin (FLOMAX) 24 hr capsule 0.4 mg (has no administration in time range)   nicotine (NICODERM CQ) 21 MG/24HR patch 1 patch (has no administration in time range)   HYDROcodone-acetaminophen (NORCO) 5-325 MG per tablet 1 tablet (1 tablet Oral Given 8/20/22 1219)   fentaNYL citrate (PF) (SUBLIMAZE) injection 50 mcg (50 mcg Intravenous Given 8/20/22 1345)     ECG/EMG Results (last 24 hours)     Procedure Component Value Units Date/Time    ECG 12 Lead [[id number]] Collected: 08/20/22 1221     Updated: 08/20/22 1221     QT Interval 450 ms      QTC Interval 525 ms     Narrative:      Test Reason : palps  Blood Pressure :   */*   mmHG  Vent. Rate :  82 BPM     Atrial Rate :  82 BPM     P-R Int : 196 ms          QRS Dur : 174 ms      QT Int : 450 ms       P-R-T Axes :  45   4  86 degrees     QTc Int : 525 ms    Atrial-sensed ventricular-paced rhythm  Abnormal ECG  When compared with ECG of 16-FEB-2022 04:33,  Electronic ventricular pacemaker has replaced Sinus rhythm    Referred By: BERE SANDHU            Confirmed By:         ECG 12 Lead   Preliminary Result   Test Reason : palps   Blood Pressure :   */*   mmHG   Vent. Rate :  82 BPM     Atrial Rate :  82 BPM      P-R Int : 196 ms          QRS Dur : 174 ms       QT Int : 450 ms       P-R-T Axes :  45   4  86 degrees      QTc Int : 525 ms      Atrial-sensed ventricular-paced rhythm   Abnormal ECG   When compared with ECG of 16-FEB-2022 04:33,   Electronic ventricular pacemaker has replaced Sinus rhythm      Referred By: ED MD           Confirmed By:                     MDM    Final diagnoses:   Diaphragmatic stimulation by cardiac pacemaker, initial encounter   History of pacemaker       ED Disposition  ED Disposition     ED Disposition   Decision to Admit    Condition   --    Comment   Level of Care: Telemetry [5]   Diagnosis: Complete heart block (HCC) [345347]   Admitting Physician: KAMALJIT LIND [[id number]]   Attending Physician: KAMALJIT LIND [[id number]]   Certification: I Certify That Inpatient Hospital Services Are Medically Necessary For Greater Than 2 Midnights               No follow-up provider specified.       Medication List      No changes were made to your prescriptions during this visit.          Juvenal Castillo MD  08/20/22 2963

## 2022-08-21 LAB
MAGNESIUM SERPL-MCNC: 1.6 MG/DL (ref 1.6–2.4)
QT INTERVAL: 450 MS
QTC INTERVAL: 525 MS

## 2022-08-21 PROCEDURE — 93005 ELECTROCARDIOGRAM TRACING: CPT | Performed by: INTERNAL MEDICINE

## 2022-08-21 PROCEDURE — 83735 ASSAY OF MAGNESIUM: CPT | Performed by: INTERNAL MEDICINE

## 2022-08-21 PROCEDURE — 99024 POSTOP FOLLOW-UP VISIT: CPT | Performed by: INTERNAL MEDICINE

## 2022-08-21 PROCEDURE — 93010 ELECTROCARDIOGRAM REPORT: CPT | Performed by: INTERNAL MEDICINE

## 2022-08-21 RX ORDER — SIMETHICONE 80 MG
80 TABLET,CHEWABLE ORAL 4 TIMES DAILY PRN
Status: DISCONTINUED | OUTPATIENT
Start: 2022-08-21 | End: 2022-08-23 | Stop reason: HOSPADM

## 2022-08-21 RX ORDER — TAMSULOSIN HYDROCHLORIDE 0.4 MG/1
0.4 CAPSULE ORAL NIGHTLY
Status: DISCONTINUED | OUTPATIENT
Start: 2022-08-22 | End: 2022-08-23 | Stop reason: HOSPADM

## 2022-08-21 RX ADMIN — Medication 400 MG: at 08:20

## 2022-08-21 RX ADMIN — BUPROPION HYDROCHLORIDE 100 MG: 100 TABLET, FILM COATED, EXTENDED RELEASE ORAL at 08:20

## 2022-08-21 RX ADMIN — Medication 1 PATCH: at 08:21

## 2022-08-21 RX ADMIN — Medication 10 ML: at 20:45

## 2022-08-21 RX ADMIN — BUPROPION HYDROCHLORIDE 100 MG: 100 TABLET, FILM COATED, EXTENDED RELEASE ORAL at 20:43

## 2022-08-21 RX ADMIN — Medication 10 ML: at 08:21

## 2022-08-21 RX ADMIN — TAMSULOSIN HYDROCHLORIDE 0.4 MG: 0.4 CAPSULE ORAL at 20:45

## 2022-08-21 RX ADMIN — LOSARTAN POTASSIUM 50 MG: 50 TABLET, FILM COATED ORAL at 20:43

## 2022-08-21 RX ADMIN — LOSARTAN POTASSIUM 50 MG: 50 TABLET, FILM COATED ORAL at 08:20

## 2022-08-21 RX ADMIN — SIMETHICONE 80 MG: 80 TABLET, CHEWABLE ORAL at 10:16

## 2022-08-21 NOTE — PROGRESS NOTES
Talked with Dr. Navarro.  Dr. Navarro believes he can get to the lead revision tomorrow afternoon/evening.  He may have an early breakfast on 8/22/2022.  Angella Frost MD, FACC

## 2022-08-21 NOTE — PLAN OF CARE
Goal Outcome Evaluation:   Pt alert and oriented x4, vs stable, and on room air. Pt has no complaints of pain after pacer turned off. Pt awaiting procedure. Will continue to monitor.

## 2022-08-21 NOTE — PROGRESS NOTES
"[facility] Cardiology Daily Note       LOS: 1 day   Patient Care Team:  Brent Botello MD as PCP - General (Family Medicine)  George Lomeli MD as Consulting Physician (Urology)  Thee Abebe MD (Cardiology)  Gaston Alcala MD as Consulting Physician (Cardiology)    Chief Complaint:  Diaphragmatic stim    Subjective     Subjective: The patient's only had about 45 minutes sleep since about 1 AM.  He kept having diaphragmatic stimulation because the threshold testing system continued to go off.    Review of Systems:   As above.    Medications:  buPROPion SR, 100 mg, Oral, Q12H  losartan, 50 mg, Oral, BID  magnesium oxide, 400 mg, Oral, Daily  nicotine, 1 patch, Transdermal, Q24H  sodium chloride, 10 mL, Intravenous, Q12H  tamsulosin, 0.4 mg, Oral, Daily        Objective     Vital Sign Min/Max for last 24 hours  Temp  Min: 97.7 °F (36.5 °C)  Max: 98.4 °F (36.9 °C)    BP  Min: 114/86  Max: 146/102   Pulse  Min: 64  Max: 85   Resp  Min: 16  Max: 19   SpO2  Min: 93 %  Max: 98 %   No data recorded   Weight  Min: 107 kg (235 lb)  Max: 107 kg (235 lb)    No intake or output data in the 24 hours ending 08/21/22 0921     Flowsheet Rows    Flowsheet Row First Filed Value   Admission Height 188 cm (74\") Documented at 08/20/2022 1149   Admission Weight 107 kg (235 lb) Documented at 08/20/2022 1149          Physical Exam:    General: Alert and oriented.   Cardiovascular: Heart has a nondisplaced focal PMI. Regular rate and rhythm without murmur, gallop or rub.  Lungs: Clear without rales or wheezes. Equal expansion is noted.   Extremities: Show no edema.   Skin: warm and dry.     Results Review:    I reviewed the patient's new clinical results.  EKG:  Tele: LBBB    Labs:    Results from last 7 days   Lab Units 08/20/22  1234   SODIUM mmol/L 137   POTASSIUM mmol/L 3.7   CHLORIDE mmol/L 102   CO2 mmol/L 26.0   BUN mg/dL 11   CREATININE mg/dL 1.21   CALCIUM mg/dL 8.9   BILIRUBIN mg/dL 0.8   ALK PHOS " U/L 79   ALT (SGPT) U/L 21   AST (SGOT) U/L 24   GLUCOSE mg/dL 94     Results from last 7 days   Lab Units 08/20/22  1234   WBC 10*3/mm3 6.19   HEMOGLOBIN g/dL 14.0   HEMATOCRIT % 41.0   PLATELETS 10*3/mm3 154     Lab Results   Component Value Date    TROPONINT <0.010 02/16/2022    TROPONINT <0.010 02/15/2022     Lipid Panel    Lipid Panel 2/16/22 2/17/22   Total Cholesterol 162 154   Triglycerides 148 148   HDL Cholesterol 42 38 (A)   VLDL Cholesterol 26 26   LDL Cholesterol  94 90   LDL/HDL Ratio 2.15 2.27   (A) Abnormal value             No results found for: INR, PROTIME      Assessment   1. Diaphragmatic stimulation  ?Pacemaker leads dislodged  a. Pacemaker reprogrammed: Atrial lead has been turned off.  Ventricular lead changed to VVIR 40 bpm, below threshold  2. History of complete heart block, cardiac standstill  a. S/p loop recorder for syncope of unknown origin  b. Noted to have pauses of > 9 seconds on interrogation, 7/14/22  c. S/p DDD PPM implant 8/3/22.  3. VHD, s/p AVR   a. Echo 2/2022: normal functioning bioprosthetic aortic valve, moderate aortic root dilation.   4. LBBB  5. HTN  6. HLD        PLAN:     1. I turned the pacemaker completely off at the bedside today at approximately 11 AM 8/21/2022.  2. Avoid all AV ying since the pacemaker is now off.  3. Leave reversion early next week hopefully Monday.  Keep n.p.o. after midnight.    4. Monitor closely on telemetry        Electronically signed by LUIS Andrea, 08/21/22, 9:21 AM EDT.      I have seen and examined the patient, case was discussed with the physician extender, reviewed the above note, necessary changes were made and I agree with the final note.  Angella Frost MD, FACC

## 2022-08-22 ENCOUNTER — APPOINTMENT (OUTPATIENT)
Dept: GENERAL RADIOLOGY | Facility: HOSPITAL | Age: 67
End: 2022-08-22

## 2022-08-22 PROCEDURE — 94660 CPAP INITIATION&MGMT: CPT

## 2022-08-22 PROCEDURE — 71046 X-RAY EXAM CHEST 2 VIEWS: CPT

## 2022-08-22 PROCEDURE — 02HK3JZ INSERTION OF PACEMAKER LEAD INTO RIGHT VENTRICLE, PERCUTANEOUS APPROACH: ICD-10-PCS | Performed by: STUDENT IN AN ORGANIZED HEALTH CARE EDUCATION/TRAINING PROGRAM

## 2022-08-22 PROCEDURE — 94799 UNLISTED PULMONARY SVC/PX: CPT

## 2022-08-22 PROCEDURE — 25010000002 ONDANSETRON PER 1 MG: Performed by: STUDENT IN AN ORGANIZED HEALTH CARE EDUCATION/TRAINING PROGRAM

## 2022-08-22 PROCEDURE — C1892 INTRO/SHEATH,FIXED,PEEL-AWAY: HCPCS | Performed by: STUDENT IN AN ORGANIZED HEALTH CARE EDUCATION/TRAINING PROGRAM

## 2022-08-22 PROCEDURE — 33235 REMOVAL PACEMAKER ELECTRODE: CPT | Performed by: STUDENT IN AN ORGANIZED HEALTH CARE EDUCATION/TRAINING PROGRAM

## 2022-08-22 PROCEDURE — 33234 REMOVAL OF PACEMAKER SYSTEM: CPT | Performed by: STUDENT IN AN ORGANIZED HEALTH CARE EDUCATION/TRAINING PROGRAM

## 2022-08-22 PROCEDURE — 33216 INSERT 1 ELECTRODE PM-DEFIB: CPT | Performed by: STUDENT IN AN ORGANIZED HEALTH CARE EDUCATION/TRAINING PROGRAM

## 2022-08-22 PROCEDURE — 0 LIDOCAINE 1 % SOLUTION: Performed by: STUDENT IN AN ORGANIZED HEALTH CARE EDUCATION/TRAINING PROGRAM

## 2022-08-22 PROCEDURE — C1889 IMPLANT/INSERT DEVICE, NOC: HCPCS | Performed by: STUDENT IN AN ORGANIZED HEALTH CARE EDUCATION/TRAINING PROGRAM

## 2022-08-22 PROCEDURE — C1769 GUIDE WIRE: HCPCS | Performed by: STUDENT IN AN ORGANIZED HEALTH CARE EDUCATION/TRAINING PROGRAM

## 2022-08-22 PROCEDURE — 99153 MOD SED SAME PHYS/QHP EA: CPT | Performed by: STUDENT IN AN ORGANIZED HEALTH CARE EDUCATION/TRAINING PROGRAM

## 2022-08-22 PROCEDURE — 93005 ELECTROCARDIOGRAM TRACING: CPT | Performed by: STUDENT IN AN ORGANIZED HEALTH CARE EDUCATION/TRAINING PROGRAM

## 2022-08-22 PROCEDURE — 25010000002 FENTANYL CITRATE (PF) 50 MCG/ML SOLUTION: Performed by: STUDENT IN AN ORGANIZED HEALTH CARE EDUCATION/TRAINING PROGRAM

## 2022-08-22 PROCEDURE — 99152 MOD SED SAME PHYS/QHP 5/>YRS: CPT | Performed by: STUDENT IN AN ORGANIZED HEALTH CARE EDUCATION/TRAINING PROGRAM

## 2022-08-22 PROCEDURE — 02PA3MZ REMOVAL OF CARDIAC LEAD FROM HEART, PERCUTANEOUS APPROACH: ICD-10-PCS | Performed by: STUDENT IN AN ORGANIZED HEALTH CARE EDUCATION/TRAINING PROGRAM

## 2022-08-22 PROCEDURE — 25010000002 MIDAZOLAM PER 1 MG: Performed by: STUDENT IN AN ORGANIZED HEALTH CARE EDUCATION/TRAINING PROGRAM

## 2022-08-22 PROCEDURE — 0 IOPAMIDOL PER 1 ML: Performed by: STUDENT IN AN ORGANIZED HEALTH CARE EDUCATION/TRAINING PROGRAM

## 2022-08-22 PROCEDURE — C1898 LEAD, PMKR, OTHER THAN TRANS: HCPCS | Performed by: STUDENT IN AN ORGANIZED HEALTH CARE EDUCATION/TRAINING PROGRAM

## 2022-08-22 DEVICE — HEMOST ABS SURGICEL PWDR 3GM: Type: IMPLANTABLE DEVICE | Site: CHEST | Status: FUNCTIONAL

## 2022-08-22 DEVICE — ABSORBABLE HEMOSTAT (OXIDIZED REGENERATED CELLULOSE, U.S.P.)
Type: IMPLANTABLE DEVICE | Site: CHEST | Status: FUNCTIONAL
Brand: SURGICEL FIBRILLAR

## 2022-08-22 DEVICE — LD PM CAPSUREFIX NOVUS5076 58CM: Type: IMPLANTABLE DEVICE | Site: CHEST | Status: FUNCTIONAL

## 2022-08-22 DEVICE — ENV PM AIGISRX ANTIBAC RESORB 2.5X2.7IN MD: Type: IMPLANTABLE DEVICE | Site: CHEST | Status: FUNCTIONAL

## 2022-08-22 RX ORDER — CEFAZOLIN SODIUM 2 G/100ML
2 INJECTION, SOLUTION INTRAVENOUS EVERY 8 HOURS
Status: DISCONTINUED | OUTPATIENT
Start: 2022-08-23 | End: 2022-08-23

## 2022-08-22 RX ORDER — SODIUM CHLORIDE 0.9 % (FLUSH) 0.9 %
10 SYRINGE (ML) INJECTION AS NEEDED
Status: DISCONTINUED | OUTPATIENT
Start: 2022-08-22 | End: 2022-08-23 | Stop reason: HOSPADM

## 2022-08-22 RX ORDER — ONDANSETRON 2 MG/ML
INJECTION INTRAMUSCULAR; INTRAVENOUS AS NEEDED
Status: DISCONTINUED | OUTPATIENT
Start: 2022-08-22 | End: 2022-08-22 | Stop reason: HOSPADM

## 2022-08-22 RX ORDER — CEFAZOLIN SODIUM 2 G/100ML
2 INJECTION, SOLUTION INTRAVENOUS
Status: COMPLETED | OUTPATIENT
Start: 2022-08-23 | End: 2022-08-22

## 2022-08-22 RX ORDER — ACETAMINOPHEN 650 MG/1
650 SUPPOSITORY RECTAL EVERY 4 HOURS PRN
Status: DISCONTINUED | OUTPATIENT
Start: 2022-08-22 | End: 2022-08-23 | Stop reason: HOSPADM

## 2022-08-22 RX ORDER — SODIUM CHLORIDE 9 MG/ML
INJECTION, SOLUTION INTRAVENOUS CONTINUOUS PRN
Status: COMPLETED | OUTPATIENT
Start: 2022-08-22 | End: 2022-08-22

## 2022-08-22 RX ORDER — ACETAMINOPHEN 325 MG/1
650 TABLET ORAL EVERY 4 HOURS PRN
Status: DISCONTINUED | OUTPATIENT
Start: 2022-08-22 | End: 2022-08-23 | Stop reason: HOSPADM

## 2022-08-22 RX ORDER — SODIUM CHLORIDE 0.9 % (FLUSH) 0.9 %
3 SYRINGE (ML) INJECTION EVERY 12 HOURS SCHEDULED
Status: DISCONTINUED | OUTPATIENT
Start: 2022-08-22 | End: 2022-08-23 | Stop reason: HOSPADM

## 2022-08-22 RX ORDER — FENTANYL CITRATE 50 UG/ML
INJECTION, SOLUTION INTRAMUSCULAR; INTRAVENOUS AS NEEDED
Status: DISCONTINUED | OUTPATIENT
Start: 2022-08-22 | End: 2022-08-22 | Stop reason: HOSPADM

## 2022-08-22 RX ORDER — LIDOCAINE HYDROCHLORIDE 10 MG/ML
INJECTION, SOLUTION INFILTRATION; PERINEURAL AS NEEDED
Status: DISCONTINUED | OUTPATIENT
Start: 2022-08-22 | End: 2022-08-22 | Stop reason: HOSPADM

## 2022-08-22 RX ORDER — HYDROCODONE BITARTRATE AND ACETAMINOPHEN 5; 325 MG/1; MG/1
1 TABLET ORAL EVERY 6 HOURS PRN
Status: DISCONTINUED | OUTPATIENT
Start: 2022-08-22 | End: 2022-08-23 | Stop reason: HOSPADM

## 2022-08-22 RX ORDER — HYDROMORPHONE HYDROCHLORIDE 1 MG/ML
0.5 INJECTION, SOLUTION INTRAMUSCULAR; INTRAVENOUS; SUBCUTANEOUS
Status: DISCONTINUED | OUTPATIENT
Start: 2022-08-22 | End: 2022-08-23 | Stop reason: HOSPADM

## 2022-08-22 RX ORDER — MIDAZOLAM HYDROCHLORIDE 1 MG/ML
INJECTION INTRAMUSCULAR; INTRAVENOUS AS NEEDED
Status: DISCONTINUED | OUTPATIENT
Start: 2022-08-22 | End: 2022-08-22 | Stop reason: HOSPADM

## 2022-08-22 RX ADMIN — LOSARTAN POTASSIUM 50 MG: 50 TABLET, FILM COATED ORAL at 20:24

## 2022-08-22 RX ADMIN — Medication 10 ML: at 08:11

## 2022-08-22 RX ADMIN — HYDROCODONE BITARTRATE AND ACETAMINOPHEN 1 TABLET: 5; 325 TABLET ORAL at 20:39

## 2022-08-22 RX ADMIN — TAMSULOSIN HYDROCHLORIDE 0.4 MG: 0.4 CAPSULE ORAL at 22:37

## 2022-08-22 RX ADMIN — Medication 10 ML: at 22:42

## 2022-08-22 RX ADMIN — BUPROPION HYDROCHLORIDE 100 MG: 100 TABLET, FILM COATED, EXTENDED RELEASE ORAL at 22:37

## 2022-08-22 RX ADMIN — BUPROPION HYDROCHLORIDE 100 MG: 100 TABLET, FILM COATED, EXTENDED RELEASE ORAL at 08:09

## 2022-08-22 RX ADMIN — Medication 400 MG: at 08:09

## 2022-08-22 RX ADMIN — Medication 1 PATCH: at 08:10

## 2022-08-22 RX ADMIN — LOSARTAN POTASSIUM 50 MG: 50 TABLET, FILM COATED ORAL at 08:09

## 2022-08-22 RX ADMIN — CEFAZOLIN SODIUM 2 G: 2 INJECTION, SOLUTION INTRAVENOUS at 18:09

## 2022-08-22 NOTE — CASE MANAGEMENT/SOCIAL WORK
Discharge Planning Assessment  [facility]     Patient Name: Andrae Garza Jr.  MRN: [id number]  Today's Date: 8/22/2022    Admit Date: 8/20/2022     Discharge Needs Assessment     Row Name 08/22/22 0946       Living Environment    People in Home spouse    Current Living Arrangements home    Living Arrangement Comments Lives in [place] w/spouse in a home.       Transition Planning    Patient/Family Anticipates Transition to home    Transportation Anticipated family or friend will provide       Discharge Needs Assessment    Equipment Currently Used at Home none               Discharge Plan     Row Name 08/22/22 0946       Plan    Plan Home    Patient/Family in Agreement with Plan yes    Plan Comments Spoke w/ pt in room, plan is home @ d/c. Insurance confirmed. Fills scripts @ [facility]. Not current w/HH or uses DME. Plan for pacemaker lead revision today. Family will transport home. No other d/c needs verbalized @ this time.    Final Discharge Disposition Code 01 - home or self-care    Row Name 08/22/22 0810       Plan    Final Discharge Disposition Code 01 - home or self-care              Continued Care and Services - Admitted Since 8/20/2022    Coordination has not been started for this encounter.       Expected Discharge Date and Time     Expected Discharge Date Expected Discharge Time    Aug 23, 2022          Demographic Summary     Row Name 08/22/22 0945       General Information    Admission Type inpatient    Arrived From home    Referral Source emergency department    Preferred Language English    General Information Comments Has POA & LW paperwork. PCP Dr Brent Botello               Functional Status     Row Name 08/22/22 0946       Functional Status    Usual Activity Tolerance good       Functional Status, IADL    Medications independent    Meal Preparation independent    Housekeeping independent    Laundry independent    Shopping independent               Psychosocial    No documentation.                 Abuse/Neglect    No documentation.                Legal    No documentation.                Substance Abuse    No documentation.                Patient Forms    No documentation.                   Del Stein RN

## 2022-08-22 NOTE — PROGRESS NOTES
"[facility]  Progress Note       LOS: 2 days   Patient Care Team:  Brent Botello MD as PCP - General (Family Medicine)  George Lomeli MD as Consulting Physician (Urology)  Thee Abebe MD (Cardiology)  Gaston Alcala MD as Consulting Physician (Cardiology)    Chief Complaint:  Pacemaker diaphragmatic stimulation    Subjective     Interval History:   Sitting in bed on exam this. Reports feeling \"normal\" now that his pacemaker is off. Denies chest pain. NPO after breakfast this AM.      Review of Systems:   Pertinent positives in HPI, all others reviewed and negative.      Objective       Current Facility-Administered Medications:   •  buPROPion SR (WELLBUTRIN SR) 12 hr tablet 100 mg, 100 mg, Oral, Q12H, Kuns-Figueroa, Katharina B, APRN, 100 mg at 08/21/22 2043  •  losartan (COZAAR) tablet 50 mg, 50 mg, Oral, BID, Kuns-Figueroa, Katharina B, APRN, 50 mg at 08/21/22 2043  •  magnesium oxide (MAG-OX) tablet 400 mg, 400 mg, Oral, Daily, Juvenal Castillo MD, 400 mg at 08/21/22 0820  •  nicotine (NICODERM CQ) 21 MG/24HR patch 1 patch, 1 patch, Transdermal, Q24H, Angella Forst MD, 1 patch at 08/21/22 0821  •  simethicone (MYLICON) chewable tablet 80 mg, 80 mg, Oral, 4x Daily PRN, Kuns-Figueroa, Katharina B, APRN, 80 mg at 08/21/22 1016  •  sodium chloride 0.9 % flush 10 mL, 10 mL, Intravenous, Q12H, Kuns-Figueroa, Katharina B, APRN, 10 mL at 08/21/22 2045  •  sodium chloride 0.9 % flush 10 mL, 10 mL, Intravenous, PRN, Kuns-Figueroa, Katharina B, APRN  •  tamsulosin (FLOMAX) 24 hr capsule 0.4 mg, 0.4 mg, Oral, Nightly, Jerad Navarro MD    Vital Sign Min/Max for last 24 hours  Temp  Min: 98.1 °F (36.7 °C)  Max: 98.2 °F (36.8 °C)   BP  Min: 129/85  Max: 149/83   Pulse  Min: 62  Max: 83   Resp  Min: 18  Max: 18   SpO2  Min: 95 %  Max: 96 %   No data recorded   No data recorded     Flowsheet Rows    Flowsheet Row First Filed Value   Admission Height 188 cm (74\") Documented at " 08/20/2022 1149   Admission Weight 107 kg (235 lb) Documented at 08/20/2022 1149            Intake/Output Summary (Last 24 hours) at 8/22/2022 0804  Last data filed at 8/21/2022 1500  Gross per 24 hour   Intake 240 ml   Output --   Net 240 ml       Physical Exam:      General Appearance:    Alert, cooperative, in no acute distress   Lungs:     Clear to auscultation,respirations regular, even and                  unlabored    Heart:    Regular rhythm and normal rate, normal S1 and S2, no            murmur, no gallop, no rub, no click   Chest Wall:    No abnormalities observed   Abdomen:     Normal bowel sounds,soft, non-tender, non-distended   Extremities:   Moves all extremities well, no edema, no cyanosis, no             redness   Pulses:   Pulses palpable and equal bilaterally   Skin:   JES chest pacemaker site is healing, slight redness below insicion, scab L lateral portion of incision.        Results Review:   Results from last 7 days   Lab Units 08/20/22  1234   WBC 10*3/mm3 6.19   HEMOGLOBIN g/dL 14.0   HEMATOCRIT % 41.0   PLATELETS 10*3/mm3 154     Results from last 7 days   Lab Units 08/20/22  1234   SODIUM mmol/L 137   POTASSIUM mmol/L 3.7   CHLORIDE mmol/L 102   CO2 mmol/L 26.0   BUN mg/dL 11   CREATININE mg/dL 1.21   GLUCOSE mg/dL 94                                Intake/Output Summary (Last 24 hours) at 8/22/2022 0804  Last data filed at 8/21/2022 1500  Gross per 24 hour   Intake 240 ml   Output --   Net 240 ml       I personally viewed and interpreted the patient's EKG/Telemetry data    EKG: No new EKG this a.m.    Telemetry: NSR, 1st degree AV block, ~70s bpm during exam    Ejection Fraction  No results found for: EF    Echo EF Estimated  Lab Results   Component Value Date    ECHOEFEST 50 02/15/2022         Present on Admission:  • Complete heart block (HCC)    Assessment & Plan   1.  Diaphragmatic stimulation, possible pacemaker lead dislodgment  - History of complete heart block, status post  dual-chamber pacemaker implant 8/3/2022.  Patient presented to the ED on 8/20/2022 with diaphragmatic stimulation.  - Initially, atrial lead was turned off and V lead rate set to 40 bpm but stimulation continued, now ventricular lead is also off (since 08/21/2022 11 AM) and pt reports significant improvement.  - Plan for RV lead revision today with Dr. Navarro.  Procedure will be this afternoon, okay for patient to eat breakfast this a.m. then n.p.o.  - Pacemaker is off, continue to monitor closely on telemetry.    Plan for disposition: Pacemaker lead revision today    Electronically signed by LUIS Benavides, 08/22/22, 10:01 AM EDT.

## 2022-08-23 ENCOUNTER — READMISSION MANAGEMENT (OUTPATIENT)
Dept: CALL CENTER | Facility: HOSPITAL | Age: 67
End: 2022-08-23

## 2022-08-23 ENCOUNTER — TELEPHONE (OUTPATIENT)
Dept: FAMILY MEDICINE CLINIC | Facility: CLINIC | Age: 67
End: 2022-08-23

## 2022-08-23 VITALS
TEMPERATURE: 98.1 F | RESPIRATION RATE: 18 BRPM | SYSTOLIC BLOOD PRESSURE: 135 MMHG | HEART RATE: 79 BPM | HEIGHT: 74 IN | DIASTOLIC BLOOD PRESSURE: 92 MMHG | BODY MASS INDEX: 30.16 KG/M2 | OXYGEN SATURATION: 97 % | WEIGHT: 235 LBS

## 2022-08-23 LAB
QT INTERVAL: 480 MS
QTC INTERVAL: 543 MS

## 2022-08-23 PROCEDURE — 25010000002 CEFAZOLIN IN DEXTROSE 2-4 GM/100ML-% SOLUTION: Performed by: STUDENT IN AN ORGANIZED HEALTH CARE EDUCATION/TRAINING PROGRAM

## 2022-08-23 PROCEDURE — 25010000002 CEFAZOLIN IN DEXTROSE 2-4 GM/100ML-% SOLUTION: Performed by: NURSE PRACTITIONER

## 2022-08-23 RX ORDER — CEFAZOLIN SODIUM 2 G/100ML
2 INJECTION, SOLUTION INTRAVENOUS EVERY 8 HOURS
Status: DISCONTINUED | OUTPATIENT
Start: 2022-08-23 | End: 2022-08-23 | Stop reason: HOSPADM

## 2022-08-23 RX ORDER — CEPHALEXIN 500 MG/1
500 CAPSULE ORAL 3 TIMES DAILY
Qty: 15 CAPSULE | Refills: 0 | Status: SHIPPED | OUTPATIENT
Start: 2022-08-23 | End: 2022-08-28

## 2022-08-23 RX ADMIN — HYDROCODONE BITARTRATE AND ACETAMINOPHEN 1 TABLET: 5; 325 TABLET ORAL at 09:39

## 2022-08-23 RX ADMIN — CEFAZOLIN SODIUM 2 G: 2 INJECTION, SOLUTION INTRAVENOUS at 02:34

## 2022-08-23 RX ADMIN — BUPROPION HYDROCHLORIDE 100 MG: 100 TABLET, FILM COATED, EXTENDED RELEASE ORAL at 08:04

## 2022-08-23 RX ADMIN — LOSARTAN POTASSIUM 50 MG: 50 TABLET, FILM COATED ORAL at 08:05

## 2022-08-23 RX ADMIN — Medication 1 PATCH: at 08:07

## 2022-08-23 RX ADMIN — HYDROCODONE BITARTRATE AND ACETAMINOPHEN 1 TABLET: 5; 325 TABLET ORAL at 03:01

## 2022-08-23 RX ADMIN — Medication 10 ML: at 08:05

## 2022-08-23 RX ADMIN — Medication 400 MG: at 08:04

## 2022-08-23 NOTE — TELEPHONE ENCOUNTER
Called patient to get in with dr gan, patient decided he didn't want to see dr gan or penny. He would call back at a later date to schedule.

## 2022-08-23 NOTE — TELEPHONE ENCOUNTER
Dr gan didn't have anything for a hospital fu within 2 weeks. Added on dr penny smart for Thursday at 1230 for now. Patient asked for dr gan and wanted to know if we are able to work him in with him within 2 weeks and call patient to r/s    Was in for a leaking pacemaker

## 2022-08-23 NOTE — OUTREACH NOTE
Prep Survey    Flowsheet Row Responses   [facility] patient discharged from? [place]   Is LACE score < 7 ? No   Emergency Room discharge w/ pulse ox? No   Eligibility [facility]   Date of Admission 08/20/22   Date of Discharge 08/23/22   Discharge Disposition Home or Self Care   Discharge diagnosis Lead Revision- Diaphragmatic stimulation by cardiac pacemaker   Does the patient have one of the following disease processes/diagnoses(primary or secondary)? Other   Does the patient have Home health ordered? No   Is there a DME ordered? No   Prep survey completed? Yes          KATLYN CESAR - Registered Nurse

## 2022-08-23 NOTE — CASE MANAGEMENT/SOCIAL WORK
Case Management Discharge Note      Final Note: Spoke w/pt in room. Requesting a cloth sling, pt stated he will go to [facility] in [place] after discharge to get one. No other d/c needs @ this time. Family will transport.         Selected Continued Care - Admitted Since 8/20/2022     Destination    No services have been selected for the patient.              Durable Medical Equipment    No services have been selected for the patient.              Dialysis/Infusion    No services have been selected for the patient.              Home Medical Care    No services have been selected for the patient.              Therapy    No services have been selected for the patient.              Community Resources    No services have been selected for the patient.              Community & DME    No services have been selected for the patient.                       Final Discharge Disposition Code: 01 - home or self-care

## 2022-08-23 NOTE — TELEPHONE ENCOUNTER
I sent a message to the front office to call him.  Per front office, he did not want to come in tomorrow and wanted next week.  Called back to offer an appointment for Monday or Tuesday and he does not wish to come in at this time.  He will call and schedule when he is able.

## 2022-08-23 NOTE — PROGRESS NOTES
[facility]  Progress Note       LOS: 3 days   Patient Care Team:  Brent Botello MD as PCP - General (Family Medicine)  George Lomeli MD as Consulting Physician (Urology)  Thee Abebe MD (Cardiology)  Gaston Alcala MD as Consulting Physician (Cardiology)    Chief Complaint:  Pacemaker diaphragmatic stimulation    Subjective     Interval History:   Sitting in bed eating breakfast on exam this AM. Slept poorly, requiring pain medication q6h. Reports pain with L arm movement and pressure dressing pulling skin.      Review of Systems:   Pertinent positives in HPI, all others reviewed and negative.      Objective       Current Facility-Administered Medications:   •  acetaminophen (TYLENOL) tablet 650 mg, 650 mg, Oral, Q4H PRN **OR** acetaminophen (TYLENOL) suppository 650 mg, 650 mg, Rectal, Q4H PRN, Jerad Navarro MD  •  buPROPion SR (WELLBUTRIN SR) 12 hr tablet 100 mg, 100 mg, Oral, Q12H, Katharina Hope B, APRN, 100 mg at 08/23/22 0804  •  ceFAZolin in dextrose (ANCEF) IVPB solution 2 g, 2 g, Intravenous, Q8H, Felipe Palomo IV, PharmD  •  HYDROcodone-acetaminophen (NORCO) 5-325 MG per tablet 1 tablet, 1 tablet, Oral, Q6H PRN, Jerad Navarro MD, 1 tablet at 08/23/22 0301  •  HYDROmorphone (DILAUDID) injection 0.5 mg, 0.5 mg, Intravenous, Q2H PRN, Jerad Navarro MD  •  losartan (COZAAR) tablet 50 mg, 50 mg, Oral, BID, Keily Hopeice B, APRN, 50 mg at 08/23/22 0805  •  magnesium oxide (MAG-OX) tablet 400 mg, 400 mg, Oral, Daily, Juvenal Castillo MD, 400 mg at 08/23/22 0804  •  nicotine (NICODERM CQ) 21 MG/24HR patch 1 patch, 1 patch, Transdermal, Q24H, Angella Frost MD, 1 patch at 08/23/22 0807  •  simethicone (MYLICON) chewable tablet 80 mg, 80 mg, Oral, 4x Daily PRN, Katharina Hope, APRN, 80 mg at 08/21/22 1016  •  sodium chloride 0.9 % flush 10 mL, 10 mL, Intravenous, Q12H, Katharina Hope, APRN, 10 mL at 08/23/22  "0805  •  sodium chloride 0.9 % flush 10 mL, 10 mL, Intravenous, PRN, Katharina Hope APRN  •  sodium chloride 0.9 % flush 10 mL, 10 mL, Intravenous, PRN, Jerad Navarro MD  •  sodium chloride 0.9 % flush 3 mL, 3 mL, Intravenous, Q12H, Jerad Navarro MD  •  tamsulosin (FLOMAX) 24 hr capsule 0.4 mg, 0.4 mg, Oral, Nightly, Jerad Navarro MD, 0.4 mg at 08/22/22 2237    Vital Sign Min/Max for last 24 hours  Temp  Min: 98 °F (36.7 °C)  Max: 98.2 °F (36.8 °C)   BP  Min: 114/73  Max: 155/95   Pulse  Min: 67  Max: 110   Resp  Min: 13  Max: 20   SpO2  Min: 90 %  Max: 100 %   Flow (L/min)  Min: 2  Max: 2   No data recorded     Flowsheet Rows    Flowsheet Row First Filed Value   Admission Height 188 cm (74\") Documented at 08/20/2022 1149   Admission Weight 107 kg (235 lb) Documented at 08/20/2022 1149            Intake/Output Summary (Last 24 hours) at 8/23/2022 0824  Last data filed at 8/22/2022 1959  Gross per 24 hour   Intake 500 ml   Output --   Net 500 ml       Physical Exam:      General Appearance:    Alert, cooperative, in no acute distress   Lungs:     Clear to auscultation,respirations regular, even and                  unlabored    Heart:    Regular rhythm and normal rate, normal S1 and S2, no            murmur, no gallop, no rub, no click   Chest Wall:    No abnormalities observed   Abdomen:     soft, non-tender, non-distended   Extremities:   Moves all extremities well, no edema, no cyanosis, no             redness   Pulses:   Pulses palpable and equal bilaterally   Skin:   JES chest PPM site C/D/I. Safeguard dressing in place with 30 ml air. No bleeding, bruising, drainage noted.        Results Review:   Results from last 7 days   Lab Units 08/20/22  1234   WBC 10*3/mm3 6.19   HEMOGLOBIN g/dL 14.0   HEMATOCRIT % 41.0   PLATELETS 10*3/mm3 154     Results from last 7 days   Lab Units 08/20/22  1234   SODIUM mmol/L 137   POTASSIUM mmol/L 3.7   CHLORIDE mmol/L 102   CO2 mmol/L 26.0   BUN mg/dL 11   CREATININE " mg/dL 1.21   GLUCOSE mg/dL 94                                Intake/Output Summary (Last 24 hours) at 8/23/2022 0824  Last data filed at 8/22/2022 1959  Gross per 24 hour   Intake 500 ml   Output --   Net 500 ml       I personally viewed and interpreted the patient's EKG/Telemetry data    EKG: No new EKG this a.m.    Telemetry: NSR, 1st degree AV block, ~80s bpm during exam    Ejection Fraction  No results found for: EF    Echo EF Estimated  Lab Results   Component Value Date    ECHOEFEST 50 02/15/2022         Present on Admission:  • Complete heart block (HCC)    Assessment & Plan   1.  Diaphragmatic stimulation, possible pacemaker lead dislodgment  - History of complete heart block, status post dual-chamber pacemaker implant 8/3/2022.  Patient presented to the ED on 8/20/2022 with diaphragmatic stimulation.  - RV lead revision yesterday (08/22/2022) with Dr. Navarro. Dressing C/D/I. Safeguard in place with 30 ml air. 30 ml air removed this AM, monitored for 5 minutes afterwards with no signs of bleeding or swelling noted. Discussed care with RN, plan is for RN to monitor dressing for bleeding x 1 hour then remove Safeguard dressing, leaving dressing below in place. RN will page EP if bleeding occurs.  - Pending device interrogation this morning.  - Ok to d/c home pending the above. Wound check in 7-10 days, follow up with Dr. Navarro in clinic in 3 months.    Electronically signed by LUIS Benavides, 08/23/22, 8:31 AM EDT.    ADDENDUM:  Device check within normal limits today.  Safeguard dressing removed by RN.  Dressing is stable.  Okay to discharge from EP.  Keep follow-up as listed above.    Electronically signed by LUIS Benavides, 08/23/22, 11:09 AM EDT.

## 2022-08-23 NOTE — TELEPHONE ENCOUNTER
I reviewed recent hospitalization records and blood work.  Looks like he does not need labs so I can see him at 445 on Thursday, August 25 if that works.    I will send a message to the .

## 2022-08-24 ENCOUNTER — TRANSITIONAL CARE MANAGEMENT TELEPHONE ENCOUNTER (OUTPATIENT)
Dept: CALL CENTER | Facility: HOSPITAL | Age: 67
End: 2022-08-24

## 2022-08-24 LAB
QT INTERVAL: 438 MS
QT INTERVAL: 442 MS
QTC INTERVAL: 516 MS
QTC INTERVAL: 524 MS

## 2022-08-24 NOTE — OUTREACH NOTE
Call Center TCM Note    Flowsheet Row Responses   [facility] patient discharged from? [place]   Does the patient have one of the following disease processes/diagnoses(primary or secondary)? Other   TCM attempt successful? Yes  [WIFE]   Call start time 1003   Call end time 1009   Discharge diagnosis Lead Revision- Diaphragmatic stimulation by cardiac pacemaker   Meds reviewed with patient/caregiver? Yes   Is the patient having any side effects they believe may be caused by any medication additions or changes? No   Does the patient have all medications ordered at discharge? Yes   Is the patient taking all medications as directed (includes completed medication regime)? Yes   Does the patient have a primary care provider?  Yes   Does the patient have an appointment with their PCP within 7 days of discharge? No   What is preventing the patient from scheduling follow up appointments within 7 days of discharge? --  [Pt declines PCP appt. ]   Nursing Interventions Educated patient on importance of making appointment  [Pt continues to decline an appt with PCP ]   Has the patient kept scheduled appointments due by today? N/A   Has home health visited the patient within 72 hours of discharge? N/A   Psychosocial issues? No   Did the patient receive a copy of their discharge instructions? Yes   Nursing interventions Reviewed instructions with patient   What is the patient's perception of their health status since discharge? Improving   Is the patient/caregiver able to teach back signs and symptoms related to disease process for when to call PCP? Yes   Is the patient/caregiver able to teach back signs and symptoms related to disease process for when to call 911? Yes   Is the patient/caregiver able to teach back the hierarchy of who to call/visit for symptoms/problems? PCP, Specialist, Home health nurse, Urgent Care, ED, 911 Yes   TCM call completed? Yes   Wrap up additional comments Pt doing ok at this time. States  surgical site looks fine. No s/s of infection.           Venita Liz RN    8/24/2022, 10:10 EDT

## 2022-08-24 NOTE — DISCHARGE SUMMARY
"  [facility]  DISCHARGE SUMMARY    Date of Discharge:  8/24/2022    Discharge Diagnosis: RV lead dislodgment    Presenting Problem/History of Present Illness  Complete heart block (HCC) [I44.2]    PROBLEM LIST:   1. CAD  1. 10/21 CT chest revealing coronary calcifications.  2. Syncope and collapse  a. 9/21 Syncopal episode felt to be due to orthostatic hypotension  with negative EEG-data deficit  b. [facility] 2/2022: nl cors EF 50%  c. 2/22 echo EF 50%, nl AVR fxn  d. 2/22 [place] scientific loop recorder implanted - Dr Vinay gregg 9 second pause noted on loop recorder interrogation, 7/14/22.  f. S/p Loop removal, 8/3/22.  g. S/p MDT DDD PPM implant, W1  01 model, [id number] serial number, JWS, 8/3/22.   h. Patient presented to ED with complaints of diaphragmatic stimulation, 8/20/22.   i. RV lead revision 8/22/2022, Dr. Navarro  2. Bicuspid aortic valve   a. Status post bioprosthetic aortic valve replacement January 2008 at [facility]-data deficit      b. Echo 2/15/22: bioprosthetic aortic valve present with acceptable hemodynamics and leaflet excursion with suboptimal visualization, moderate dilation of the aortic root, LV borderline dilated, EF 46-50%, RVSP < 35 mmHg. Grade I diastolic dysfunction. Moderately reduced RV systolic function. Abnormal septal motion consistent with BBB.                     3. Carotid artery disease  1. 2/22 carotid US: Proximal right ICA artery normal.  Proximal left ICA artery plaque without significant stenosis.  LICA stenosis of 0-49%.  Antegrade vertebrals bilaterally.  4. LBBB  5. Hypertension  6. Hyperlipidemia  1. 2/22 154/148/38/90  7. Obstructive sleep apnea, noncompliant with CPAP-controlled with \"sleep shirt\" with tennis ball in the center  8. Current tobacco use  8. History of renal cell carcinoma, status post nephrectomy Dr Lomeli  9. Surgical history:  a. Cholecystectomy  b. AVR  c. Nephrectomy    Hospital Course  Patient is a 67 y.o. male presented with " the above-noted past medical history who presented to the [facility] ED on 8/20/2022 with complaints of pain in his right upper quadrant/ right lower lobe.  His dual-chamber pacemaker was originally implanted on 8/3/2022. Upon presentation to the ED, his device was interrogated, AV lead was turned off and eventually RV lead was turned off during his stay due to apparent diaphragm stimulation. RV lead revision was performed on 08/22/2022 with Dr. Navarro and he was discharged home on 08/23/2022 in stable condition.     Procedures Performed  Procedure(s):  LEAD REVISION       Consults:   Consults     No orders found from 7/22/2022 to 8/21/2022.          Pertinent Test Results: See medical record    Condition on Discharge:  Stable    Discharge Disposition: Home in stable condition    Discharge Diet: Cardiac heart healthy diet    Activity at Discharge: L arm precautions, as instructed.    Follow-up Appointments  Future Appointments   Date Time Provider Department Center   8/30/2022  2:30 PM WOUND CHECK E [facility]   10/27/2022  8:30 AM Brent Botello MD E [facility]   11/15/2022  3:30 PM Jerad Navarro MD E [facility]   6/1/2023  9:00 AM Brent Botello MD E [facility]     Additional Instructions for the Follow-ups that You Need to Schedule     Discharge Follow-up with Specialty: Wound check, 7 to 10 days   As directed      Specialty: Wound check, 7 to 10 days               Discharge Medications     Discharge Medications      New Medications      Instructions Start Date   cephalexin 500 MG capsule  Commonly known as: Keflex   500 mg, Oral, 3 Times Daily         Changes to Medications      Instructions Start Date   losartan 50 MG tablet  Commonly known as: COZAAR  What changed: when to take this   50 mg, Oral, Daily         Continue These Medications      Instructions Start Date   buPROPion  MG 12 hr tablet  Commonly known as: Wellbutrin SR   One po daily for 1 week then one po BID       fexofenadine 60 MG tablet  Commonly known as: ALLEGRA   180 mg, Oral, Daily      multivitamin with minerals tablet tablet   1 tablet, Oral, Daily      tamsulosin 0.4 MG capsule 24 hr capsule  Commonly known as: Flomax   0.4 mg, Oral, Daily      vitamin B-12 1000 MCG tablet  Commonly known as: CYANOCOBALAMIN   1,000 mcg, Oral, Daily              Myrna Schwartz, LUIS  08/24/22  14:01 EDT

## 2022-08-26 ENCOUNTER — TELEPHONE (OUTPATIENT)
Dept: CARDIOLOGY | Facility: CLINIC | Age: 67
End: 2022-08-26

## 2022-08-26 NOTE — TELEPHONE ENCOUNTER
Patient had a RV lead revision on 8/22/22. His wife called stating that his dressing is saturated with blood and oozing through the dressing. He is going to come in today for a wound check at 11:30 or 12:00.

## 2022-08-26 NOTE — TELEPHONE ENCOUNTER
José Brarth PAC examined the patient. He took the dressing off, cleaned it and instructed the patient to leave it open to air until he comes back on 8/30/22 for his scheduled wound check. The wound had no s/s of infection present.

## 2022-08-30 RX ORDER — LOSARTAN POTASSIUM 50 MG/1
50 TABLET ORAL DAILY
Qty: 30 TABLET | Refills: 11 | Status: SHIPPED | OUTPATIENT
Start: 2022-08-30 | End: 2022-08-31 | Stop reason: SDUPTHER

## 2022-08-31 RX ORDER — LOSARTAN POTASSIUM 50 MG/1
50 TABLET ORAL 2 TIMES DAILY
Qty: 180 TABLET | Refills: 3 | Status: SHIPPED | OUTPATIENT
Start: 2022-08-31

## 2022-09-12 PROCEDURE — 93294 REM INTERROG EVL PM/LDLS PM: CPT | Performed by: STUDENT IN AN ORGANIZED HEALTH CARE EDUCATION/TRAINING PROGRAM

## 2022-09-12 PROCEDURE — 93296 REM INTERROG EVL PM/IDS: CPT | Performed by: STUDENT IN AN ORGANIZED HEALTH CARE EDUCATION/TRAINING PROGRAM

## 2022-10-03 ENCOUNTER — TELEPHONE (OUTPATIENT)
Dept: CARDIOLOGY | Facility: CLINIC | Age: 67
End: 2022-10-03

## 2022-10-03 NOTE — TELEPHONE ENCOUNTER
Jerad Navarro MD  You 46 minutes ago (3:45 PM)         I think that is fine.  Just have him take take it a bit slow and be careful

## 2022-10-27 ENCOUNTER — OFFICE VISIT (OUTPATIENT)
Dept: FAMILY MEDICINE CLINIC | Facility: CLINIC | Age: 67
End: 2022-10-27

## 2022-10-27 VITALS
HEART RATE: 79 BPM | BODY MASS INDEX: 31.18 KG/M2 | RESPIRATION RATE: 18 BRPM | SYSTOLIC BLOOD PRESSURE: 142 MMHG | OXYGEN SATURATION: 99 % | TEMPERATURE: 97.9 F | HEIGHT: 74 IN | WEIGHT: 243 LBS | DIASTOLIC BLOOD PRESSURE: 88 MMHG

## 2022-10-27 DIAGNOSIS — T14.8XXA BRUISING: ICD-10-CM

## 2022-10-27 DIAGNOSIS — E83.42 HYPOMAGNESEMIA: ICD-10-CM

## 2022-10-27 DIAGNOSIS — E78.2 MIXED HYPERLIPIDEMIA: ICD-10-CM

## 2022-10-27 DIAGNOSIS — E53.8 B12 DEFICIENCY: ICD-10-CM

## 2022-10-27 DIAGNOSIS — J30.9 ALLERGIC RHINITIS, UNSPECIFIED SEASONALITY, UNSPECIFIED TRIGGER: ICD-10-CM

## 2022-10-27 DIAGNOSIS — R25.1 TREMOR: ICD-10-CM

## 2022-10-27 DIAGNOSIS — Z23 NEED FOR INFLUENZA VACCINATION: ICD-10-CM

## 2022-10-27 DIAGNOSIS — I10 ESSENTIAL HYPERTENSION: Primary | ICD-10-CM

## 2022-10-27 PROCEDURE — 90662 IIV NO PRSV INCREASED AG IM: CPT | Performed by: FAMILY MEDICINE

## 2022-10-27 PROCEDURE — 99214 OFFICE O/P EST MOD 30 MIN: CPT | Performed by: FAMILY MEDICINE

## 2022-10-27 PROCEDURE — G0008 ADMIN INFLUENZA VIRUS VAC: HCPCS | Performed by: FAMILY MEDICINE

## 2022-10-27 NOTE — PROGRESS NOTES
Chief Complaint  5 month f/u (Pace maker in August, ) and Hypertension    Subjective          Andrae Garza . presents to [facility]  History of Present Illness    Pacemaker  The patient reports that he has recovered from his pacemaker surgery in 2022. He states that he has not been able to get out and do much for the last 10 days. He denies any dizziness, syncope, chest pain, or shortness of breath. He reports that he has mild swelling in his legs. He states that he is taking losartan twice daily. He reports that his blood pressure is always elevated in the morning. He states that he takes tamsulosin at night. He states that he does not feel better since having the pacemaker placed. He reports that he had a syncopal episode before he had the loop monitor. He states that he was sitting watching TV and his wife heard him holler and she came out and he fell over. He states that his wife told him that he had no pulse. He states that he did not eat today. He reports that he is taking B12 tablets.    Tremor  He reports that he has slight tremors. He states that his father  from a heart attack at the age of 56. He reports that he has a tremor when he drinks something, brushes his teeth, or puts toothpaste on his toothbrush.     Bleeding  He asks why he bleeds so badly when he is not on blood thinners. He denies there is any blood in his urine. He states that if he gets a small scratch, it bleeds a lot.  He does have bruising of his upper arms as well.    Allergies  He reports that when he quit taking Allegra, his head became much clearer. He reports that he is back to his nose running and coughing up phlegm every morning. He states that his mind is sharper and he has more desire to get out and do something.    Health maintenance  He reports that he has not received the COVID-19 booster. He states that he is not interested in getting the new COVID-19 booster at the same time as  "his influenza vaccine today. He reports that he is thinking about stopping to get the shingles vaccine, but he is not sure if he wants to do them at the same time. He states that he does not need refills on anything. He reports that he has an appointment with Dr. Lomeli in 12/2022.    The patient will have lab work done.     Review of Systems   Constitutional: Negative.    HENT: Negative.    Respiratory: Negative.    Cardiovascular: Negative.    Gastrointestinal: Negative.    Genitourinary: Negative.    Hematological: Bruises/bleeds easily.   All other systems reviewed and are negative.       Objective       Vital Signs:   /88   Pulse 79   Temp 97.9 °F (36.6 °C)   Resp 18   Ht 188 cm (74\")   Wt 110 kg (243 lb)   SpO2 99%   BMI 31.20 kg/m²     Physical Exam  Vitals and nursing note reviewed.   Constitutional:       General: He is not in acute distress.     Appearance: Normal appearance. He is well-developed. He is not ill-appearing.   HENT:      Head: Normocephalic and atraumatic.      Right Ear: Hearing, tympanic membrane, ear canal and external ear normal.      Left Ear: Hearing, tympanic membrane, ear canal and external ear normal.      Nose: Nose normal. No congestion or rhinorrhea.      Mouth/Throat:      Mouth: Mucous membranes are moist.      Pharynx: No oropharyngeal exudate or posterior oropharyngeal erythema.   Eyes:      General: Lids are normal.      Conjunctiva/sclera: Conjunctivae normal.      Pupils: Pupils are equal, round, and reactive to light.   Neck:      Thyroid: No thyromegaly.   Cardiovascular:      Rate and Rhythm: Normal rate and regular rhythm.      Heart sounds: Normal heart sounds. No murmur heard.    No friction rub.   Pulmonary:      Effort: Pulmonary effort is normal. No respiratory distress.      Breath sounds: Normal breath sounds. No wheezing or rales.   Abdominal:      General: Bowel sounds are normal. There is no distension.      Palpations: Abdomen is soft. There is no " mass.      Tenderness: There is no abdominal tenderness. There is no guarding or rebound.   Musculoskeletal:      Right lower leg: No edema.      Left lower leg: No edema.   Skin:     General: Skin is warm and dry.   Neurological:      General: No focal deficit present.      Mental Status: He is alert.   Psychiatric:         Mood and Affect: Mood normal.         Speech: Speech normal.         Behavior: Behavior normal.     Few scattered bruises upper extremity.    Result Review :                     Assessment and Plan    Diagnoses and all orders for this visit:    1. Essential hypertension (Primary)  -     CBC & Differential  -     Comprehensive Metabolic Panel    2. Need for influenza vaccination  -     Fluzone High-Dose 65+yrs (3831-2258)    3. Mixed hyperlipidemia  -     Comprehensive Metabolic Panel  -     Lipid Panel With / Chol / HDL Ratio    4. B12 deficiency  -     Vitamin B12  -     Methylmalonic Acid, Serum    5. Hypomagnesemia  -     Magnesium    6. Tremor    7. Bruising  -     CBC & Differential  -     Protime-INR  -     APTT    8. Allergic rhinitis, unspecified seasonality, unspecified trigger          DISCUSSION    Hypertension.  Blood pressure is stable.  Occasionally elevated but overall stable.  Continue losartan 50 mg twice a day.  Check CMP and CBC today.    Flu shot today.    Hyperlipidemia.  Check CMP and lipid panel.    B12 deficiency.  Check B12 level.  He is wanting to get off B12 vitamin.    History of low magnesium.  Recheck that today.    Tremor.  Appears to be an essential tremor.  There is no tremor at rest on examination.  Call if worsening.  He does not want to take any medication at this time.  It is mild.    Bruising with easy bleeding.  We will check CBC, PT and PTT.    Allergic rhinitis.  He reports that Allegra caused mental dullness and once he is off of that he feels much better although he is still having allergy issues.  Recommend that he take the Allegra just as needed if he  has a particularly bad day.        Follow Up   Return in about 6 months (around 4/27/2023).    Patient was given instructions and counseling regarding his condition or for health maintenance advice. Please see specific information pulled into the AVS if appropriate.       Brent Botello MD   Transcribed from ambient dictation for Brent Botello MD by Susannah Contreras.  10/27/22   09:44 EDT    Patient or patient representative verbalized consent to the visit recording.  I have personally performed the services described in this document as transcribed by the above individual, and it is both accurate and complete.  Brent Botello MD  10/27/2022  14:43 EDT

## 2022-11-02 LAB
ALBUMIN SERPL-MCNC: 3.9 G/DL (ref 3.8–4.8)
ALBUMIN/GLOB SERPL: 2.1 {RATIO} (ref 1.2–2.2)
ALP SERPL-CCNC: 80 IU/L (ref 44–121)
ALT SERPL-CCNC: 11 IU/L (ref 0–44)
APTT PPP: 27 SEC (ref 24–33)
AST SERPL-CCNC: 18 IU/L (ref 0–40)
BASOPHILS # BLD AUTO: 0 X10E3/UL (ref 0–0.2)
BASOPHILS NFR BLD AUTO: 1 %
BILIRUB SERPL-MCNC: 0.5 MG/DL (ref 0–1.2)
BUN SERPL-MCNC: 14 MG/DL (ref 8–27)
BUN/CREAT SERPL: 13 (ref 10–24)
CALCIUM SERPL-MCNC: 8.8 MG/DL (ref 8.6–10.2)
CHLORIDE SERPL-SCNC: 105 MMOL/L (ref 96–106)
CHOLEST SERPL-MCNC: 146 MG/DL (ref 100–199)
CHOLEST/HDLC SERPL: 2.9 RATIO (ref 0–5)
CO2 SERPL-SCNC: 27 MMOL/L (ref 20–29)
CREAT SERPL-MCNC: 1.06 MG/DL (ref 0.76–1.27)
EGFRCR SERPLBLD CKD-EPI 2021: 76 ML/MIN/1.73
EOSINOPHIL # BLD AUTO: 0.2 X10E3/UL (ref 0–0.4)
EOSINOPHIL NFR BLD AUTO: 4 %
ERYTHROCYTE [DISTWIDTH] IN BLOOD BY AUTOMATED COUNT: 11.8 % (ref 11.6–15.4)
GLOBULIN SER CALC-MCNC: 1.9 G/DL (ref 1.5–4.5)
GLUCOSE SERPL-MCNC: 93 MG/DL (ref 70–99)
HCT VFR BLD AUTO: 42.3 % (ref 37.5–51)
HDLC SERPL-MCNC: 51 MG/DL
HGB BLD-MCNC: 13.7 G/DL (ref 13–17.7)
IMM GRANULOCYTES # BLD AUTO: 0 X10E3/UL (ref 0–0.1)
IMM GRANULOCYTES NFR BLD AUTO: 0 %
INR PPP: 1 (ref 0.9–1.2)
LDLC SERPL CALC-MCNC: 84 MG/DL (ref 0–99)
LYMPHOCYTES # BLD AUTO: 1 X10E3/UL (ref 0.7–3.1)
LYMPHOCYTES NFR BLD AUTO: 22 %
MAGNESIUM SERPL-MCNC: 1.9 MG/DL (ref 1.6–2.3)
MCH RBC QN AUTO: 30.9 PG (ref 26.6–33)
MCHC RBC AUTO-ENTMCNC: 32.4 G/DL (ref 31.5–35.7)
MCV RBC AUTO: 95 FL (ref 79–97)
METHYLMALONATE SERPL-SCNC: 440 NMOL/L (ref 0–378)
MONOCYTES # BLD AUTO: 0.4 X10E3/UL (ref 0.1–0.9)
MONOCYTES NFR BLD AUTO: 9 %
NEUTROPHILS # BLD AUTO: 2.8 X10E3/UL (ref 1.4–7)
NEUTROPHILS NFR BLD AUTO: 64 %
PLATELET # BLD AUTO: 191 X10E3/UL (ref 150–450)
POTASSIUM SERPL-SCNC: 4.6 MMOL/L (ref 3.5–5.2)
PROT SERPL-MCNC: 5.8 G/DL (ref 6–8.5)
PROTHROMBIN TIME: 10.6 SEC (ref 9.1–12)
RBC # BLD AUTO: 4.44 X10E6/UL (ref 4.14–5.8)
SODIUM SERPL-SCNC: 143 MMOL/L (ref 134–144)
TRIGL SERPL-MCNC: 49 MG/DL (ref 0–149)
VIT B12 SERPL-MCNC: 821 PG/ML (ref 232–1245)
VLDLC SERPL CALC-MCNC: 11 MG/DL (ref 5–40)
WBC # BLD AUTO: 4.4 X10E3/UL (ref 3.4–10.8)

## 2022-11-15 ENCOUNTER — OFFICE VISIT (OUTPATIENT)
Dept: CARDIOLOGY | Facility: CLINIC | Age: 67
End: 2022-11-15

## 2022-11-15 VITALS
DIASTOLIC BLOOD PRESSURE: 86 MMHG | WEIGHT: 250 LBS | HEIGHT: 74 IN | SYSTOLIC BLOOD PRESSURE: 146 MMHG | HEART RATE: 73 BPM | OXYGEN SATURATION: 97 % | BODY MASS INDEX: 32.08 KG/M2

## 2022-11-15 DIAGNOSIS — Z95.0 PACEMAKER: Primary | ICD-10-CM

## 2022-11-15 DIAGNOSIS — I44.2 COMPLETE HEART BLOCK: ICD-10-CM

## 2022-11-15 DIAGNOSIS — Z95.3 HISTORY OF AORTIC VALVE REPLACEMENT WITH BIOPROSTHETIC VALVE: ICD-10-CM

## 2022-11-15 DIAGNOSIS — I10 PRIMARY HYPERTENSION: Chronic | ICD-10-CM

## 2022-11-15 PROCEDURE — 99214 OFFICE O/P EST MOD 30 MIN: CPT | Performed by: STUDENT IN AN ORGANIZED HEALTH CARE EDUCATION/TRAINING PROGRAM

## 2022-11-15 PROCEDURE — 93280 PM DEVICE PROGR EVAL DUAL: CPT | Performed by: STUDENT IN AN ORGANIZED HEALTH CARE EDUCATION/TRAINING PROGRAM

## 2022-11-15 RX ORDER — MAGNESIUM 200 MG
TABLET ORAL EVERY OTHER DAY
COMMUNITY

## 2022-11-15 NOTE — PROGRESS NOTES
Cardiac Electrophysiology Outpatient Note  [facility] at [facility]    Office Visit     Andrae Garza Jr.  [id number]  11/15/2022    Primary Care Physician: Brent Botello MD    Referred By: No ref. provider found    Subjective     Chief Complaint   Patient presents with   • Syncope and collapse       History of Present Illness:   Andrae Garza Jr. is a 67 y.o. male who presents to my electrophysiology clinic for follow up of paroxysmal AV block status post Medtronic dual-chamber pacemaker.  He was initially having syncopal events and saw Dr. Alcala and had a loop recorder placed.  With this he was found to have a 9-second period of paroxysmal AV block.  He therefore underwent pacemaker plantation.  We initially attempted to place a left bundle branch pacing lead.  Unfortunately we had a late dislodgment resulting in diaphragmatic stimulation.  He underwent RV lead revision with a standard RV lead.  He did well with this but did have a hematoma post procedure.  He is recovered well from this and overall has not any other issues.  Insert he denies.  He is noted a little bit of a depression over his pacemaker site that he was worried about.  Past Medical History:   Diagnosis Date   • Abnormal ECG    • Aortic valve replaced    • Arrhythmia    • Arthritis    • Cancer (HCC) 2008    Kidney   • Congenital heart disease    • Hyperlipidemia    • Hypertension    • Renal cell adenoma of right kidney 2007   • Sleep apnea     Need tocheck again       Past Surgical History:   Procedure Laterality Date   • AORTIC VALVE REPAIR/REPLACEMENT  2008   • CARDIAC CATHETERIZATION N/A 02/16/2022    Procedure: Left Heart Cath;  Surgeon: Solomon Mclean MD;  Location:  [facility] CATH INVASIVE LOCATION;  Service: Cardiovascular;  Laterality: N/A;   • CARDIAC ELECTROPHYSIOLOGY PROCEDURE N/A 02/16/2022    Procedure: Loop insertion;  Surgeon: Solomon Mclean MD;  Location: [facility] CATH INVASIVE LOCATION;   Service: Cardiovascular;  Laterality: N/A;   • CARDIAC ELECTROPHYSIOLOGY PROCEDURE N/A 08/03/2022    Procedure: Leadless ppm (MDT) vs. 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   • TISSUE AORTIC VALVE REPLACEMENT  01/25/2008    Pig Valve       Family History   Problem Relation Age of Onset   • Hypertension Mother    • Early death Father 56   • Heart disease Father        Social History     Socioeconomic History   • Marital status:    Tobacco Use   • Smoking status: Every Day     Packs/day: 1.00     Years: 45.00     Pack years: 45.00     Types: Cigarettes   • Smokeless tobacco: Never   Vaping Use   • Vaping Use: Never used   Substance and Sexual Activity   • Alcohol use: Yes     Alcohol/week: 1.0 standard drink     Types: 1 Shots of liquor per week     Comment: 1 daily   • Drug use: No   • Sexual activity: Yes     Partners: Female         Current Outpatient Medications:   •  losartan (COZAAR) 50 MG tablet, Take 1 tablet by mouth 2 (Two) Times a Day., Disp: 180 tablet, Rfl: 3  •  Magnesium 200 MG tablet, Every Other Day., Disp: , Rfl:   •  multivitamin with minerals tablet tablet, Take 1 tablet by mouth Daily., Disp: , Rfl:   •  tamsulosin (Flomax) 0.4 MG capsule 24 hr capsule, Take 1 capsule by mouth Daily., Disp: 90 capsule, Rfl: 1  •  vitamin B-12 (CYANOCOBALAMIN) 1000 MCG tablet, Take 1,000 mcg by mouth Daily., Disp: , Rfl:   •  buPROPion SR (Wellbutrin SR) 150 MG 12 hr tablet, One po daily for 1 week then one po  "BID, Disp: 60 tablet, Rfl: 5    Allergies:   Allergies   Allergen Reactions   • Norvasc [Amlodipine Besylate] Cough   • Lisinopril Cough       Objective   Vital Signs: Blood pressure 146/86, pulse 73, height 188 cm (74\"), weight 113 kg (250 lb), SpO2 97 %.    PHYSICAL EXAM  General appearance: Awake, alert, cooperative  Head: Normocephalic, without obvious abnormality, atraumatic  Neck: No JVD  Lungs: Clear to ascultation bilaterally  Heart: Regular rate and rhythm, no murmurs, 2+ LE pulses, no lower extremity swelling  Skin: Skin color, turgor normal, no rashes or lesions  Neurologic: Grossly normal     Lab Results   Component Value Date    GLUCOSE 93 10/27/2022    CALCIUM 8.8 10/27/2022     10/27/2022    K 4.6 10/27/2022    CO2 27 10/27/2022     10/27/2022    BUN 14 10/27/2022    CREATININE 1.06 10/27/2022    EGFRIFAFRI 67 08/30/2021    EGFRIFNONA 58 (L) 02/17/2022    BCR 13 10/27/2022    ANIONGAP 9.0 08/20/2022     Lab Results   Component Value Date    WBC 4.4 10/27/2022    HGB 13.7 10/27/2022    HCT 42.3 10/27/2022    MCV 95 10/27/2022     10/27/2022     Lab Results   Component Value Date    INR 1.0 10/27/2022     Lab Results   Component Value Date    TSH 2.099 12/07/2015          Results for orders placed during the hospital encounter of 02/15/22    Adult Transthoracic Echo Complete W/ Cont if Necessary Per Protocol    Interpretation Summary  · There is a bioprosthetic aortic valve present with acceptable hemodynamics and leaflet excursion with suboptimal visualization.  · Estimated right ventricular systolic pressure from tricuspid regurgitation is normal (<35 mmHg).  · Moderate dilation of the aortic root and of the sinuses of Valsalva present.  · The following left ventricular wall segments are hypokinetic: mid anterior, apical anterior, apical septal and apex hypokinetic.  · The left ventricular cavity is borderline dilated.  · Estimated left ventricular EF = 50% Estimated left " ventricular EF was in agreement with the calculated left ventricular EF. Left ventricular ejection fraction appears to be 46 - 50%. Left ventricular systolic function is low normal.  · Left ventricular diastolic function is consistent with (grade I) impaired relaxation.  · Normal right ventricular cavity size and wall thickness noted with moderately reduced right ventricular systolic function; abnormal septal motion consistent with bundle branch block.  · No evidence of pulmonary hypertension is present.  · There is no evidence of pericardial effusion.  · Technically difficult and limited study.     Results for orders placed during the hospital encounter of 02/15/22    Cardiac Catheterization/Vascular Study    Narrative  · Normal coronary arteries  · LVEF 50 to 55%  · Mildly dilated aortic root    Andrae Garza Jr.  reports that he has been smoking cigarettes. He has a 45.00 pack-year smoking history. He has never used smokeless tobacco..    Assessment & Plan    1. Pacemaker  His Medtronic dual-chamber pacemaker was interrogated is functioning well.  He is approximately 15 years of battery life remaining.  He is pacing 5% of time the atrium less than 1% time in the ventricle.  He had a few short episodes of atrial tachycardia.  No other events.  No changes were made to her settings.    2. Complete heart block (HCC)  He has a history of paroxysmal AV block status post pacemaker as above.  We will continue to monitor.    3. Primary hypertension  Has history of hypertension.  Today's blood pressure is elevated.  He is taking losartan 50 mg twice daily.  He previously was on Acticor thiazide and nadolol but he was stopped due to syncopal events.  He is on check a blood pressure log at home and send it to us to check his blood pressure.    4. History of aortic valve replacement with bioprosthetic valve  Is a history of bioprosthetic aortic valve replacement approximately 15 years ago.  Most recent echo shows mild  regurgitation.  We will continue to monitor.       Follow Up:  Return in about 1 year (around 11/15/2023) for Recheck.      Thank you for allowing me to participate in the care of your patient. Please do not hesitate to contact me with additional questions or concerns.      Jerad Navarro M.D.  Cardiac Electrophysiologist  [facility]

## 2022-12-15 ENCOUNTER — TELEPHONE (OUTPATIENT)
Dept: CARDIOLOGY | Facility: CLINIC | Age: 67
End: 2022-12-15

## 2022-12-15 RX ORDER — AMLODIPINE BESYLATE 5 MG/1
5 TABLET ORAL DAILY
Qty: 30 TABLET | Refills: 11 | Status: SHIPPED | OUTPATIENT
Start: 2022-12-15

## 2022-12-15 NOTE — TELEPHONE ENCOUNTER
BP update -   Losartan 50 mg twice daily    After AM meds  137/82   153/98  148/100  158/100  139/96  141/104    Before PM losartan  148/97  163/104  157/92  140/95    AM prior to meds  166/106  151/102    Per JKC - add amlodipine 5 mg daily. Pt aware and agreeable.  Pt denies allergy or intolerance to amlodipine - previously tried HCTZ and nadolol. Willing to try amlodipine. He will update in 1-2 weeks with BP readings.

## 2022-12-17 PROCEDURE — 93296 REM INTERROG EVL PM/IDS: CPT | Performed by: STUDENT IN AN ORGANIZED HEALTH CARE EDUCATION/TRAINING PROGRAM

## 2022-12-17 PROCEDURE — 93294 REM INTERROG EVL PM/LDLS PM: CPT | Performed by: STUDENT IN AN ORGANIZED HEALTH CARE EDUCATION/TRAINING PROGRAM

## 2023-02-23 ENCOUNTER — OFFICE VISIT (OUTPATIENT)
Dept: CARDIOLOGY | Facility: CLINIC | Age: 68
End: 2023-02-23
Payer: MEDICARE

## 2023-02-23 VITALS
BODY MASS INDEX: 32.08 KG/M2 | OXYGEN SATURATION: 96 % | DIASTOLIC BLOOD PRESSURE: 90 MMHG | WEIGHT: 250 LBS | HEIGHT: 74 IN | HEART RATE: 81 BPM | SYSTOLIC BLOOD PRESSURE: 130 MMHG

## 2023-02-23 DIAGNOSIS — I35.0 NONRHEUMATIC AORTIC VALVE STENOSIS: Primary | ICD-10-CM

## 2023-02-23 DIAGNOSIS — I44.2 AV BLOCK, COMPLETE: ICD-10-CM

## 2023-02-23 DIAGNOSIS — I10 PRIMARY HYPERTENSION: ICD-10-CM

## 2023-02-23 DIAGNOSIS — E78.2 MIXED HYPERLIPIDEMIA: ICD-10-CM

## 2023-02-23 PROCEDURE — 99214 OFFICE O/P EST MOD 30 MIN: CPT | Performed by: INTERNAL MEDICINE

## 2023-02-23 NOTE — PROGRESS NOTES
"[facility]  Office Progress Note  Andrae Garza .  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 CAD  1. 10/21 CT chest revealing coronary calcifications.  2. Syncope and collapse  a. 9/21 Syncopal episode felt to be due to orthostatic hypotension  with negative EEG-data deficit  b.  2/15/2022 [facility] ED via EMS, CPR initiated at home prior to EMS arrival, patient combative initially but back to baseline [facility] ED without apparent EMS findings of arrhythmia/hypotension                          2/22 [facility] nl cors EF 50%  c. 2/22 [employer] loop recorder implanted - Dr Vinay mcghee. 8/22 MDT PPM w then lead revision - Dr Navarro  2. Bicuspid aortic valve   a. Status post bioprosthetic aortic valve replacement January 2008 at [facility](preop CT w renal mass, cholelithiasis subsequent nephrectomy/ CCX)  b.   2/22 echo EF 50%, nl AVR fxn                   3. Carotid artery disease  1. 2/22 carotid US: Proximal right ICA artery normal.  Proximal left ICA artery plaque without significant stenosis.  LICA stenosis of 0-49%.  Antegrade vertebrals bilaterally.  4. LBBB  5. Hypertension  6. Hyperlipidemia  1. 10/22 146/49/51/84  7. Obstructive sleep apnea, noncompliant with CPAP-controlled with \"sleep shirt\" with tennis ball in the center  8. Current tobacco use; <1 pack/day x 40 years, failed chantix, on wellbutrin   8. History of renal cell carcinoma, status post nephrectomy Dr Lomeli  9. Surgical history:  a. Cholecystectomy 2008  b. AVR 2008  c. Nephrectomy 2008      CC:   Chief Complaint   Patient presents with   • Nonrheumatic aortic valve stenosis       Allergies  Allergies   Allergen Reactions   • Lisinopril Cough       Current Medications    Current Outpatient " "Medications:   •  amLODIPine (NORVASC) 5 MG tablet, Take 1 tablet by mouth Daily., Disp: 30 tablet, Rfl: 11  •  losartan (COZAAR) 50 MG tablet, Take 1 tablet by mouth 2 (Two) Times a Day., Disp: 180 tablet, Rfl: 3  •  Magnesium 200 MG tablet, Every Other Day., Disp: , Rfl:   •  multivitamin with minerals tablet tablet, Take 1 tablet by mouth Daily., Disp: , Rfl:   •  tamsulosin (Flomax) 0.4 MG capsule 24 hr capsule, Take 1 capsule by mouth Daily., Disp: 90 capsule, Rfl: 1  •  vitamin B-12 (CYANOCOBALAMIN) 1000 MCG tablet, Take 1,000 mcg by mouth Daily., Disp: , Rfl:       History of Present Illness   Andrae Garza Jr. is a 67 y.o. year old male here for follow up.  He has a litany of anecdotes regarding his hospitalization with pacemaker implantation and revision.  States he is largely felt well his blood pressure is much more stable with mild diastolic hypertension  He notes no chest pain nor dyspnea        OBJECTIVE:  Vitals:    02/23/23 0923   BP: 130/90   BP Location: Right arm   Patient Position: Sitting   Pulse: 81   SpO2: 96%   Weight: 113 kg (250 lb)   Height: 188 cm (74.02\")     Body mass index is 32.08 kg/m².    Constitutional:       Appearance: Healthy appearance. Not in distress.   Neck:      Vascular: No JVR. JVD normal.   Pulmonary:      Effort: Pulmonary effort is normal.      Breath sounds: Normal breath sounds. No wheezing. No rhonchi. No rales.   Chest:      Chest wall: Not tender to palpatation.   Cardiovascular:      PMI at left midclavicular line. Normal rate. Regular rhythm. Normal S1. Normal S2.      Murmurs: There is a systolic murmur.      No gallop. No click. No rub.   Pulses:     Intact distal pulses.   Edema:     Peripheral edema absent.   Abdominal:      General: Bowel sounds are normal.      Palpations: Abdomen is soft.      Tenderness: There is no abdominal tenderness.   Musculoskeletal: Normal range of motion.         General: No tenderness. Skin:     General: Skin is warm and dry. "   Neurological:      General: No focal deficit present.      Mental Status: Alert and oriented to person, place and time.         Diagnostic Data:  Procedures      ASSESSMENT:   Diagnosis Plan   1. Nonrheumatic aortic valve stenosis        2. AV block, complete (HCC)        3. Primary hypertension        4. Mixed hyperlipidemia            PLAN:  Syncope- post ppm.  Will transition device checks to [place] for geographic convenience    Bicuspid Av post remote AVR- fu echo 2024    Hypertension controlled nadolol losartan          Gaston Alcala MD, FACC

## 2023-05-30 ENCOUNTER — TELEPHONE (OUTPATIENT)
Dept: FAMILY MEDICINE CLINIC | Facility: CLINIC | Age: 68
End: 2023-05-30

## 2023-05-30 ENCOUNTER — TELEPHONE (OUTPATIENT)
Dept: CARDIOLOGY | Facility: CLINIC | Age: 68
End: 2023-05-30

## 2023-05-30 DIAGNOSIS — I10 PRIMARY HYPERTENSION: Primary | Chronic | ICD-10-CM

## 2023-05-30 DIAGNOSIS — R55 SYNCOPE AND COLLAPSE: ICD-10-CM

## 2023-05-30 NOTE — TELEPHONE ENCOUNTER
"    Caller: Andrae Garza Jr. \"Srikanth\"    Relationship: Self    Best call back number: [phone]    What orders are you requesting (i.e. lab or imaging):   FASTING LAB ORDER     In what timeframe would the patient need to come in: 05/31/2023    Where will you receive your lab/imaging services:   IN OFFICE     Additional notes:   PATIENT WOULD LIKE A CALL BACK REGARDING IF DR SARA BENTLEY WOULD LIKE FOR FASTING LAB ORDERS BE PLACED TO HAVE DRAWN OTHER THAN THE CBC AND COMPREHENSIVE METABOLIC PANEL THAT WHERE ALREADY PLACED BY DR JONES TO HAVE DONE. PATIENT WOULD LIKE TO HAVE ALL FASTING LABS DONE IN ONE DAY     "

## 2023-05-30 NOTE — TELEPHONE ENCOUNTER
My chart message  Dr Alcala,  I had another incident of passing out while on the tractor. Martine was with me and said it appeared I wasn’t breathing. She gave me chest compressions as good as she was able to. I got back to the house and felt ok later. This is exactly the same incidents I had before the pacemaker was placed in August.  I did not go to ER due to holiday weekend…..  We have routine check up on June 1 with Brent Botello. Will you be at [place] that day? Can you squeeze me in for an appt, I do not know what to do going forward.  Thank you   Srikanth Garza    Mr Garza called to report above.  Had him send in a remote reading.  He has had short SVT episodes under 5 sec long.  Last event 5/23/23.  Not the day of syncope event.  He said he thinks he was well hydrated.

## 2023-05-30 NOTE — TELEPHONE ENCOUNTER
Per JOSELIN -   Have him get cbc cmp prior to seeing his pcp 6/1   Have him monitor bps   Device check - nothing to result in syncope      Spoke with pt and wife - they had returned from vacation travelled by train from [place] to [place] and also prolonged car rides.    Episode of syncope occurred on Friday while on his tractor. He did not fall or suffer injury. Wife was behind him on the tractor and did CPR while he was seated until he came to. He did not seek ER care. Wife reports this episode was identical to episode in February.     Pt and wife aware nothing on PPM interrogation reveals cause for syncope. Labs ordered for pt to have drawn prior to FU with Dr. Botello on 6/1.    Update 6/2/2023  Reviewed PCP note and discussed with JOSELIN - will check echo now but agree with neurology consult.   Pt aware and agreeable. Message to scheduling to schedule echo ASAP

## 2023-05-30 NOTE — TELEPHONE ENCOUNTER
Please call.  I think that is all he would need to have done.  Cholesterol was done in October and was perfect.  CBC and the CMP is fine.

## 2023-05-31 ENCOUNTER — LAB (OUTPATIENT)
Dept: LAB | Facility: HOSPITAL | Age: 68
End: 2023-05-31

## 2023-05-31 DIAGNOSIS — R55 SYNCOPE AND COLLAPSE: ICD-10-CM

## 2023-05-31 PROCEDURE — 36415 COLL VENOUS BLD VENIPUNCTURE: CPT

## 2023-05-31 PROCEDURE — 85025 COMPLETE CBC W/AUTO DIFF WBC: CPT

## 2023-05-31 PROCEDURE — 80053 COMPREHEN METABOLIC PANEL: CPT

## 2023-06-01 ENCOUNTER — OFFICE VISIT (OUTPATIENT)
Dept: FAMILY MEDICINE CLINIC | Facility: CLINIC | Age: 68
End: 2023-06-01

## 2023-06-01 VITALS
WEIGHT: 238 LBS | HEIGHT: 74 IN | SYSTOLIC BLOOD PRESSURE: 120 MMHG | DIASTOLIC BLOOD PRESSURE: 82 MMHG | BODY MASS INDEX: 30.54 KG/M2 | HEART RATE: 70 BPM | RESPIRATION RATE: 18 BRPM | TEMPERATURE: 96.9 F

## 2023-06-01 DIAGNOSIS — Z12.2 ENCOUNTER FOR SCREENING FOR LUNG CANCER: ICD-10-CM

## 2023-06-01 DIAGNOSIS — Z00.00 MEDICARE ANNUAL WELLNESS VISIT, SUBSEQUENT: Primary | ICD-10-CM

## 2023-06-01 DIAGNOSIS — R55 SYNCOPE, UNSPECIFIED SYNCOPE TYPE: ICD-10-CM

## 2023-06-01 DIAGNOSIS — E78.2 MIXED HYPERLIPIDEMIA: Chronic | ICD-10-CM

## 2023-06-01 DIAGNOSIS — F17.210 NICOTINE DEPENDENCE, CIGARETTES, UNCOMPLICATED: ICD-10-CM

## 2023-06-01 LAB
ALBUMIN SERPL-MCNC: 4 G/DL (ref 3.5–5.2)
ALBUMIN/GLOB SERPL: 1.9 G/DL
ALP SERPL-CCNC: 85 U/L (ref 39–117)
ALT SERPL W P-5'-P-CCNC: 23 U/L (ref 1–41)
ANION GAP SERPL CALCULATED.3IONS-SCNC: 10 MMOL/L (ref 5–15)
AST SERPL-CCNC: 27 U/L (ref 1–40)
BASOPHILS # BLD AUTO: 0.03 10*3/MM3 (ref 0–0.2)
BASOPHILS NFR BLD AUTO: 0.6 % (ref 0–1.5)
BILIRUB SERPL-MCNC: 0.5 MG/DL (ref 0–1.2)
BUN SERPL-MCNC: 17 MG/DL (ref 8–23)
BUN/CREAT SERPL: 13.3 (ref 7–25)
CALCIUM SPEC-SCNC: 9 MG/DL (ref 8.6–10.5)
CHLORIDE SERPL-SCNC: 104 MMOL/L (ref 98–107)
CHOLEST SERPL-MCNC: 155 MG/DL (ref 0–200)
CHOLEST/HDLC SERPL: 4.43 {RATIO}
CO2 SERPL-SCNC: 28 MMOL/L (ref 22–29)
CREAT SERPL-MCNC: 1.28 MG/DL (ref 0.76–1.27)
DEPRECATED RDW RBC AUTO: 43.3 FL (ref 37–54)
EGFRCR SERPLBLD CKD-EPI 2021: 61 ML/MIN/1.73
EOSINOPHIL # BLD AUTO: 0.15 10*3/MM3 (ref 0–0.4)
EOSINOPHIL NFR BLD AUTO: 2.9 % (ref 0.3–6.2)
ERYTHROCYTE [DISTWIDTH] IN BLOOD BY AUTOMATED COUNT: 13.3 % (ref 12.3–15.4)
GLOBULIN UR ELPH-MCNC: 2.1 GM/DL
GLUCOSE SERPL-MCNC: 111 MG/DL (ref 65–99)
HCT VFR BLD AUTO: 41.7 % (ref 37.5–51)
HDLC SERPL-MCNC: 35 MG/DL (ref 40–60)
HGB BLD-MCNC: 14.1 G/DL (ref 13–17.7)
IMM GRANULOCYTES # BLD AUTO: 0.01 10*3/MM3 (ref 0–0.05)
IMM GRANULOCYTES NFR BLD AUTO: 0.2 % (ref 0–0.5)
LDLC SERPL CALC-MCNC: 101 MG/DL (ref 0–100)
LYMPHOCYTES # BLD AUTO: 1.19 10*3/MM3 (ref 0.7–3.1)
LYMPHOCYTES NFR BLD AUTO: 23.1 % (ref 19.6–45.3)
MAGNESIUM SERPL-MCNC: 1.6 MG/DL (ref 1.6–2.4)
MCH RBC QN AUTO: 29.9 PG (ref 26.6–33)
MCHC RBC AUTO-ENTMCNC: 33.8 G/DL (ref 31.5–35.7)
MCV RBC AUTO: 88.5 FL (ref 79–97)
MONOCYTES # BLD AUTO: 0.34 10*3/MM3 (ref 0.1–0.9)
MONOCYTES NFR BLD AUTO: 6.6 % (ref 5–12)
NEUTROPHILS NFR BLD AUTO: 3.43 10*3/MM3 (ref 1.7–7)
NEUTROPHILS NFR BLD AUTO: 66.6 % (ref 42.7–76)
NRBC BLD AUTO-RTO: 0.2 /100 WBC (ref 0–0.2)
PLATELET # BLD AUTO: 168 10*3/MM3 (ref 140–450)
PMV BLD AUTO: 10.8 FL (ref 6–12)
POTASSIUM SERPL-SCNC: 4.2 MMOL/L (ref 3.5–5.2)
PROT SERPL-MCNC: 6.1 G/DL (ref 6–8.5)
RBC # BLD AUTO: 4.71 10*6/MM3 (ref 4.14–5.8)
SODIUM SERPL-SCNC: 142 MMOL/L (ref 136–145)
TRIGL SERPL-MCNC: 102 MG/DL (ref 0–150)
VLDLC SERPL CALC-MCNC: 19 MG/DL (ref 5–40)
WBC NRBC COR # BLD: 5.15 10*3/MM3 (ref 3.4–10.8)

## 2023-06-01 NOTE — PROGRESS NOTES
The ABCs of the Annual Wellness Visit  Subsequent Medicare Wellness Visit    Subjective    Andrae Garza Jr. is a 67 y.o. male who presents for a Subsequent Medicare Wellness Visit.      Health maintenance   The patient has a dentist appointment next week. He does not always wear his seatbelt. He reports an increase in arthralgias in his hips, knees, shoulders, and other joints excluding his ankles. He was discontinued from aspirin, but takes Advil as needed because it helps. He wife notes that he bleeds easily. He has lost 12 pounds since 02/2023, but his wife believes it may be a loss of muscle mass. His average yearly weight was 225 pounds, but his weight was approximating to 250 pounds recently. He is not forgetful. He has smoked for 40 years. He is not expectorating blood, but coughs up phlegm every morning.    Vaccinations  The patient has not received the shingles vaccine. He has had 2 COVID-19 vaccines. He had COVID-19 in early 04/2023.     The following portions of the patient's history were reviewed and   updated as appropriate: allergies, current medications, past family history, past medical history, past social history, past surgical history and problem list.    Compared to one year ago, the patient feels his physical   health is the same.    Compared to one year ago, the patient feels his mental   health is the same.    Recent Hospitalizations:  This patient has had a [facility] admission record on file within the last 365 days.    Current Medical Providers:  Patient Care Team:  Brent Botello MD as PCP - General (Family Medicine)  George Lomeli MD as Consulting Physician (Urology)  Thee Abebe MD (Cardiology)  Gaston Alcala MD as Consulting Physician (Cardiology)  Jerad Navarro MD as Consulting Physician (Cardiology)    Outpatient Medications Prior to Visit   Medication Sig Dispense Refill   • amLODIPine (NORVASC) 5 MG tablet Take 1 tablet by mouth Daily. 30 tablet 11   •  "losartan (COZAAR) 50 MG tablet Take 1 tablet by mouth 2 (Two) Times a Day. 180 tablet 3   • multivitamin with minerals tablet tablet Take 1 tablet by mouth Daily.     • Magnesium 200 MG tablet Every Other Day.     • tamsulosin (Flomax) 0.4 MG capsule 24 hr capsule Take 1 capsule by mouth Daily. 90 capsule 1   • vitamin B-12 (CYANOCOBALAMIN) 1000 MCG tablet Take 1 tablet by mouth Daily.       No facility-administered medications prior to visit.       No opioid medication identified on active medication list. I have reviewed chart for other potential  high risk medication/s and harmful drug interactions in the elderly.          Aspirin is not on active medication list.  Aspirin use is not indicated based on review of current medical condition/s. Risk of harm outweighs potential benefits.  .    Patient Active Problem List   Diagnosis   • Benign prostatic hyperplasia with urinary obstruction   • Atopic rhinitis   • Hyperlipidemia   • Hypertension   • Renal cell carcinoma   • Cobalamin deficiency   • H/O aortic valve replacement with porcine valve   • Melanoma   • Syncope and collapse   • CHB (complete heart block)   • Cardiac standstill   • Complete heart block     Advance Care Planning   Advance Care Planning     Advance Directive is not on file.  ACP discussion was held with the patient during this visit. he has a living well     Objective    Vitals:    06/01/23 0852   BP: 120/82   Pulse: 70   Resp: 18   Temp: 96.9 °F (36.1 °C)   Weight: 108 kg (238 lb)   Height: 188 cm (74\")   PainSc: 0-No pain     Estimated body mass index is 30.56 kg/m² as calculated from the following:    Height as of this encounter: 188 cm (74\").    Weight as of this encounter: 108 kg (238 lb).    BMI is >= 30 and <35. (Class 1 Obesity). The following options were offered after discussion;: exercise counseling/recommendations and nutrition counseling/recommendations      Does the patient have evidence of cognitive impairment? No, recall things at " times and then can remember          HEALTH RISK ASSESSMENT    Smoking Status:  Social History     Tobacco Use   Smoking Status Every Day   • Packs/day: 1.00   • Years: 45.00   • Pack years: 45.00   • Types: Cigarettes   Smokeless Tobacco Never     Alcohol Consumption:  Social History     Substance and Sexual Activity   Alcohol Use Yes   • Alcohol/week: 1.0 standard drink   • Types: 1 Shots of liquor per week    Comment: 1 daily     Fall Risk Screen:    MARILOU Fall Risk Assessment was completed, and patient is at MODERATE risk for falls. Assessment completed on:2023    Depression Screenin/1/2023     9:04 AM   PHQ-2/PHQ-9 Depression Screening   Little Interest or Pleasure in Doing Things 0-->not at all   Feeling Down, Depressed or Hopeless 0-->not at all   PHQ-9: Brief Depression Severity Measure Score 0       Health Habits and Functional and Cognitive Screenin/1/2023     9:03 AM   Functional & Cognitive Status   Do you have difficulty preparing food and eating? No   Do you have difficulty bathing yourself, getting dressed or grooming yourself? No   Do you have difficulty using the toilet? No   Do you have difficulty moving around from place to place? No   Do you have trouble with steps or getting out of a bed or a chair? No   Current Diet Limited Junk Food   Dental Exam Up to date   Eye Exam Up to date   Exercise (times per week) 5 times per week   Current Exercises Include Walking   Do you need help using the phone?  No   Are you deaf or do you have serious difficulty hearing?  No   Do you need help with transportation? No   Do you need help shopping? No   Do you need help preparing meals?  No   Do you need help with housework?  No   Do you need help with laundry? No   Do you need help taking your medications? No   Do you need help managing money? No   Do you ever drive or ride in a car without wearing a seat belt? Yes   Have you felt unusual stress, anger or loneliness in the last month? No    Who do you live with? Spouse   If you need help, do you have trouble finding someone available to you? No   Have you been bothered in the last four weeks by sexual problems? No   Do you have difficulty concentrating, remembering or making decisions? No       Age-appropriate Screening Schedule:  Refer to the list below for future screening recommendations based on patient's age, sex and/or medical conditions. Orders for these recommended tests are listed in the plan section. The patient has been provided with a written plan.    Health Maintenance   Topic Date Due   • TDAP/TD VACCINES (1 - Tdap) Never done   • AAA SCREEN (ONE-TIME)  Never done   • LUNG CANCER SCREENING  10/21/2022   • COVID-19 Vaccine (3 - Booster for Suzette series) 07/11/2023 (Originally 1/28/2022)   • ZOSTER VACCINE (1 of 2) 06/01/2024 (Originally 7/31/2004)   • INFLUENZA VACCINE  08/01/2023   • LIPID PANEL  10/27/2023   • COLORECTAL CANCER SCREENING  01/07/2024   • ANNUAL WELLNESS VISIT  06/01/2024   • HEPATITIS C SCREENING  Completed   • Pneumococcal Vaccine 65+  Completed                  CMS Preventative Services Quick Reference  Risk Factors Identified During Encounter  Fall Risk-High or Moderate: history of syncope  Immunizations Discussed/Encouraged: Shingrix  Dental Screening Recommended  Vision Screening Recommended  The above risks/problems have been discussed with the patient.  Pertinent information has been shared with the patient in the After Visit Summary.  An After Visit Summary and PPPS were made available to the patient.    Follow Up:   Next Medicare Wellness visit to be scheduled in 1 year.       Additional E&M Note during same encounter follows:  Patient has multiple medical problems which are significant and separately identifiable that require additional work above and beyond the Medicare Wellness Visit.      Chief Complaint  Medicare Wellness-subsequent (Passed out recently. )    Subjective        HPI  Andrae Garza Jr.  "is also being seen today for a Medicare wellness examination. He is accompanied by his wife.    Syncopal episode  The patient's wife reports that syncope and dyspnea occurred Friday evening, 05/26/2023, and she called emergency services. She did chest compressions for a few minutes and he appeared to be breathing, but he felt cold. This occurred previously in 02/2022. The pacemaker was checked and looked normal, and on Tuesday, 05/30/2023, it did not show activity. His wife notes that the Dr. Alcala ordered blood work yesterday, 05/31/2023, and he was not fasting at that time. She states that dehydration was a concern. He has a pacemaker, which is set at 60 bpm. Prior to the patient going to the emergency room, his wife denies feeling a pulse until they returned to the house, at which time it was rapid. He denies feeling tachycardiac or dizzy on other occasions. The patient was asked to record his blood pressure readings. His wife reports that prior to this event, they were on vacation at high altitudes in [place], from 5,000 to 9,000 feet for 8 days. He denies having problems at this time. She states that he does not jerk when syncope occurs, but she is concerned that he shows signs of convulsive syncope that can be related to cardiac arrhythmias.     Family history  The patient's wife states that the patient's mother and his aunt had a history of seizures in their older years of unknown origin. His mother never woke up from a coma.                   Objective   Vital Signs:  /82   Pulse 70   Temp 96.9 °F (36.1 °C)   Resp 18   Ht 188 cm (74\")   Wt 108 kg (238 lb)   BMI 30.56 kg/m²     Physical Exam  Vitals and nursing note reviewed.   Constitutional:       General: He is not in acute distress.     Appearance: Normal appearance. He is well-developed. He is not ill-appearing.   HENT:      Head: Normocephalic and atraumatic.      Right Ear: Hearing, tympanic membrane, ear canal and external ear normal.      " Left Ear: Hearing, tympanic membrane, ear canal and external ear normal.      Nose: Nose normal. No congestion or rhinorrhea.      Mouth/Throat:      Mouth: Mucous membranes are moist.      Pharynx: No oropharyngeal exudate or posterior oropharyngeal erythema.   Eyes:      General: Lids are normal.      Conjunctiva/sclera: Conjunctivae normal.      Pupils: Pupils are equal, round, and reactive to light.   Neck:      Thyroid: No thyromegaly.   Cardiovascular:      Rate and Rhythm: Normal rate and regular rhythm.      Heart sounds: Normal heart sounds. No murmur heard.    No friction rub.   Pulmonary:      Effort: Pulmonary effort is normal. No respiratory distress.      Breath sounds: Normal breath sounds. No wheezing or rales.   Abdominal:      General: Bowel sounds are normal. There is no distension.      Palpations: Abdomen is soft. There is no mass.      Tenderness: There is no abdominal tenderness. There is no guarding or rebound.   Musculoskeletal:      Right lower leg: No edema.      Left lower leg: No edema.   Skin:     General: Skin is warm and dry.   Neurological:      General: No focal deficit present.      Mental Status: He is alert.   Psychiatric:         Mood and Affect: Mood normal.         Speech: Speech normal.         Behavior: Behavior normal.                    ECG 12 Lead    Date/Time: 6/1/2023 6:20 PM  Performed by: Brent Botello MD  Authorized by: Brent Botello MD   Comparison: compared with previous ECG from 8/22/2022  Similar to previous ECG  Rhythm: sinus rhythm  Rate: normal  BPM: 67  Conduction: left bundle branch block  ST Segments: ST segments normal  QRS axis: normal  Other findings: non-specific ST-T wave changes    Clinical impression: non-specific ECG           Laboratory results from 05/31/2023 were reviewed. His potassium was normal. His kidney function was not bad. His blood glucose was 111 mg/dL. His CBC was normal and did not indicate anemia.      Assessment and Plan    Diagnoses and all orders for this visit:    1. Medicare annual wellness visit, subsequent (Primary)    2. Syncope, unspecified syncope type  -     Ambulatory Referral to Neurology  -     Magnesium    3. Nicotine dependence, cigarettes, uncomplicated  -     CT Chest Low Dose Wo; Future    4. Encounter for screening for lung cancer  -     CT Chest Low Dose Wo; Future    5. Mixed hyperlipidemia  -     Lipid Panel With / Chol / HDL Ratio    Other orders  -     ECG 12 Lead    Medicare wellness examination  Continue routine health maintenance including routine dentistry, eye examination, safety seatbelt use, exercise, and proper nutrition.     He had syncopal episode recently. He had another one in 02/2022, and had a pacemaker placed at that time. His wife is concerned that possibly could be seizure activity, although he was not having any jerking at the time of the event, but maybe is a different kind of seizure. We will refer to a neurologist for evaluation. CBC and CMP were normal yesterday, 05/31/2023. We will check lipid panel and magnesium level today. We will send a message to Dr. Alcala, who is his cardiologist, as well, to notify of the event to see if he needs to be seen sooner. In the meantime, continue current medications. We will check EKG today as well.         Follow Up   Return in about 6 months (around 12/1/2023), or if symptoms worsen or fail to improve.  Patient was given instructions and counseling regarding his condition or for health maintenance advice. Please see specific information pulled into the AVS if appropriate.       Transcribed from ambient dictation for Brent Botello MD by Mary Edge.  06/01/23   11:41 EDT    Patient or patient representative verbalized consent to the visit recording.  I have personally performed the services described in this document as transcribed by the above individual, and it is both accurate and complete.  Brent Botello MD  6/1/2023  18:21 EDT

## 2023-06-05 ENCOUNTER — HOSPITAL ENCOUNTER (OUTPATIENT)
Dept: CARDIOLOGY | Facility: HOSPITAL | Age: 68
Discharge: HOME | End: 2023-06-05
Admitting: INTERNAL MEDICINE
Payer: MEDICARE

## 2023-06-05 DIAGNOSIS — I44.2 AV BLOCK, COMPLETE: ICD-10-CM

## 2023-06-05 PROCEDURE — 25010000002 SULFUR HEXAFLUORIDE MICROSPH 60.7-25 MG RECONSTITUTED SUSPENSION: Performed by: INTERNAL MEDICINE

## 2023-06-05 PROCEDURE — 93306 TTE W/DOPPLER COMPLETE: CPT

## 2023-06-05 PROCEDURE — 93306 TTE W/DOPPLER COMPLETE: CPT | Performed by: INTERNAL MEDICINE

## 2023-06-05 RX ADMIN — SULFUR HEXAFLUORIDE 3 ML: KIT at 13:54

## 2023-06-06 LAB
BH CV ECHO MEAS - AO MAX PG: 7.2 MMHG
BH CV ECHO MEAS - AO MEAN PG: 3.4 MMHG
BH CV ECHO MEAS - AO ROOT DIAM: 3.6 CM
BH CV ECHO MEAS - AO V2 MAX: 133.4 CM/SEC
BH CV ECHO MEAS - AO V2 VTI: 24.6 CM
BH CV ECHO MEAS - AVA(I,D): 2.45 CM2
BH CV ECHO MEAS - EDV(CUBED): 103.9 ML
BH CV ECHO MEAS - EDV(MOD-SP2): 153 ML
BH CV ECHO MEAS - EDV(MOD-SP4): 230 ML
BH CV ECHO MEAS - EF(MOD-BP): 44.5 %
BH CV ECHO MEAS - EF(MOD-SP2): 45.1 %
BH CV ECHO MEAS - EF(MOD-SP4): 41.3 %
BH CV ECHO MEAS - ESV(CUBED): 59.7 ML
BH CV ECHO MEAS - ESV(MOD-SP2): 84 ML
BH CV ECHO MEAS - ESV(MOD-SP4): 135 ML
BH CV ECHO MEAS - FS: 16.9 %
BH CV ECHO MEAS - IVS/LVPW: 0.85 CM
BH CV ECHO MEAS - IVSD: 1.15 CM
BH CV ECHO MEAS - LA DIMENSION: 3.4 CM
BH CV ECHO MEAS - LV DIASTOLIC VOL/BSA (35-75): 98.3 CM2
BH CV ECHO MEAS - LV MASS(C)D: 226 GRAMS
BH CV ECHO MEAS - LV MAX PG: 2.31 MMHG
BH CV ECHO MEAS - LV MEAN PG: 1.11 MMHG
BH CV ECHO MEAS - LV SYSTOLIC VOL/BSA (12-30): 57.7 CM2
BH CV ECHO MEAS - LV V1 MAX: 76 CM/SEC
BH CV ECHO MEAS - LV V1 VTI: 14.7 CM
BH CV ECHO MEAS - LVIDD: 4.7 CM
BH CV ECHO MEAS - LVIDS: 3.9 CM
BH CV ECHO MEAS - LVOT AREA: 4.1 CM2
BH CV ECHO MEAS - LVOT DIAM: 2.28 CM
BH CV ECHO MEAS - LVPWD: 1.36 CM
BH CV ECHO MEAS - MV A MAX VEL: 77 CM/SEC
BH CV ECHO MEAS - MV DEC SLOPE: 104.7 CM/SEC2
BH CV ECHO MEAS - MV E MAX VEL: 49.4 CM/SEC
BH CV ECHO MEAS - MV E/A: 0.64
BH CV ECHO MEAS - MV MAX PG: 3.5 MMHG
BH CV ECHO MEAS - MV MEAN PG: 1.38 MMHG
BH CV ECHO MEAS - MV P1/2T: 178.5 MSEC
BH CV ECHO MEAS - MV V2 VTI: 25.9 CM
BH CV ECHO MEAS - MVA(P1/2T): 1.23 CM2
BH CV ECHO MEAS - MVA(VTI): 2.32 CM2
BH CV ECHO MEAS - PA ACC SLOPE: 446 CM/SEC2
BH CV ECHO MEAS - PA ACC TIME: 0.12 SEC
BH CV ECHO MEAS - PA PR(ACCEL): 26.7 MMHG
BH CV ECHO MEAS - PI END-D VEL: 112.7 CM/SEC
BH CV ECHO MEAS - SI(MOD-SP2): 29.5 ML/M2
BH CV ECHO MEAS - SI(MOD-SP4): 40.6 ML/M2
BH CV ECHO MEAS - SV(LVOT): 60.2 ML
BH CV ECHO MEAS - SV(MOD-SP2): 69 ML
BH CV ECHO MEAS - SV(MOD-SP4): 95 ML
BH CV ECHO MEAS - TAPSE (>1.6): 1.5 CM
BH CV XLRA - RV BASE: 3.5 CM
BH CV XLRA - RV LENGTH: 5.1 CM
BH CV XLRA - RV MID: 2.1 CM
BH CV XLRA - TDI S': 6.58 CM/SEC

## 2023-06-07 ENCOUNTER — TELEPHONE (OUTPATIENT)
Dept: CARDIOLOGY | Facility: CLINIC | Age: 68
End: 2023-06-07
Payer: MEDICARE

## 2023-06-07 NOTE — TELEPHONE ENCOUNTER
Per JKC -   Echo not significantly different than 2022   Have JWS review device reported from phone message, I cannot see it on my end only the message from our device personnel     Spoke with pt and wife, given results of echo. Pt has appt with neuro on 6/9 in [place] (Jennifer Botello, DO - neurology)  Message forwarded to JWS and EP nurse to review device transmission.

## 2023-06-15 ENCOUNTER — TELEPHONE (OUTPATIENT)
Dept: CARDIOLOGY | Facility: CLINIC | Age: 68
End: 2023-06-15
Payer: MEDICARE

## 2023-06-15 NOTE — TELEPHONE ENCOUNTER
"Patient called to report that over the past two days his BP has been averaging 110/79 about mid-afternoon. Patient states that he believes this could be contributing to his \"black-outs\". Patient advised to keep BP log over the weekend, about 2 hours after morning Losartan dose. Patient to record and call office next week.      Of note- patient take Losartan 50 mg twice daily.  And Amlodipine in evenings.       "

## 2023-06-16 ENCOUNTER — HOSPITAL ENCOUNTER (OUTPATIENT)
Dept: CT IMAGING | Facility: HOSPITAL | Age: 68
Discharge: HOME | End: 2023-06-16
Payer: MEDICARE

## 2023-06-16 DIAGNOSIS — Z12.2 ENCOUNTER FOR SCREENING FOR LUNG CANCER: ICD-10-CM

## 2023-06-16 DIAGNOSIS — F17.210 NICOTINE DEPENDENCE, CIGARETTES, UNCOMPLICATED: ICD-10-CM

## 2023-06-16 PROCEDURE — 71271 CT THORAX LUNG CANCER SCR C-: CPT

## 2023-09-11 ENCOUNTER — TRANSCRIBE ORDERS (OUTPATIENT)
Dept: ADMINISTRATIVE | Facility: HOSPITAL | Age: 68
End: 2023-09-11
Payer: MEDICARE

## 2023-09-11 DIAGNOSIS — G40.209 COMPLEX PARTIAL SEIZURES WITH CONSCIOUSNESS IMPAIRED: Primary | ICD-10-CM

## 2023-09-16 PROCEDURE — 93294 REM INTERROG EVL PM/LDLS PM: CPT | Performed by: INTERNAL MEDICINE

## 2023-09-16 PROCEDURE — 93296 REM INTERROG EVL PM/IDS: CPT | Performed by: INTERNAL MEDICINE

## 2023-09-25 RX ORDER — LOSARTAN POTASSIUM 50 MG/1
50 TABLET ORAL DAILY
Qty: 180 TABLET | Refills: 3 | Status: SHIPPED | OUTPATIENT
Start: 2023-09-25

## 2023-10-12 ENCOUNTER — TELEPHONE (OUTPATIENT)
Dept: CARDIOLOGY | Facility: CLINIC | Age: 68
End: 2023-10-12
Payer: MEDICARE

## 2023-10-12 NOTE — TELEPHONE ENCOUNTER
pt called -town this morning with blood pressure med questions. he has been taking his losartan twice daily and bp is running 140-145 over . wants to know if he can go back to taking old rx     Per JSAVAGE - we had changed his amlodipine to prn before he was diagnosed with seizures. He may return to taking amlodipine 5 plus losartan 50 BID    Pt aware and agreeable. Pt will keep appt 11/9/23

## 2023-10-26 ENCOUNTER — HOSPITAL ENCOUNTER (OUTPATIENT)
Dept: MRI IMAGING | Facility: HOSPITAL | Age: 68
Discharge: HOME | End: 2023-10-26
Admitting: PSYCHIATRY & NEUROLOGY
Payer: MEDICARE

## 2023-10-26 DIAGNOSIS — G40.209 COMPLEX PARTIAL SEIZURES WITH CONSCIOUSNESS IMPAIRED: ICD-10-CM

## 2023-10-26 PROCEDURE — 0 GADOBENATE DIMEGLUMINE 529 MG/ML SOLUTION: Performed by: PSYCHIATRY & NEUROLOGY

## 2023-10-26 PROCEDURE — A9577 INJ MULTIHANCE: HCPCS | Performed by: PSYCHIATRY & NEUROLOGY

## 2023-10-26 PROCEDURE — 82565 ASSAY OF CREATININE: CPT

## 2023-10-26 PROCEDURE — 70553 MRI BRAIN STEM W/O & W/DYE: CPT

## 2023-10-26 RX ADMIN — GADOBENATE DIMEGLUMINE 20 ML: 529 INJECTION, SOLUTION INTRAVENOUS at 15:07

## 2023-10-29 LAB — CREAT BLDA-MCNC: 1.5 MG/DL (ref 0.6–1.3)

## 2023-11-09 ENCOUNTER — OFFICE VISIT (OUTPATIENT)
Dept: CARDIOLOGY | Facility: CLINIC | Age: 68
End: 2023-11-09
Payer: MEDICARE

## 2023-11-09 ENCOUNTER — FLU SHOT (OUTPATIENT)
Dept: FAMILY MEDICINE CLINIC | Facility: CLINIC | Age: 68
End: 2023-11-09
Payer: MEDICARE

## 2023-11-09 VITALS
OXYGEN SATURATION: 96 % | SYSTOLIC BLOOD PRESSURE: 122 MMHG | WEIGHT: 239.5 LBS | BODY MASS INDEX: 31.74 KG/M2 | DIASTOLIC BLOOD PRESSURE: 78 MMHG | HEART RATE: 69 BPM | HEIGHT: 73 IN

## 2023-11-09 DIAGNOSIS — Z95.3 HISTORY OF AORTIC VALVE REPLACEMENT WITH BIOPROSTHETIC VALVE: ICD-10-CM

## 2023-11-09 DIAGNOSIS — I44.2 COMPLETE HEART BLOCK: ICD-10-CM

## 2023-11-09 DIAGNOSIS — R55 SYNCOPE AND COLLAPSE: Primary | ICD-10-CM

## 2023-11-09 DIAGNOSIS — Z23 NEED FOR INFLUENZA VACCINATION: Primary | ICD-10-CM

## 2023-11-09 RX ORDER — LEVETIRACETAM 750 MG/1
750 TABLET ORAL 2 TIMES DAILY
COMMUNITY
Start: 2023-11-02 | End: 2024-11-01

## 2023-11-09 NOTE — PROGRESS NOTES
"[facility] Cardiology  Office Progress Note  Andrae Garza Jr.  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  Syncope and collapse  9/21 Syncopal episode felt to be due to orthostatic hypotension  with negative EEG-data deficit   2/15/2022 [facility] ED via EMS, CPR initiated at home prior to EMS arrival, patient combative initially but back to baseline [facility] ED without apparent EMS findings of arrhythmia/hypotension                          2/22 [facility] nl cors EF 50%  2/22 [employer] scientific loop recorder implanted - Dr Mclean  8/22 MDT PPM w then lead revision - Dr Navarro  6/23 Echo EF 41-45%, grade I LV diastolic dysfunction, bioprosthetic aortic valve present with normal function  9/23 Dx w epilepsy Dr Brandt  Bicuspid aortic valve   Status post bioprosthetic aortic valve replacement January 2008 at [facility](preop CT w renal mass, cholelithiasis subsequent nephrectomy/ CCX)    2/22 echo EF 50%, nl AVR fxn     2/23 Echo EF 41-45%, grade I LV diastolic dysfunction, bioprosthetic AV present with normal function                Carotid artery disease  2/22 carotid US: Proximal right ICA artery normal.  Proximal left ICA artery plaque without significant stenosis.  LICA stenosis of 0-49%.  Antegrade vertebrals bilaterally.  LBBB  Hypertension  Hyperlipidemia  10/22 146/49/51/84  6/23 155/102/35/101  Obstructive sleep apnea, noncompliant with CPAP-controlled with \"sleep shirt\" with tennis ball in the center  Current tobacco use; <1 pack/day x 40 years, failed chantix, on wellbutrin   History of renal cell carcinoma, status post nephrectomy Dr Lomeli  Surgical history:  Cholecystectomy 2008  AVR 2008  Nephrectomy 2008        CC: " "  Chief Complaint   Patient presents with    Nonrheumatic aortic valve stenosis    Syncope and collapse       Allergies  Allergies   Allergen Reactions    Lisinopril Cough       Current Medications    Current Outpatient Medications:     amLODIPine (NORVASC) 5 MG tablet, Take 1 tablet by mouth Daily As Needed (Systolic (top number) BP >140 after losartan has had 2+ hours to take effect). (Patient taking differently: Take 1 tablet by mouth Every Night.), Disp: 30 tablet, Rfl: 11    levETIRAcetam (KEPPRA) 750 MG tablet, Take 1 tablet by mouth 2 (Two) Times a Day., Disp: , Rfl:     losartan (COZAAR) 50 MG tablet, Take 1 tablet by mouth Daily. (Patient taking differently: Take 2 tablets by mouth Every Morning.), Disp: 180 tablet, Rfl: 3    multivitamin with minerals tablet tablet, Take 1 tablet by mouth Daily., Disp: , Rfl:       History of Present Illness   Andrae Garza Jr. is a 68 y.o. year old male here for fu.    No new episodes since being diagnosed with epilepsy  He is following his blood pressure and titrate his medication accordingly  OBJECTIVE:  Vitals:    11/09/23 1312   BP: 122/78   BP Location: Right arm   Patient Position: Sitting   Pulse: 69   SpO2: 96%   Weight: 109 kg (239 lb 8 oz)   Height: 185.4 cm (73\")       Body mass index is 31.6 kg/m².    Constitutional:       Appearance: Healthy appearance. Not in distress.   Pulmonary:      Effort: Pulmonary effort is normal.      Breath sounds: Normal breath sounds. No wheezing. No rhonchi. No rales.   Cardiovascular:      PMI at left midclavicular line. Normal rate. Regular rhythm. Normal S1. Normal S2.       Murmurs: There is a systolic murmur.      No gallop.  No click. No rub.   Pulses:     Intact distal pulses.   Edema:     Peripheral edema absent.   Skin:     General: Skin is warm and dry.   Neurological:      General: No focal deficit present.      Mental Status: Alert and oriented to person, place and time.         Diagnostic " Data:  Procedures  Pacemaker interrogation  Acceptable thresholds and impedances  No mode switch  Generator greater than 12 years      ASSESSMENT:   Diagnosis Plan   1. Syncope and collapse        2. Complete heart block        3. History of aortic valve replacement with bioprosthetic valve              PLAN:  Syncope and collapse- now dx w epilepsy    AV block with acceptable pacemaker function    Valvular heart disease post replaced echocardiogram displayed some mild LV systolic dysfunction and normal valvular  function .  Device interrogation ruled out arrhythmia as the cause.         Gaston Alcala MD, FACC

## 2023-12-04 ENCOUNTER — OFFICE VISIT (OUTPATIENT)
Dept: FAMILY MEDICINE CLINIC | Facility: CLINIC | Age: 68
End: 2023-12-04
Payer: MEDICARE

## 2023-12-04 VITALS
HEIGHT: 73 IN | DIASTOLIC BLOOD PRESSURE: 84 MMHG | HEART RATE: 70 BPM | SYSTOLIC BLOOD PRESSURE: 128 MMHG | TEMPERATURE: 97.1 F | OXYGEN SATURATION: 98 % | WEIGHT: 238.8 LBS | BODY MASS INDEX: 31.65 KG/M2

## 2023-12-04 DIAGNOSIS — I10 ESSENTIAL HYPERTENSION: Primary | ICD-10-CM

## 2023-12-04 DIAGNOSIS — G40.909 SEIZURE DISORDER: ICD-10-CM

## 2023-12-04 PROCEDURE — 3074F SYST BP LT 130 MM HG: CPT | Performed by: FAMILY MEDICINE

## 2023-12-04 PROCEDURE — 1160F RVW MEDS BY RX/DR IN RCRD: CPT | Performed by: FAMILY MEDICINE

## 2023-12-04 PROCEDURE — 99213 OFFICE O/P EST LOW 20 MIN: CPT | Performed by: FAMILY MEDICINE

## 2023-12-04 PROCEDURE — 3079F DIAST BP 80-89 MM HG: CPT | Performed by: FAMILY MEDICINE

## 2023-12-04 PROCEDURE — 1159F MED LIST DOCD IN RCRD: CPT | Performed by: FAMILY MEDICINE

## 2023-12-04 NOTE — PROGRESS NOTES
Chief Complaint  Syncope (6 month f/u), Nicotine Dependence, and Hyperlipidemia    Subjective          Andrae Garza Jr. presents to [facility]    Andrae Garza is a 68-year-old male who presents today for a follow-up.    Seizure disorder  He was last seen in 06/2023. He states that he is still trying to figure out his blackout. He was sent for an EEG at the hospital. Dr. Alcala sent him to a neurologist at [facility]. He was put in the hospital for an EEG. While he was in the hospital, he had another blackout. Dr. Alcala had him scheduled with another doctor who would do a test on his memory. Had it scheduled, but they could not find her on his insurance name. He conveys that it is billed through [facility] and he was trying to reschedule that. He saw Dr. Alcala about 2 weeks ago. He conveys that on 12/22/2023, he will be allowed to drive again because it has 3 months. He will have occasional double vision for a long time. He conveys that the other day it was really bad. 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 He conveys that 2 to 3 minutes later it would be gone. He has not noticed anything since being on the Keppra. He conveys that there are no notifications, no warning and there were not any before he started on the Keppra. 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 He has only driven twice since 05/28/2023. He has no feelings. He was told that it showed signs of onset Alzheimer's. He does not see Dr. Alcala again in 02/2024. He denies any chest pain or trouble breathing. He has not done anything since 09/22/2023. He has not done any blood work since 05/31/2023. He was supposed to see Dr. Lomeli next week, but Dr. Lomeli canceled and rescheduled for 01/03/2024. He ate today. He was taking losartan 1 in the morning and 1 at night but now he is taking both of them in the morning. He is  "stuck on Mountain Dew. He can sip on it when it is warm. He does not just sit there and drink one. 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    He conveys that according to his pacemaker, during the times he blacked out, his heart never stopped, and the pacemaker never initiated.    Health maintenance  His blood pressure today 12/04/2023, is 128/84mmHg.      Review of Systems   Respiratory: Negative.     Cardiovascular:  Positive for syncope. Negative for chest pain.   Gastrointestinal: Negative.    Neurological:  Positive for seizures and syncope.   All other systems reviewed and are negative.       Objective       Vital Signs:   /84 (BP Location: Left arm)   Pulse 70   Temp 97.1 °F (36.2 °C)   Ht 185.4 cm (73\")   Wt 108 kg (238 lb 12.8 oz)   SpO2 98%   BMI 31.51 kg/m²     Physical Exam  Vitals and nursing note reviewed.   Constitutional:       Appearance: He is well-developed.   HENT:      Head: Normocephalic and atraumatic.      Right Ear: External ear normal.      Left Ear: External ear normal.   Eyes:      Pupils: Pupils are equal, round, and reactive to light.   Cardiovascular:      Rate and Rhythm: Normal rate and regular rhythm.      Heart sounds: Normal heart sounds.   Pulmonary:      Effort: Pulmonary effort is normal. No respiratory distress.      Breath sounds: Normal breath sounds. No wheezing or rales.   Musculoskeletal:      Right lower leg: No edema.      Left lower leg: No edema.   Skin:     General: Skin is warm and dry.   Neurological:      Mental Status: He is alert and oriented to person, place, and time.   Psychiatric:         Behavior: Behavior normal.          Result Review :                     Assessment and Plan    Diagnoses and all orders for this visit:    1. Essential hypertension (Primary)  -     Comprehensive Metabolic Panel    2. Seizure disorder          DISCUSSION    1. Hypertension.  Blood pressure is doing well. Continue current medications.    2. Seizure " disorder.  - He was found that he had episodes of a seizure which was causing his syncopal episodes, and they were not cardiac in nature.  - He is on Keppra. He is doing well.   - He will be able to drive on 12/22/2023.   - He has a follow-up with neurology in 02/2024, so we will follow up with him 3 months after that and in 6 months.    - Continue to follow-up with cardiology as well.   - We will check CMP today as well.    3. Malignant melanoma.  - He had melanoma on his head which was biopsied by Dr. Onofre and then sent to [facility] for surgery.   - We will continue to have that followed by dermatology.    Follow up in 6 months.    He is in agreement with the plan.          Patient was given instructions and counseling regarding his condition or for health maintenance advice. Please see specific information pulled into the AVS if appropriate.       Brent Botello MD    Transcribed from ambient dictation for Brent Botello MD by Russell Griffith.  12/04/23   17:07 EST

## 2023-12-05 LAB
ALBUMIN SERPL-MCNC: 4.4 G/DL (ref 3.9–4.9)
ALBUMIN/GLOB SERPL: 2.2 {RATIO} (ref 1.2–2.2)
ALP SERPL-CCNC: 92 IU/L (ref 44–121)
ALT SERPL-CCNC: 24 IU/L (ref 0–44)
AST SERPL-CCNC: 25 IU/L (ref 0–40)
BILIRUB SERPL-MCNC: 0.3 MG/DL (ref 0–1.2)
BUN SERPL-MCNC: 13 MG/DL (ref 8–27)
BUN/CREAT SERPL: 10 (ref 10–24)
CALCIUM SERPL-MCNC: 9.3 MG/DL (ref 8.6–10.2)
CHLORIDE SERPL-SCNC: 104 MMOL/L (ref 96–106)
CO2 SERPL-SCNC: 25 MMOL/L (ref 20–29)
CREAT SERPL-MCNC: 1.27 MG/DL (ref 0.76–1.27)
EGFRCR SERPLBLD CKD-EPI 2021: 61 ML/MIN/1.73
GLOBULIN SER CALC-MCNC: 2 G/DL (ref 1.5–4.5)
GLUCOSE SERPL-MCNC: 84 MG/DL (ref 70–99)
POTASSIUM SERPL-SCNC: 4.4 MMOL/L (ref 3.5–5.2)
PROT SERPL-MCNC: 6.4 G/DL (ref 6–8.5)
SODIUM SERPL-SCNC: 142 MMOL/L (ref 134–144)

## 2024-06-03 ENCOUNTER — OFFICE VISIT (OUTPATIENT)
Dept: FAMILY MEDICINE CLINIC | Facility: CLINIC | Age: 69
End: 2024-06-03
Payer: MEDICARE

## 2024-06-03 VITALS
HEIGHT: 73 IN | BODY MASS INDEX: 31.68 KG/M2 | HEART RATE: 72 BPM | WEIGHT: 239 LBS | DIASTOLIC BLOOD PRESSURE: 86 MMHG | SYSTOLIC BLOOD PRESSURE: 140 MMHG | TEMPERATURE: 98.1 F | RESPIRATION RATE: 18 BRPM

## 2024-06-03 DIAGNOSIS — R19.7 DIARRHEA, UNSPECIFIED TYPE: ICD-10-CM

## 2024-06-03 DIAGNOSIS — Z23 NEED FOR SHINGLES VACCINE: ICD-10-CM

## 2024-06-03 DIAGNOSIS — I10 ESSENTIAL HYPERTENSION: ICD-10-CM

## 2024-06-03 DIAGNOSIS — S61.412S: ICD-10-CM

## 2024-06-03 DIAGNOSIS — F17.210 CIGARETTE NICOTINE DEPENDENCE WITHOUT COMPLICATION: ICD-10-CM

## 2024-06-03 DIAGNOSIS — Z00.00 MEDICARE ANNUAL WELLNESS VISIT, SUBSEQUENT: Primary | ICD-10-CM

## 2024-06-03 DIAGNOSIS — G40.909 SEIZURE DISORDER: ICD-10-CM

## 2024-06-03 DIAGNOSIS — Z23 NEED FOR DIPHTHERIA-TETANUS-PERTUSSIS (TDAP) VACCINE: ICD-10-CM

## 2024-06-03 DIAGNOSIS — Z13.6 ENCOUNTER FOR ABDOMINAL AORTIC ANEURYSM (AAA) SCREENING: ICD-10-CM

## 2024-06-03 DIAGNOSIS — C64.1 RENAL CELL CARCINOMA OF RIGHT KIDNEY: ICD-10-CM

## 2024-06-03 DIAGNOSIS — Z12.2 ENCOUNTER FOR SCREENING FOR LUNG CANCER: ICD-10-CM

## 2024-06-03 DIAGNOSIS — Z29.11 NEED FOR RSV IMMUNIZATION: ICD-10-CM

## 2024-06-03 DIAGNOSIS — Z12.11 COLON CANCER SCREENING: ICD-10-CM

## 2024-06-03 PROCEDURE — 99214 OFFICE O/P EST MOD 30 MIN: CPT | Performed by: FAMILY MEDICINE

## 2024-06-03 PROCEDURE — 3077F SYST BP >= 140 MM HG: CPT | Performed by: FAMILY MEDICINE

## 2024-06-03 PROCEDURE — 1126F AMNT PAIN NOTED NONE PRSNT: CPT | Performed by: FAMILY MEDICINE

## 2024-06-03 PROCEDURE — 1159F MED LIST DOCD IN RCRD: CPT | Performed by: FAMILY MEDICINE

## 2024-06-03 PROCEDURE — 1170F FXNL STATUS ASSESSED: CPT | Performed by: FAMILY MEDICINE

## 2024-06-03 PROCEDURE — G0439 PPPS, SUBSEQ VISIT: HCPCS | Performed by: FAMILY MEDICINE

## 2024-06-03 PROCEDURE — 1160F RVW MEDS BY RX/DR IN RCRD: CPT | Performed by: FAMILY MEDICINE

## 2024-06-03 PROCEDURE — 3079F DIAST BP 80-89 MM HG: CPT | Performed by: FAMILY MEDICINE

## 2024-06-03 PROCEDURE — 90471 IMMUNIZATION ADMIN: CPT | Performed by: FAMILY MEDICINE

## 2024-06-03 PROCEDURE — 90715 TDAP VACCINE 7 YRS/> IM: CPT | Performed by: FAMILY MEDICINE

## 2024-06-03 RX ORDER — MONTELUKAST SODIUM 4 MG/1
1 TABLET, CHEWABLE ORAL 2 TIMES DAILY
Qty: 60 TABLET | Refills: 2 | Status: SHIPPED | OUTPATIENT
Start: 2024-06-03

## 2024-06-03 RX ORDER — ZOSTER VACCINE RECOMBINANT, ADJUVANTED 50 MCG/0.5
0.5 KIT INTRAMUSCULAR ONCE
Qty: 1 EACH | Refills: 1 | Status: SHIPPED | OUTPATIENT
Start: 2024-06-03 | End: 2024-06-03

## 2024-06-03 NOTE — LETTER
[facility]  Vaccine Consent Form    Patient Name:  Andrae Garza Jr.  Patient :  1955     Vaccine(s) Ordered    Tdap Vaccine => 8yo IM (BOOSTRIX)        Screening Checklist  The following questions should be completed prior to vaccination. If you answer “yes” to any question, it does not necessarily mean you should not be vaccinated. It just means we may need to clarify or ask more questions. If a question is unclear, please ask your healthcare provider to explain it.    Yes No   Any fever or moderate to severe illness today (mild illness and/or antibiotic treatment are not contraindications)?     Do you have a history of a serious reaction to any previous vaccinations, such as anaphylaxis, encephalopathy within 7 days, Guillain-[place] syndrome within 6 weeks, seizure?     Have you received any live vaccine(s) (e.g MMR, CAMILA) or any other vaccines in the last month (to ensure duplicate doses aren't given)?     Do you have an anaphylactic allergy to latex (DTaP, DTaP-IPV, Hep A, Hep B, MenB, RV, Td, Tdap), baker’s yeast (Hep B, HPV), polysorbates (RSV, nirsevimab, PCV 20, Rotavirrus, Tdap, Shingrix), or gelatin (CAMILA, MMR)?     Do you have an anaphylactic allergy to neomycin (Rabies, CAMILA, MMR, IPV, Hep A), polymyxin B (IPV), or streptomycin (IPV)?      Any cancer, leukemia, AIDS, or other immune system disorder? (CAMILA, MMR, RV)     Do you have a parent, brother, or sister with an immune system problem (if immune competence of vaccine recipient clinically verified, can proceed)? (MMR, CAMILA)     Any recent steroid treatments for >2 weeks, chemotherapy, or radiation treatment? (CAMILA, MMR)     Have you received antibody-containing blood transfusions or IVIG in the past 11 months (recommended interval is dependent on product)? (MMR, CAMILA)     Have you taken antiviral drugs (acyclovir, famciclovir, valacyclovir for CAMILA) in the last 24 or 48 hours, respectively?      Are you pregnant or planning to become pregnant  "within 1 month? (CAMILA, MMR, HPV, IPV, MenB, Abrexvy; For Hep B- refer to Engerix-B; For RSV - Abrysvo is indicated for 32-36 weeks of pregnancy from September to January)     For infants, have you ever been told your child has had intussusception or a medical emergency involving obstruction of the intestine (Rotavirus)? If not for an infant, can skip this question.         *Ordering Physicians/APC should be consulted if \"yes\" is checked by the patient or guardian above.  I have received, read, and understand the Vaccine Information Statement (VIS) for each vaccine ordered.  I have considered my or my child's health status as well as the health status of my close contacts.  I have taken the opportunity to discuss my vaccine questions with my or my child's health care provider.   I have requested that the ordered vaccine(s) be given to me or my child.  I understand the benefits and risks of the vaccines.  I understand that I should remain in the clinic for 15 minutes after receiving the vaccine(s).  _________________________________________________________  Signature of Patient or Parent/Legal Guardian ____________________  Date     "

## 2024-06-03 NOTE — PROGRESS NOTES
The ABCs of the Annual Wellness Visit  Subsequent Medicare Wellness Visit    Subjective    Andrae Garza Jr. is a 68 y.o. male who presents for a Subsequent Medicare Wellness Visit.    The following portions of the patient's history were reviewed and   updated as appropriate: allergies, current medications, past medical history, past social history, past surgical history, and problem list.    Compared to one year ago, the patient feels his physical   health is the same.    Compared to one year ago, the patient feels his mental   health is the same.    Recent Hospitalizations:  He was not admitted to the hospital during the last year.       Current Medical Providers:  Patient Care Team:  Brent Botello MD as PCP - General (Family Medicine)  George Lomeli MD as Consulting Physician (Urology)  Thee Abebe MD (Cardiology)  Gaston Alcala MD as Consulting Physician (Cardiology)  Jerad Navarro MD as Consulting Physician (Cardiology)  Sandra Botello DO (Neurology)    Outpatient Medications Prior to Visit   Medication Sig Dispense Refill    amLODIPine (NORVASC) 5 MG tablet Take 1 tablet by mouth Daily As Needed (Systolic (top number) BP >140 after losartan has had 2+ hours to take effect). (Patient taking differently: Take 1 tablet by mouth Every Night.) 30 tablet 11    levETIRAcetam (KEPPRA) 750 MG tablet Take 1 tablet by mouth 2 (Two) Times a Day.      losartan (COZAAR) 50 MG tablet Take 1 tablet by mouth Daily. (Patient taking differently: Take 2 tablets by mouth Every Morning.) 180 tablet 3    multivitamin with minerals tablet tablet Take 1 tablet by mouth Daily.       No facility-administered medications prior to visit.       No opioid medication identified on active medication list. I have reviewed chart for other potential  high risk medication/s and harmful drug interactions in the elderly.        Aspirin is not on active medication list.  Aspirin use is not indicated based on review  "of current medical condition/s. Risk of harm outweighs potential benefits.  .    Patient Active Problem List   Diagnosis    Benign prostatic hyperplasia with urinary obstruction    Atopic rhinitis    Hyperlipidemia    Hypertension    Renal cell carcinoma    Cobalamin deficiency    H/O aortic valve replacement with porcine valve    Melanoma    Syncope and collapse    CHB (complete heart block)    Cardiac standstill    Complete heart block     Advance Care Planning   Advance Care Planning     Advance Directive is not on file.  ACP discussion was held with the patient during this visit. Patient has an advance directive (not in EMR), copy requested.     Objective    Vitals:    24 1030   BP: 140/86   Pulse: 72   Resp: 18   Temp: 98.1 °F (36.7 °C)   Weight: 108 kg (239 lb)   Height: 185.4 cm (73\")   PainSc: 0-No pain     Estimated body mass index is 31.53 kg/m² as calculated from the following:    Height as of this encounter: 185.4 cm (73\").    Weight as of this encounter: 108 kg (239 lb).    BMI is >= 30 and <35. (Class 1 Obesity). The following options were offered after discussion;: exercise counseling/recommendations and nutrition counseling/recommendations      Does the patient have evidence of cognitive impairment? No          HEALTH RISK ASSESSMENT    Smoking Status:  Social History     Tobacco Use   Smoking Status Every Day    Current packs/day: 1.00    Average packs/day: 1 pack/day for 45.0 years (45.0 ttl pk-yrs)    Types: Cigarettes   Smokeless Tobacco Never     Alcohol Consumption:  Social History     Substance and Sexual Activity   Alcohol Use Yes    Alcohol/week: 1.0 standard drink of alcohol    Types: 1 Shots of liquor per week    Comment: 3 x week     Fall Risk Screen:    STEADI Fall Risk Assessment was completed, and patient is at MODERATE risk for falls. Assessment completed on:6/3/2024    Depression Screenin/3/2024    10:35 AM   PHQ-2/PHQ-9 Depression Screening   Little Interest or " Pleasure in Doing Things 0-->not at all   Feeling Down, Depressed or Hopeless 0-->not at all   PHQ-9: Brief Depression Severity Measure Score 0       Health Habits and Functional and Cognitive Screenin/3/2024    10:35 AM   Functional & Cognitive Status   Do you have difficulty preparing food and eating? No   Do you have difficulty bathing yourself, getting dressed or grooming yourself? No   Do you have difficulty using the toilet? No   Do you have difficulty moving around from place to place? No   Do you have trouble with steps or getting out of a bed or a chair? No   Current Diet Unhealthy Diet   Dental Exam Up to date   Eye Exam Up to date   Exercise (times per week) 4 times per week   Do you need help using the phone?  No   Are you deaf or do you have serious difficulty hearing?  No   Do you need help to go to places out of walking distance? No   Do you need help shopping? No   Do you need help preparing meals?  No   Do you need help with housework?  No   Do you need help with laundry? No   Do you need help taking your medications? No   Do you need help managing money? No   Do you ever drive or ride in a car without wearing a seat belt? Yes   Have you felt unusual stress, anger or loneliness in the last month? No   Who do you live with? Spouse   If you need help, do you have trouble finding someone available to you? No   Have you been bothered in the last four weeks by sexual problems? No   Do you have difficulty concentrating, remembering or making decisions? Yes       Age-appropriate Screening Schedule:  Refer to the list below for future screening recommendations based on patient's age, sex and/or medical conditions. Orders for these recommended tests are listed in the plan section. The patient has been provided with a written plan.    Health Maintenance   Topic Date Due    AAA SCREEN (ONE-TIME)  Never done    COLORECTAL CANCER SCREENING  2024    LIPID PANEL  2024    LUNG CANCER SCREENING   06/16/2024    RSV Vaccine - Adults (1 - 1-dose 60+ series) 06/03/2025 (Originally 7/31/2014)    ZOSTER VACCINE (1 of 2) 06/03/2025 (Originally 7/31/2004)    INFLUENZA VACCINE  08/01/2024    ANNUAL WELLNESS VISIT  06/03/2025    BMI FOLLOWUP  06/03/2025    TDAP/TD VACCINES (2 - Td or Tdap) 06/03/2034    HEPATITIS C SCREENING  Completed    Pneumococcal Vaccine 65+  Completed    COVID-19 Vaccine  Discontinued                  CMS Preventative Services Quick Reference  Risk Factors Identified During Encounter  Immunizations Discussed/Encouraged: Tdap, Shingrix, and RSV (Respiratory Syncytial Virus)  Dental Screening Recommended  Vision Screening Recommended  The above risks/problems have been discussed with the patient.  Pertinent information has been shared with the patient in the After Visit Summary.  An After Visit Summary and PPPS were made available to the patient.    Follow Up:   Next Medicare Wellness visit to be scheduled in 1 year.       Additional E&M Note during same encounter follows:  Patient has multiple medical problems which are significant and separately identifiable that require additional work above and beyond the Medicare Wellness Visit.      Chief Complaint  Medicare Wellness-subsequent    Subjective        HPI  Andrae FAUSTO Garza Jr. is also being seen today for         History of Present Illness  The patient is a 68-year-old male who is here for Medicare wellness exam.    The patient reports overall good health, however, he experiences thinning skin, leading to skin tears. He is currently not on any anticoagulant therapy. He has attempted to alleviate the condition with various Band-Aids due to the latter becoming lodged. He is due for a tetanus injection. He frequently scratches his skin, particularly when he is around horses and working in dirt. He has a scab on his left hand, which he applies lotion to prevent bleeding. He sustained a laceration on his left hand while working on a mower and  "cutting a bush. He declines further COVID-19 vaccinations. He expresses a desire to receive the shingles vaccine. He has a history of shingles in his right eye, which was treated by Dr. Oviedo. He continues to take amlodipine and losartan for hypertension, and reports no recent episodes of dizziness. He also takes Keppra 750 mg twice daily, which he reports causes low energy levels. He is under the care of Dr. Brandt at [facility]. He has not undergone a Keppra level test. He underwent a PSA test in 01/2024 and is considering changing his urologist. He has not undergone any kidney tests or scans. He has a history of right nephrectomy 17 years ago. He experiences diarrhea postprandially, particularly when consuming high-fiber foods. He underwent a colonoscopy on 01/07/2014, which revealed a hyperplastic polyp. He has gained 10 pounds. He feels the same physically and mentally as he did a year ago. He was hospitalized last year for an EEG at [facility]. He has a living will due to a grand mal seizure. He reports memory issues, which he attributes to aging. He reports difficulty retaining urine and a diminished urinary stream. He reports poor hearing. His last seizure was more than a year ago.   He smokes 1 pack a day.           Objective   Vital Signs:  /86   Pulse 72   Temp 98.1 °F (36.7 °C)   Resp 18   Ht 185.4 cm (73\")   Wt 108 kg (239 lb)   BMI 31.53 kg/m²     Physical Exam  Vitals and nursing note reviewed.   Constitutional:       General: He is not in acute distress.     Appearance: Normal appearance. He is well-developed. He is not ill-appearing.   HENT:      Head: Normocephalic and atraumatic.      Right Ear: Hearing, tympanic membrane, ear canal and external ear normal.      Left Ear: Hearing, tympanic membrane, ear canal and external ear normal.      Nose: Nose normal. No congestion or rhinorrhea.      Mouth/Throat:      Mouth: Mucous membranes are moist.      Pharynx: No oropharyngeal exudate or " posterior oropharyngeal erythema.   Eyes:      General: Lids are normal.      Conjunctiva/sclera: Conjunctivae normal.      Pupils: Pupils are equal, round, and reactive to light.   Neck:      Thyroid: No thyromegaly.   Cardiovascular:      Rate and Rhythm: Normal rate and regular rhythm.      Heart sounds: Normal heart sounds. No murmur heard.     No friction rub.   Pulmonary:      Effort: Pulmonary effort is normal. No respiratory distress.      Breath sounds: Normal breath sounds. No wheezing or rales.   Abdominal:      General: Bowel sounds are normal. There is no distension.      Palpations: Abdomen is soft. There is no mass.      Tenderness: There is no abdominal tenderness. There is no guarding or rebound.   Musculoskeletal:      Right lower leg: No edema.      Left lower leg: No edema.   Skin:     General: Skin is warm and dry.   Neurological:      General: No focal deficit present.      Mental Status: He is alert.   Psychiatric:         Mood and Affect: Mood normal.         Speech: Speech normal.         Behavior: Behavior normal.        Healing laceration on the dorsum of the left hand.                   Assessment and Plan   Diagnoses and all orders for this visit:    1. Medicare annual wellness visit, subsequent (Primary)    2. Cut of left hand, sequela  -     Tdap Vaccine => 6yo IM (BOOSTRIX)    3. Need for diphtheria-tetanus-pertussis (Tdap) vaccine  -     Tdap Vaccine => 6yo IM (BOOSTRIX)    4. Need for RSV immunization  -     RSVPreF3 Vac Recomb Adjuvanted (AREXVY) 120 MCG/0.5ML reconstituted suspension injection; Inject 0.5 mL into the appropriate muscle as directed by prescriber 1 (One) Time for 1 dose.  Dispense: 0.5 mL; Refill: 0    5. Need for shingles vaccine  -     Shingrix 50 MCG/0.5ML reconstituted suspension; Inject 0.5 mL into the appropriate muscle as directed by prescriber 1 (One) Time for 1 dose. Repeat on 2-6 months  Dispense: 1 each; Refill: 1    6. Seizure disorder  -      Levetiracetam Level (Keppra)    7. Essential hypertension  -     CBC & Differential  -     Comprehensive Metabolic Panel  -     Lipid Panel With / Chol / HDL Ratio    8. Renal cell carcinoma of right kidney  -     US renal limited; Future    9. Diarrhea, unspecified type  -     colestipol (COLESTID) 1 g tablet; Take 1 tablet by mouth 2 (Two) Times a Day.  Dispense: 60 tablet; Refill: 2    10. Colon cancer screening  -     Ambulatory Referral For Screening Colonoscopy    11. Encounter for screening for lung cancer  -     CT Chest Low Dose Wo; Future    12. Cigarette nicotine dependence without complication  -     CT Chest Low Dose Wo; Future  -     [place] aaa screen limited; Future    13. Encounter for abdominal aortic aneurysm (AAA) screening  -     [place] aaa screen limited; Future      Assessment & Plan  1. Medicare wellness exam.  A tetanus vaccine will be administered today. Additionally, a prescription for RSV and shingles vaccines will be provided. Regular blood work, including Keppra level, cholesterol, and PSA, will be conducted. An ultrasound of the kidney will be ordered. Colestipol will be prescribed, to be taken twice daily. A colonoscopy will be scheduled. A CT scan for lung cancer screening will be ordered. An aneurysm screening will also be ordered.            Follow Up   Return for follow up depends on review of labs and testing.  Patient was given instructions and counseling regarding his condition or for health maintenance advice. Please see specific information pulled into the AVS if appropriate.       Brent Botello MD      Patient or patient representative verbalized consent for the use of Ambient Listening during the visit with  Brent Botello MD for chart documentation.

## 2024-06-05 LAB
ALBUMIN SERPL-MCNC: 4 G/DL (ref 3.9–4.9)
ALBUMIN/GLOB SERPL: 1.9 {RATIO} (ref 1.2–2.2)
ALP SERPL-CCNC: 81 IU/L (ref 44–121)
ALT SERPL-CCNC: 25 IU/L (ref 0–44)
AST SERPL-CCNC: 30 IU/L (ref 0–40)
BASOPHILS # BLD AUTO: 0.1 X10E3/UL (ref 0–0.2)
BASOPHILS NFR BLD AUTO: 1 %
BILIRUB SERPL-MCNC: 0.8 MG/DL (ref 0–1.2)
BUN SERPL-MCNC: 14 MG/DL (ref 8–27)
BUN/CREAT SERPL: 12 (ref 10–24)
CALCIUM SERPL-MCNC: 9.2 MG/DL (ref 8.6–10.2)
CHLORIDE SERPL-SCNC: 102 MMOL/L (ref 96–106)
CHOLEST SERPL-MCNC: 159 MG/DL (ref 100–199)
CHOLEST/HDLC SERPL: 3.2 RATIO (ref 0–5)
CO2 SERPL-SCNC: 22 MMOL/L (ref 20–29)
CREAT SERPL-MCNC: 1.15 MG/DL (ref 0.76–1.27)
EGFRCR SERPLBLD CKD-EPI 2021: 69 ML/MIN/1.73
EOSINOPHIL # BLD AUTO: 0.2 X10E3/UL (ref 0–0.4)
EOSINOPHIL NFR BLD AUTO: 3 %
ERYTHROCYTE [DISTWIDTH] IN BLOOD BY AUTOMATED COUNT: 12.5 % (ref 11.6–15.4)
GLOBULIN SER CALC-MCNC: 2.1 G/DL (ref 1.5–4.5)
GLUCOSE SERPL-MCNC: 91 MG/DL (ref 70–99)
HCT VFR BLD AUTO: 48.7 % (ref 37.5–51)
HDLC SERPL-MCNC: 49 MG/DL
HGB BLD-MCNC: 15.7 G/DL (ref 13–17.7)
IMM GRANULOCYTES # BLD AUTO: 0 X10E3/UL (ref 0–0.1)
IMM GRANULOCYTES NFR BLD AUTO: 0 %
LDLC SERPL CALC-MCNC: 91 MG/DL (ref 0–99)
LEVETIRACETAM SERPL-MCNC: 28.8 UG/ML (ref 10–40)
LYMPHOCYTES # BLD AUTO: 1.4 X10E3/UL (ref 0.7–3.1)
LYMPHOCYTES NFR BLD AUTO: 23 %
MCH RBC QN AUTO: 30.6 PG (ref 26.6–33)
MCHC RBC AUTO-ENTMCNC: 32.2 G/DL (ref 31.5–35.7)
MCV RBC AUTO: 95 FL (ref 79–97)
MONOCYTES # BLD AUTO: 0.6 X10E3/UL (ref 0.1–0.9)
MONOCYTES NFR BLD AUTO: 10 %
NEUTROPHILS # BLD AUTO: 3.7 X10E3/UL (ref 1.4–7)
NEUTROPHILS NFR BLD AUTO: 63 %
PLATELET # BLD AUTO: 191 X10E3/UL (ref 150–450)
POTASSIUM SERPL-SCNC: 4.6 MMOL/L (ref 3.5–5.2)
PROT SERPL-MCNC: 6.1 G/DL (ref 6–8.5)
RBC # BLD AUTO: 5.13 X10E6/UL (ref 4.14–5.8)
SODIUM SERPL-SCNC: 140 MMOL/L (ref 134–144)
TRIGL SERPL-MCNC: 101 MG/DL (ref 0–149)
VLDLC SERPL CALC-MCNC: 19 MG/DL (ref 5–40)
WBC # BLD AUTO: 5.9 X10E3/UL (ref 3.4–10.8)

## 2024-06-24 ENCOUNTER — HOSPITAL ENCOUNTER (OUTPATIENT)
Dept: ULTRASOUND IMAGING | Facility: HOSPITAL | Age: 69
Discharge: HOME | End: 2024-06-24
Payer: MEDICARE

## 2024-06-24 ENCOUNTER — HOSPITAL ENCOUNTER (OUTPATIENT)
Dept: CT IMAGING | Facility: HOSPITAL | Age: 69
Discharge: HOME | End: 2024-06-24
Payer: MEDICARE

## 2024-06-24 DIAGNOSIS — Z12.2 ENCOUNTER FOR SCREENING FOR LUNG CANCER: ICD-10-CM

## 2024-06-24 DIAGNOSIS — F17.210 CIGARETTE NICOTINE DEPENDENCE WITHOUT COMPLICATION: ICD-10-CM

## 2024-06-24 DIAGNOSIS — Z13.6 ENCOUNTER FOR ABDOMINAL AORTIC ANEURYSM (AAA) SCREENING: ICD-10-CM

## 2024-06-24 DIAGNOSIS — C64.1 RENAL CELL CARCINOMA OF RIGHT KIDNEY: ICD-10-CM

## 2024-06-24 PROCEDURE — 71271 CT THORAX LUNG CANCER SCR C-: CPT

## 2024-06-24 PROCEDURE — 76775 US EXAM ABDO BACK WALL LIM: CPT

## 2024-06-24 PROCEDURE — 76706 US ABDL AORTA SCREEN AAA: CPT

## 2024-08-22 ENCOUNTER — OFFICE VISIT (OUTPATIENT)
Dept: CARDIOLOGY | Facility: CLINIC | Age: 69
End: 2024-08-22
Payer: MEDICARE

## 2024-08-22 VITALS
WEIGHT: 231.5 LBS | OXYGEN SATURATION: 96 % | HEIGHT: 73 IN | HEART RATE: 65 BPM | BODY MASS INDEX: 30.68 KG/M2 | DIASTOLIC BLOOD PRESSURE: 64 MMHG | SYSTOLIC BLOOD PRESSURE: 132 MMHG

## 2024-08-22 DIAGNOSIS — Z95.3 H/O AORTIC VALVE REPLACEMENT WITH PORCINE VALVE: ICD-10-CM

## 2024-08-22 DIAGNOSIS — I44.2 COMPLETE HEART BLOCK: Primary | ICD-10-CM

## 2024-08-22 DIAGNOSIS — E78.2 MIXED HYPERLIPIDEMIA: Chronic | ICD-10-CM

## 2024-08-22 DIAGNOSIS — I10 PRIMARY HYPERTENSION: Chronic | ICD-10-CM

## 2024-08-22 PROCEDURE — 99214 OFFICE O/P EST MOD 30 MIN: CPT | Performed by: INTERNAL MEDICINE

## 2024-08-22 PROCEDURE — 3075F SYST BP GE 130 - 139MM HG: CPT | Performed by: INTERNAL MEDICINE

## 2024-08-22 PROCEDURE — 3078F DIAST BP <80 MM HG: CPT | Performed by: INTERNAL MEDICINE

## 2024-08-22 NOTE — PROGRESS NOTES
"[facility]  Office Progress Note  Andrae Garza Jr.  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  Syncope and collapse  9/21 Syncopal episode felt to be due to orthostatic hypotension  with negative EEG-data deficit   2/15/2022 [facility] ED via EMS, CPR initiated at home prior to EMS arrival, patient combative initially but back to baseline [facility] ED without apparent EMS findings of arrhythmia/hypotension                          2/22 [facility] nl cors EF 50%  2/22 [place] scientific loop recorder implanted - Dr Mclean  8/22 MDT PPM w then lead revision - Dr Navarro  6/23 Echo EF 41-45%, grade I LV diastolic dysfunction, bioprosthetic aortic valve present with normal function  9/23 Dx w epilepsy Dr Brandt  Bicuspid aortic valve   Status post bioprosthetic aortic valve replacement January 2008 at [facility]-Dr Joseph(preop CT w renal mass, cholelithiasis subsequent nephrectomy/ CCX)    2/22 echo EF 50%, nl AVR fxn     2/23 Echo EF 41-45%, grade I LV diastolic dysfunction, bioprosthetic AV present with normal function                Carotid artery disease  2/22 carotid US: Proximal right ICA artery normal.  Proximal left ICA artery plaque without significant stenosis.  LICA stenosis of 0-49%.  Antegrade vertebrals bilaterally.  LBBB  Hypertension  Hyperlipidemia  10/22 146/49/51/84  6/23 155/102/35/101  Obstructive sleep apnea, noncompliant with CPAP-controlled with \"sleep shirt\" with tennis ball in the center  Current tobacco use; <1 pack/day x 40 years, failed chantix, on wellbutrin   History of renal cell carcinoma, status post nephrectomy Dr Lomeli  Surgical history:  Cholecystectomy 2008  AVR 2008  Nephrectomy 2008        CC: " "  Chief Complaint   Patient presents with    Syncope and collapse       Allergies  Allergies   Allergen Reactions    Lisinopril Cough       Current Medications    Current Outpatient Medications:     amLODIPine (NORVASC) 5 MG tablet, Take 1 tablet by mouth Daily As Needed (Systolic (top number) BP >140 after losartan has had 2+ hours to take effect). (Patient taking differently: Take 1 tablet by mouth Every Night.), Disp: 30 tablet, Rfl: 11    colestipol (COLESTID) 1 g tablet, Take 1 tablet by mouth 2 (Two) Times a Day., Disp: 60 tablet, Rfl: 2    levETIRAcetam (KEPPRA) 750 MG tablet, Take 1 tablet by mouth 2 (Two) Times a Day., Disp: , Rfl:     losartan (COZAAR) 50 MG tablet, Take 1 tablet by mouth Daily. (Patient taking differently: Take 2 tablets by mouth Every Morning.), Disp: 180 tablet, Rfl: 3    multivitamin with minerals tablet tablet, Take 1 tablet by mouth Daily., Disp: , Rfl:       History of Present Illness   Andrae Garza Jr. is a 69 y.o. year old male here for fu.  No recurrent syncopal events.  No chest pain.  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    Constitutional:       Appearance: Healthy appearance. Not in distress.   Pulmonary:      Effort: Pulmonary effort is normal.      Breath sounds: Normal breath sounds. No wheezing. No rhonchi. No rales.   Cardiovascular:      PMI at left midclavicular line. Normal rate. Regular rhythm. Normal S1. Normal S2.       Murmurs: There is a systolic murmur.      No gallop.  No click. No rub.   Pulses:     Intact distal pulses.   Edema:     Peripheral edema absent.   Skin:     General: Skin is warm and dry.   Neurological:      General: No focal deficit present.      Mental Status: Alert and " oriented to person, place and time.         Diagnostic Data:  Procedures  Pacemaker interrogation  Acceptable thresholds and impedances  No mode switch  Generator greater than 13 years      ASSESSMENT:   Diagnosis Plan   1. Complete heart block        2. H/O aortic valve replacement with porcine valve        3. Mixed hyperlipidemia        4. Primary hypertension                PLAN:  Syncope and collapse- now dx w epilepsy    AV block with acceptable pacemaker function    Valvular heart disease post replaced echocardiogram displayed some mild LV systolic dysfunction and normal valvular  function .  Device interrogation ruled out arrhythmia as the cause.         Gaston Alcala MD, [facility]

## 2024-08-29 DIAGNOSIS — R19.7 DIARRHEA, UNSPECIFIED TYPE: ICD-10-CM

## 2024-08-29 RX ORDER — MONTELUKAST SODIUM 4 MG/1
1 TABLET, CHEWABLE ORAL 2 TIMES DAILY
Qty: 180 TABLET | Refills: 0 | Status: SHIPPED | OUTPATIENT
Start: 2024-08-29

## 2024-11-04 RX ORDER — AMLODIPINE BESYLATE 5 MG/1
5 TABLET ORAL NIGHTLY
Qty: 90 TABLET | Refills: 3 | Status: SHIPPED | OUTPATIENT
Start: 2024-11-04

## 2024-11-28 DIAGNOSIS — R19.7 DIARRHEA, UNSPECIFIED TYPE: ICD-10-CM

## 2024-12-01 RX ORDER — LOSARTAN POTASSIUM 100 MG/1
100 TABLET ORAL DAILY
Qty: 90 TABLET | Refills: 3 | Status: SHIPPED | OUTPATIENT
Start: 2024-12-01

## 2024-12-02 RX ORDER — MONTELUKAST SODIUM 4 MG/1
1 TABLET, CHEWABLE ORAL 2 TIMES DAILY
Qty: 180 TABLET | Refills: 0 | Status: SHIPPED | OUTPATIENT
Start: 2024-12-02

## 2024-12-03 ENCOUNTER — OFFICE VISIT (OUTPATIENT)
Dept: FAMILY MEDICINE CLINIC | Facility: CLINIC | Age: 69
End: 2024-12-03
Payer: MEDICARE

## 2024-12-03 VITALS
TEMPERATURE: 98 F | SYSTOLIC BLOOD PRESSURE: 122 MMHG | HEART RATE: 105 BPM | HEIGHT: 73 IN | BODY MASS INDEX: 30.64 KG/M2 | OXYGEN SATURATION: 97 % | DIASTOLIC BLOOD PRESSURE: 88 MMHG | RESPIRATION RATE: 18 BRPM | WEIGHT: 231.2 LBS

## 2024-12-03 DIAGNOSIS — G40.909 SEIZURE DISORDER: ICD-10-CM

## 2024-12-03 DIAGNOSIS — R25.1 TREMOR: ICD-10-CM

## 2024-12-03 DIAGNOSIS — I10 ESSENTIAL HYPERTENSION: Primary | ICD-10-CM

## 2024-12-03 DIAGNOSIS — M79.604 RIGHT LEG PAIN: ICD-10-CM

## 2024-12-03 DIAGNOSIS — Z23 NEED FOR INFLUENZA VACCINATION: ICD-10-CM

## 2024-12-03 PROCEDURE — 3074F SYST BP LT 130 MM HG: CPT | Performed by: FAMILY MEDICINE

## 2024-12-03 PROCEDURE — 99214 OFFICE O/P EST MOD 30 MIN: CPT | Performed by: FAMILY MEDICINE

## 2024-12-03 PROCEDURE — 3079F DIAST BP 80-89 MM HG: CPT | Performed by: FAMILY MEDICINE

## 2024-12-03 PROCEDURE — 1126F AMNT PAIN NOTED NONE PRSNT: CPT | Performed by: FAMILY MEDICINE

## 2024-12-03 PROCEDURE — 90662 IIV NO PRSV INCREASED AG IM: CPT | Performed by: FAMILY MEDICINE

## 2024-12-03 PROCEDURE — 1159F MED LIST DOCD IN RCRD: CPT | Performed by: FAMILY MEDICINE

## 2024-12-03 PROCEDURE — 1160F RVW MEDS BY RX/DR IN RCRD: CPT | Performed by: FAMILY MEDICINE

## 2024-12-03 PROCEDURE — G0008 ADMIN INFLUENZA VIRUS VAC: HCPCS | Performed by: FAMILY MEDICINE

## 2024-12-03 NOTE — PROGRESS NOTES
Chief Complaint  6 month follow up (He got really sick in September, he also worried about his blood pressure. )    Subjective          Andrae LAMBERT Kayla Ca presents to [facility]    HPI         The patient is a 77-year-old male who is here for a follow-up visit.    He has been monitoring his blood pressure twice daily due to concerns about hypertension. His readings have been in the high 130s over high 90s, with one instance of 150/96. He experiences headaches on the right side of his temple, similar to when he was first diagnosed with high blood pressure. His blood pressure tends to be lowest in the afternoon. He has been taking his blood pressure medication at various times throughout the day.    He consulted a neurologist in 07/2024 for a seizure and continues to take Keppra, which he reports makes him feel lethargic. He has noticed an increase in tremors since starting Keppra, which occur even when he is at rest. He is considering changing his medication but is concerned about the potential impact on his ability to drive. His last Keppra level was 28.8.    He also takes Colestid for his gallbladder, which he reports causes constipation. He takes one pill at lunch and does not experience diarrhea when he takes it.    He had a cold in late 09/2024 or early 10/2024, which lasted for 2 to 3 weeks and included nasal drainage. He tested negative for COVID-19.    He has not seen a urologist since 01/2024. He occasionally experiences a racing heart at night.    Two weeks ago, he woke up with pain in his right leg, specifically in the joints, calf, and thigh. There was no swelling or heat. The pain was so severe that he had difficulty walking to the bathroom. He took three Advil at 2:00 AM, which provided relief, and two more at 8:00 AM. He has not experienced any pain since then. His dentist suggested he see a cardiologist due to concerns about a potential blood clot. He has not  "experienced any further issues.       OTHER NOTES:          Review of Systems   Constitutional:  Positive for fatigue. Negative for chills, diaphoresis and fever.   HENT:  Negative for congestion, sore throat and swollen glands.    Respiratory:  Negative for cough.    Cardiovascular:  Negative for chest pain.   Gastrointestinal:  Negative for abdominal pain, nausea and vomiting.   Genitourinary:  Negative for dysuria.   Musculoskeletal:  Negative for myalgias and neck pain.   Skin:  Negative for rash.   Neurological:  Negative for weakness and numbness.        Objective       Vital Signs:   /88   Pulse 105   Temp 98 °F (36.7 °C)   Resp 18   Ht 185.4 cm (72.99\")   Wt 105 kg (231 lb 3.2 oz)   SpO2 97%   BMI 30.51 kg/m²     Physical Exam  Vitals and nursing note reviewed.   Constitutional:       General: He is not in acute distress.     Appearance: Normal appearance. He is well-developed. He is not ill-appearing.   HENT:      Head: Normocephalic and atraumatic.      Right Ear: Hearing, tympanic membrane, ear canal and external ear normal.      Left Ear: Hearing, tympanic membrane, ear canal and external ear normal.      Nose: Nose normal. No congestion or rhinorrhea.      Mouth/Throat:      Mouth: Mucous membranes are moist.      Pharynx: No oropharyngeal exudate or posterior oropharyngeal erythema.   Eyes:      General: Lids are normal.      Conjunctiva/sclera: Conjunctivae normal.      Pupils: Pupils are equal, round, and reactive to light.   Neck:      Thyroid: No thyromegaly.   Cardiovascular:      Rate and Rhythm: Normal rate and regular rhythm.      Heart sounds: Normal heart sounds. No murmur heard.     No friction rub.   Pulmonary:      Effort: Pulmonary effort is normal. No respiratory distress.      Breath sounds: Normal breath sounds. No wheezing or rales.   Abdominal:      General: Bowel sounds are normal. There is no distension.      Palpations: Abdomen is soft. There is no mass.      " Tenderness: There is no abdominal tenderness. There is no guarding or rebound.   Musculoskeletal:      Right lower leg: No edema.      Left lower leg: No edema.   Skin:     General: Skin is warm and dry.   Neurological:      Mental Status: He is alert.   Psychiatric:         Mood and Affect: Mood normal.         Speech: Speech normal.         Behavior: Behavior normal.        Leg exam is normal.  There is no tenderness.  No obvious swelling or enlargement.       Vital Signs  Blood pressure is 122/88.       Result Review :            Other Results    Results  - Laboratory Studies:    - Keppra level: 28.8 previously    - Imaging:    - Ultrasound: showed some enlargement of the prostate, there was cyst present in the remaining kidney.             Assessment and Plan    Diagnoses and all orders for this visit:    1. Essential hypertension (Primary)  -     Comprehensive Metabolic Panel  -     Lipid Panel With / Chol / HDL Ratio    2. Seizure disorder  -     Comprehensive Metabolic Panel  -     Levetiracetam Level (Keppra)    3. Tremor    4. Right leg pain    5. Need for influenza vaccination  -     Fluzone High-Dose 65+yrs               DISCUSSION       Hypertension.  He has been experiencing high blood pressure readings, with recent measurements in the high 130s/90s range. He reports headaches localized to the right temple, which he associates with elevated blood pressure. His blood pressure today was 122/88, consistent with a previous reading of 118/84. He is advised to continue monitoring his blood pressure twice daily and report any significant changes.    Seizure disorder.  He continues to take Keppra for seizure management, although he reports feeling lethargic and experiencing tremors, which he believes have worsened. A Keppra level will be checked to ensure it is within the therapeutic range. If the level is high, adjustments to the dosage may be considered to alleviate side effects.    Tremors.  He reports  experiencing tremors, which he believes have worsened since starting Keppra. The tremors occur both at rest and during activities such as shaving. The potential side effect of Keppra causing tremors was discussed. The Keppra level will be checked, and if it is high, a dosage adjustment may be considered.    Constipation.  He experiences constipation as a side effect of Colestid, which he takes for gallbladder-related diarrhea. He is advised to adjust the dosage to find a balance that minimizes constipation while controlling diarrhea. Options include taking the medication less frequently or cutting the pills in half.    Right leg pain.  He experienced severe pain in his right leg two weeks ago, which resolved with Advil and has not recurred. There was no swelling or heat, and the pain was not indicative of a blood clot. He is advised to monitor the situation and return if the pain recurs.    Health Maintenance.  An ultrasound of the kidney and a CT scan of the chest were performed. The ultrasound revealed an enlarged prostate. A copy of the ultrasound will be printed for him to take to his urologist, Dr. Lomeli, for further evaluation.           Follow Up   Return in about 6 months (around 6/3/2025).    Patient was given instructions and counseling regarding his condition or for health maintenance advice. Please see specific information pulled into the AVS if appropriate.       Brent Botello MD    Patient or patient representative verbalized consent for the use of Ambient Listening during the visit with  Brent Botello MD for chart documentation. 12/3/2024  10:03 EST

## 2024-12-04 LAB
ALBUMIN SERPL-MCNC: 4.2 G/DL (ref 3.5–5.2)
ALBUMIN/GLOB SERPL: 2 G/DL
ALP SERPL-CCNC: 82 U/L (ref 39–117)
ALT SERPL-CCNC: 34 U/L (ref 1–41)
AST SERPL-CCNC: 41 U/L (ref 1–40)
BILIRUB SERPL-MCNC: 0.7 MG/DL (ref 0–1.2)
BUN SERPL-MCNC: 14 MG/DL (ref 8–23)
BUN/CREAT SERPL: 12.1 (ref 7–25)
CALCIUM SERPL-MCNC: 9.3 MG/DL (ref 8.6–10.5)
CHLORIDE SERPL-SCNC: 103 MMOL/L (ref 98–107)
CHOLEST SERPL-MCNC: 164 MG/DL (ref 0–200)
CHOLEST/HDLC SERPL: 3.35 {RATIO}
CO2 SERPL-SCNC: 28.1 MMOL/L (ref 22–29)
CREAT SERPL-MCNC: 1.16 MG/DL (ref 0.76–1.27)
EGFRCR SERPLBLD CKD-EPI 2021: 67.8 ML/MIN/1.73
GLOBULIN SER CALC-MCNC: 2.1 GM/DL
GLUCOSE SERPL-MCNC: 87 MG/DL (ref 65–99)
HDLC SERPL-MCNC: 49 MG/DL (ref 40–60)
LDLC SERPL CALC-MCNC: 93 MG/DL (ref 0–100)
LEVETIRACETAM SERPL-MCNC: 34.8 UG/ML (ref 10–40)
POTASSIUM SERPL-SCNC: 4.5 MMOL/L (ref 3.5–5.2)
PROT SERPL-MCNC: 6.3 G/DL (ref 6–8.5)
SODIUM SERPL-SCNC: 143 MMOL/L (ref 136–145)
TRIGL SERPL-MCNC: 121 MG/DL (ref 0–150)
VLDLC SERPL CALC-MCNC: 22 MG/DL (ref 5–40)

## 2025-06-05 ENCOUNTER — OFFICE VISIT (OUTPATIENT)
Dept: FAMILY MEDICINE CLINIC | Facility: CLINIC | Age: 70
End: 2025-06-05
Payer: MEDICARE

## 2025-06-05 VITALS
RESPIRATION RATE: 18 BRPM | DIASTOLIC BLOOD PRESSURE: 82 MMHG | BODY MASS INDEX: 30.88 KG/M2 | TEMPERATURE: 97.5 F | WEIGHT: 233 LBS | HEART RATE: 70 BPM | HEIGHT: 73 IN | SYSTOLIC BLOOD PRESSURE: 130 MMHG

## 2025-06-05 DIAGNOSIS — I10 ESSENTIAL HYPERTENSION: ICD-10-CM

## 2025-06-05 DIAGNOSIS — R53.83 OTHER FATIGUE: ICD-10-CM

## 2025-06-05 DIAGNOSIS — N40.1 BENIGN PROSTATIC HYPERPLASIA (BPH) WITH STRAINING ON URINATION: ICD-10-CM

## 2025-06-05 DIAGNOSIS — R19.7 DIARRHEA, UNSPECIFIED TYPE: ICD-10-CM

## 2025-06-05 DIAGNOSIS — R39.16 BENIGN PROSTATIC HYPERPLASIA (BPH) WITH STRAINING ON URINATION: ICD-10-CM

## 2025-06-05 DIAGNOSIS — G40.909 SEIZURE DISORDER: ICD-10-CM

## 2025-06-05 DIAGNOSIS — Z00.00 MEDICARE ANNUAL WELLNESS VISIT, SUBSEQUENT: Primary | ICD-10-CM

## 2025-06-05 DIAGNOSIS — R25.1 TREMOR: ICD-10-CM

## 2025-06-05 RX ORDER — TAMSULOSIN HYDROCHLORIDE 0.4 MG/1
1 CAPSULE ORAL DAILY
COMMUNITY
Start: 2025-05-22

## 2025-06-05 NOTE — PROGRESS NOTES
Subjective   The ABCs of the Annual Wellness Visit  Medicare Wellness Visit      Andrae Garza Jr. is a 69 y.o. patient who presents for a Medicare Wellness Visit.    The following portions of the patient's history were reviewed and   updated as appropriate: allergies, current medications, past medical history, past social history, past surgical history, and problem list.    Compared to one year ago, the patient's physical   health is the same.  Compared to one year ago, the patient's mental   health is the same.    Recent Hospitalizations:  He was not admitted to the hospital during the last year.     Current Medical Providers:  Patient Care Team:  Brent Botello MD as PCP - General (Family Medicine)  George Lomeli MD as Consulting Physician (Urology)  Thee Abebe MD (Cardiology)  Gaston Alcala MD as Consulting Physician (Cardiology)  Jerad Navarro MD as Consulting Physician (Cardiology)  Sandra Botello DO (Neurology)    Outpatient Medications Prior to Visit   Medication Sig Dispense Refill    tamsulosin (FLOMAX) 0.4 MG capsule 24 hr capsule Take 1 capsule by mouth Daily.      amLODIPine (NORVASC) 5 MG tablet Take 1 tablet by mouth Every Night. 90 tablet 3    colestipol (COLESTID) 1 g tablet TAKE 1 TABLET BY MOUTH TWICE A  tablet 0    levETIRAcetam (KEPPRA) 750 MG tablet Take 1 tablet by mouth 2 (Two) Times a Day.      losartan (COZAAR) 100 MG tablet TAKE 1 TABLET BY MOUTH EVERY DAY 90 tablet 3    multivitamin with minerals tablet tablet Take 1 tablet by mouth Daily.       No facility-administered medications prior to visit.     No opioid medication identified on active medication list. I have reviewed chart for other potential  high risk medication/s and harmful drug interactions in the elderly.      Aspirin is not on active medication list.  Aspirin use is not indicated based on review of current medical condition/s. Risk of harm outweighs potential benefits.  .    Patient  "Active Problem List   Diagnosis    Benign prostatic hyperplasia with urinary obstruction    Atopic rhinitis    Hyperlipidemia    Hypertension    Renal cell carcinoma    Cobalamin deficiency    H/O aortic valve replacement with porcine valve    Melanoma    Syncope and collapse    CHB (complete heart block)    Cardiac standstill    Complete heart block     Advance Care Planning Advance Directive is not on file.  ACP discussion was held with the patient during this visit. He has a living will            Objective   Vitals:    25 1005   BP: 130/82   Pulse: 70   Resp: 18   Temp: 97.5 °F (36.4 °C)   Weight: 106 kg (233 lb)   Height: 185.4 cm (73\")   PainSc: 0-No pain       Estimated body mass index is 30.74 kg/m² as calculated from the following:    Height as of this encounter: 185.4 cm (73\").    Weight as of this encounter: 106 kg (233 lb).    BMI is >= 30 and <35. (Class 1 Obesity). The following options were offered after discussion;: exercise counseling/recommendations and nutrition counseling/recommendations           Does the patient have evidence of cognitive impairment? No                                                                                                Health  Risk Assessment    Smoking Status:  Social History     Tobacco Use   Smoking Status Every Day    Current packs/day: 1.00    Average packs/day: 1 pack/day for 45.0 years (45.0 ttl pk-yrs)    Types: Cigarettes   Smokeless Tobacco Never     Alcohol Consumption:  Social History     Substance and Sexual Activity   Alcohol Use Yes    Alcohol/week: 1.0 standard drink of alcohol    Types: 1 Shots of liquor per week    Comment: 3 x week       Fall Risk Screen  STEADI Fall Risk Assessment has not been completed.    Depression Screening   Little interest or pleasure in doing things? Several days   Feeling down, depressed, or hopeless? Not at all   PHQ-2 Total Score 1      Health Habits and Functional and Cognitive Screenin/29/2025     2:50 " PM   Functional & Cognitive Status   Do you have difficulty preparing food and eating? No   Do you have difficulty bathing yourself, getting dressed or grooming yourself? No   Do you have difficulty using the toilet? No   Do you have difficulty moving around from place to place? No   Do you have trouble with steps or getting out of a bed or a chair? Yes   Current Diet Limited Junk Food   Dental Exam Up to date   Eye Exam Up to date   Exercise (times per week) 2 times per week   Current Exercises Include No Regular Exercise   Do you need help using the phone?  No   Are you deaf or do you have serious difficulty hearing?  No   Do you need help to go to places out of walking distance? No   Do you need help shopping? No   Do you need help preparing meals?  No   Do you need help with housework?  No   Do you need help with laundry? No   Do you need help taking your medications? No   Do you need help managing money? No   Do you ever drive or ride in a car without wearing a seat belt? Yes   Have you felt unusual stress, anger or loneliness in the last month? No   Who do you live with? Spouse   If you need help, do you have trouble finding someone available to you? No   Have you been bothered in the last four weeks by sexual problems? No   Do you have difficulty concentrating, remembering or making decisions? No           Age-appropriate Screening Schedule:  Refer to the list below for future screening recommendations based on patient's age, sex and/or medical conditions. Orders for these recommended tests are listed in the plan section. The patient has been provided with a written plan.    Health Maintenance List  Health Maintenance   Topic Date Due    LUNG CANCER SCREENING  06/24/2025    ZOSTER VACCINE (1 of 2) 06/05/2026 (Originally 7/31/2004)    INFLUENZA VACCINE  07/01/2025    LIPID PANEL  12/03/2025    ANNUAL WELLNESS VISIT  06/05/2026    COLORECTAL CANCER SCREENING  06/18/2029    TDAP/TD VACCINES (2 - Td or Tdap)  06/03/2034    HEPATITIS C SCREENING  Completed    Pneumococcal Vaccine 50+  Completed    AAA SCREEN ONCE  Completed    COVID-19 Vaccine  Discontinued                                                                                                                                                CMS Preventative Services Quick Reference  Risk Factors Identified During Encounter  Dental Screening Recommended  Vision Screening Recommended    The above risks/problems have been discussed with the patient.  Pertinent information has been shared with the patient in the After Visit Summary.  An After Visit Summary and PPPS were made available to the patient.    Follow Up:   Next Medicare Wellness visit to be scheduled in 1 year.         Additional E&M Note during same encounter follows:  Patient has additional, significant, and separately identifiable condition(s)/problem(s) that require work above and beyond the Medicare Wellness Visit     Chief Complaint  No chief complaint on file.    Subjective    HPI  MYKE is also being seen today for additional medical problem/s.       The patient is a 69-year-old male presenting for follow-up and Medicare checkup.    He reports decreased energy levels, attributing it to Keppra 750 mg BID. No seizures or syncope. Concerned about Keppra side effects. Takes tamsulosin for prostate issues, unsure of interactions. Prefers taking all medications in the morning.    Experiences occasional finger tremors, noticeable during fine motor tasks like gardening, more pronounced in the mornings, long-standing but stable. No family history of tremors.    No current pain, maintains healthy diet with reduced appetite, lost 5 pounds in 2-3 weeks. 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 Reports fair memory, has a living will. Not vaccinated for COVID-19, up to date with tetanus. History of shingles affecting right eye, causing significant swelling. Annual eye  "exams, occasional blurred vision lasting 2-5 minutes weekly, unsure if informed ophthalmologist. History of passing out spells, initially attributed to low BP, now suspects left hip issue causing occasional instability. No sleep apnea, reports excessive sleep duration up to 12 hours.Takes senior multivitamin.    Currently on losartan and amlodipine for BP management, colestipol for cholesterol, tamsulosin for prostate issues.    SOCIAL HISTORY  - Smokes a pack a day      FAMILY HISTORY  - No family history of tremors          Objective   Vital Signs:  /82   Pulse 70   Temp 97.5 °F (36.4 °C)   Resp 18   Ht 185.4 cm (73\")   Wt 106 kg (233 lb)   BMI 30.74 kg/m²   Physical Exam  Vitals and nursing note reviewed.   Constitutional:       General: He is not in acute distress.     Appearance: Normal appearance. He is not ill-appearing or toxic-appearing.   HENT:      Head: Normocephalic.   Neurological:      Mental Status: He is alert.   Psychiatric:         Mood and Affect: Mood normal.         Behavior: Behavior normal.           Neurological: Awake, alert, oriented , no focal deficit  Head: Normocephalic, atraumatic  Eyes: Tracking normally  Respiratory: Clear to auscultation, no wheezing, rales, or rhonchi  Cardiovascular: Regular rate and rhythm, no murmurs, rubs, or gallops  Gastrointestinal: Tenderness on palpation of the abdomen    No resting tremor.  No essential tremor at this time either.  He states it comes and goes with activity.  Or fine motor work.            Results  - MRI brain (10/2023):    - No tumors or abnormalities  - CT head:    - No abnormalities           Assessment and Plan     Diagnoses and all orders for this visit:    1. Medicare annual wellness visit, subsequent (Primary)    2. Seizure disorder  -     CBC & Differential  -     Comprehensive Metabolic Panel  -     Levetiracetam Level (Keppra)    3. Tremor    4. Benign prostatic hyperplasia (BPH) with straining on urination  -     CBC " & Differential  -     Comprehensive Metabolic Panel    5. Essential hypertension  -     CBC & Differential  -     Comprehensive Metabolic Panel  -     Lipid Panel With / Chol / HDL Ratio    6. Other fatigue  -     CBC & Differential  -     Comprehensive Metabolic Panel  -     TSH  -     Vitamin B12  -     Testosterone    7. Diarrhea, unspecified type          Fatigue.  Attributed to Keppra 750 mg BID. No seizures or syncope since starting Keppra. Check thyroid function, B12, testosterone levels. Conduct Keppra level test to consider dosage reduction.    Essential tremor.  Mild finger tremors during tasks, stable. Parkinson's unlikely, tremors occur during activity. Conduct Keppra level test to consider dosage reduction.    Blurred vision.  Episodes lasting 2-5 minutes weekly. MRI brain (10/2023): No tumors or abnormalities. Advise informing ophthalmologist.    Prostate issues.  Takes tamsulosin, occasional urinary urgency and flow issues. No straining or nocturia, improved flow. Continue tamsulosin at night, can take with other medications if forgets. Occasional urgency with gas, advised gas expel as needed.    Hypertension.  BP well-controlled at 130/82 mmHg on losartan and amlodipine. Continues smoking. Continue current medication regimen.    Cholesterol management.  Off cholesterol medication, levels decreased. Conduct cholesterol panel. If results good, no fasting for future tests. Reports 5-pound weight loss in 2-3 weeks.    Health maintenance.  Not vaccinated for COVID-19, up to date on tetanus. Consider shingles vaccine. Low-dose CT scan for lung cancer screening (06/25/2024), prefers repeat next year. Conduct blood work to check blood count, liver, kidney, cholesterol, thyroid, B12, testosterone levels.         Follow Up   Return in about 6 months (around 12/5/2025).  Patient was given instructions and counseling regarding his condition or for health maintenance advice. Please see specific information pulled  into the AVS if appropriate.  Patient or patient representative verbalized consent for the use of Ambient Listening during the visit with  Brent Botello MD for chart documentation. 6/5/2025  17:21 EDT

## 2025-06-07 LAB
ALBUMIN SERPL-MCNC: 4.1 G/DL (ref 3.9–4.9)
ALP SERPL-CCNC: 79 IU/L (ref 44–121)
ALT SERPL-CCNC: 40 IU/L (ref 0–44)
AST SERPL-CCNC: 48 IU/L (ref 0–40)
BASOPHILS # BLD AUTO: 0 X10E3/UL (ref 0–0.2)
BASOPHILS NFR BLD AUTO: 1 %
BILIRUB SERPL-MCNC: 1.9 MG/DL (ref 0–1.2)
BUN SERPL-MCNC: 18 MG/DL (ref 8–27)
BUN/CREAT SERPL: 13 (ref 10–24)
CALCIUM SERPL-MCNC: 9.2 MG/DL (ref 8.6–10.2)
CHLORIDE SERPL-SCNC: 101 MMOL/L (ref 96–106)
CHOLEST SERPL-MCNC: 149 MG/DL (ref 100–199)
CHOLEST/HDLC SERPL: 2.4 RATIO (ref 0–5)
CO2 SERPL-SCNC: 23 MMOL/L (ref 20–29)
CREAT SERPL-MCNC: 1.37 MG/DL (ref 0.76–1.27)
EGFRCR SERPLBLD CKD-EPI 2021: 55 ML/MIN/1.73
EOSINOPHIL # BLD AUTO: 0.2 X10E3/UL (ref 0–0.4)
EOSINOPHIL NFR BLD AUTO: 3 %
ERYTHROCYTE [DISTWIDTH] IN BLOOD BY AUTOMATED COUNT: 12.4 % (ref 11.6–15.4)
GLOBULIN SER CALC-MCNC: 1.8 G/DL (ref 1.5–4.5)
GLUCOSE SERPL-MCNC: 91 MG/DL (ref 70–99)
HCT VFR BLD AUTO: 48.2 % (ref 37.5–51)
HDLC SERPL-MCNC: 63 MG/DL
HGB BLD-MCNC: 15.7 G/DL (ref 13–17.7)
IMM GRANULOCYTES # BLD AUTO: 0 X10E3/UL (ref 0–0.1)
IMM GRANULOCYTES NFR BLD AUTO: 0 %
LDLC SERPL CALC-MCNC: 69 MG/DL (ref 0–99)
LEVETIRACETAM SERPL-MCNC: 41 UG/ML (ref 10–40)
LYMPHOCYTES # BLD AUTO: 1 X10E3/UL (ref 0.7–3.1)
LYMPHOCYTES NFR BLD AUTO: 19 %
MCH RBC QN AUTO: 32.5 PG (ref 26.6–33)
MCHC RBC AUTO-ENTMCNC: 32.6 G/DL (ref 31.5–35.7)
MCV RBC AUTO: 100 FL (ref 79–97)
MONOCYTES # BLD AUTO: 0.4 X10E3/UL (ref 0.1–0.9)
MONOCYTES NFR BLD AUTO: 9 %
NEUTROPHILS # BLD AUTO: 3.6 X10E3/UL (ref 1.4–7)
NEUTROPHILS NFR BLD AUTO: 68 %
PLATELET # BLD AUTO: 144 X10E3/UL (ref 150–450)
POTASSIUM SERPL-SCNC: 3.8 MMOL/L (ref 3.5–5.2)
PROT SERPL-MCNC: 5.9 G/DL (ref 6–8.5)
RBC # BLD AUTO: 4.83 X10E6/UL (ref 4.14–5.8)
SODIUM SERPL-SCNC: 141 MMOL/L (ref 134–144)
TESTOST SERPL-MCNC: 246 NG/DL (ref 264–916)
TRIGL SERPL-MCNC: 91 MG/DL (ref 0–149)
TSH SERPL DL<=0.005 MIU/L-ACNC: 2.93 UIU/ML (ref 0.45–4.5)
VIT B12 SERPL-MCNC: 426 PG/ML (ref 232–1245)
VLDLC SERPL CALC-MCNC: 17 MG/DL (ref 5–40)
WBC # BLD AUTO: 5.2 X10E3/UL (ref 3.4–10.8)

## 2025-06-19 LAB
MC_CV_MDC_IDC_RATE_1: 150
MC_CV_MDC_IDC_RATE_1: 171
MC_CV_MDC_IDC_THERAPIES: NORMAL
MC_CV_MDC_IDC_ZONE_ID: 2
MC_CV_MDC_IDC_ZONE_ID: 6
MDC_IDC_MSMT_BATTERY_REMAINING_LONGEVITY: 134 MO
MDC_IDC_MSMT_BATTERY_RRT_TRIGGER: 2.62
MDC_IDC_MSMT_BATTERY_STATUS: NORMAL
MDC_IDC_MSMT_BATTERY_VOLTAGE: 3.01
MDC_IDC_MSMT_LEADCHNL_RA_DTM: NORMAL
MDC_IDC_MSMT_LEADCHNL_RA_IMPEDANCE_VALUE: 304
MDC_IDC_MSMT_LEADCHNL_RA_PACING_THRESHOLD_AMPLITUDE: 0.62
MDC_IDC_MSMT_LEADCHNL_RA_PACING_THRESHOLD_POLARITY: NORMAL
MDC_IDC_MSMT_LEADCHNL_RA_PACING_THRESHOLD_PULSEWIDTH: 0.4
MDC_IDC_MSMT_LEADCHNL_RA_SENSING_INTR_AMPL: 2.62
MDC_IDC_MSMT_LEADCHNL_RV_DTM: NORMAL
MDC_IDC_MSMT_LEADCHNL_RV_IMPEDANCE_VALUE: 418
MDC_IDC_MSMT_LEADCHNL_RV_PACING_THRESHOLD_AMPLITUDE: 1.25
MDC_IDC_MSMT_LEADCHNL_RV_PACING_THRESHOLD_POLARITY: NORMAL
MDC_IDC_MSMT_LEADCHNL_RV_PACING_THRESHOLD_PULSEWIDTH: 0.4
MDC_IDC_MSMT_LEADCHNL_RV_SENSING_INTR_AMPL: 31.62
MDC_IDC_PG_IMPLANT_DTM: NORMAL
MDC_IDC_PG_MFG: NORMAL
MDC_IDC_PG_MODEL: NORMAL
MDC_IDC_PG_SERIAL: NORMAL
MDC_IDC_PG_TYPE: NORMAL
MDC_IDC_SESS_DTM: NORMAL
MDC_IDC_SESS_TYPE: NORMAL
MDC_IDC_SET_BRADY_AT_MODE_SWITCH_RATE: 171
MDC_IDC_SET_BRADY_LOWRATE: 60
MDC_IDC_SET_BRADY_MAX_SENSOR_RATE: 130
MDC_IDC_SET_BRADY_MAX_TRACKING_RATE: 130
MDC_IDC_SET_BRADY_MODE: NORMAL
MDC_IDC_SET_BRADY_PAV_DELAY: 180
MDC_IDC_SET_BRADY_SAV_DELAY: 150
MDC_IDC_SET_LEADCHNL_RA_PACING_AMPLITUDE: 1.5
MDC_IDC_SET_LEADCHNL_RA_PACING_POLARITY: NORMAL
MDC_IDC_SET_LEADCHNL_RA_PACING_PULSEWIDTH: 0.4
MDC_IDC_SET_LEADCHNL_RA_SENSING_POLARITY: NORMAL
MDC_IDC_SET_LEADCHNL_RA_SENSING_SENSITIVITY: 0.6
MDC_IDC_SET_LEADCHNL_RV_PACING_AMPLITUDE: 2.5
MDC_IDC_SET_LEADCHNL_RV_PACING_POLARITY: NORMAL
MDC_IDC_SET_LEADCHNL_RV_PACING_PULSEWIDTH: 0.4
MDC_IDC_SET_LEADCHNL_RV_SENSING_POLARITY: NORMAL
MDC_IDC_SET_LEADCHNL_RV_SENSING_SENSITIVITY: 2
MDC_IDC_SET_ZONE_STATUS: NORMAL
MDC_IDC_SET_ZONE_STATUS: NORMAL
MDC_IDC_SET_ZONE_TYPE: NORMAL
MDC_IDC_SET_ZONE_TYPE: NORMAL
MDC_IDC_STAT_AT_BURDEN_PERCENT: 0
MDC_IDC_STAT_BRADY_RA_PERCENT_PACED: 17.28
MDC_IDC_STAT_BRADY_RV_PERCENT_PACED: 94.26

## 2025-07-07 ENCOUNTER — LAB (OUTPATIENT)
Dept: FAMILY MEDICINE CLINIC | Facility: CLINIC | Age: 70
End: 2025-07-07
Payer: MEDICARE

## 2025-07-08 ENCOUNTER — RESULTS FOLLOW-UP (OUTPATIENT)
Dept: FAMILY MEDICINE CLINIC | Facility: CLINIC | Age: 70
End: 2025-07-08
Payer: MEDICARE

## 2025-07-09 NOTE — TELEPHONE ENCOUNTER
"Name: Andrae Garza Jr. \"MYKE\"      Relationship: Self      Best Callback Number: [phone]       HUB PROVIDED THE RELAY MESSAGE FROM THE OFFICE      PATIENT: VOICED UNDERSTANDING AND HAS NO FURTHER QUESTIONS AT THIS TIME    ADDITIONAL INFORMATION:    "

## 2025-07-24 NOTE — TELEPHONE ENCOUNTER
Caller: Andrae Garza Jr.    Relationship: Self    Best call back number: [phone]    Caller requesting test results: YES     What test was performed: EEG    When was the test performed: 09/10/2021     Where was the test performed: [facility]     Additional notes: PATIENT CALLED TO RECEIVED THE RESULTS OF HIS EEG. PLEASE ADVISE AND CALL PATIENT [phone]      No

## 2025-08-11 ENCOUNTER — TELEPHONE (OUTPATIENT)
Dept: ADMINISTRATIVE | Facility: OTHER | Age: 70
End: 2025-08-11
Payer: MEDICARE

## 2025-08-27 ENCOUNTER — TELEPHONE (OUTPATIENT)
Dept: FAMILY MEDICINE CLINIC | Facility: CLINIC | Age: 70
End: 2025-08-27
Payer: MEDICARE

## 2025-08-27 DIAGNOSIS — Z87.891 PERSONAL HISTORY OF TOBACCO USE, PRESENTING HAZARDS TO HEALTH: ICD-10-CM

## 2025-08-27 DIAGNOSIS — Z12.2 ENCOUNTER FOR SCREENING FOR LUNG CANCER: Primary | ICD-10-CM

## 2025-08-27 DIAGNOSIS — F17.210 NICOTINE DEPENDENCE, CIGARETTES, UNCOMPLICATED: ICD-10-CM

## (undated) DEVICE — SET PRIMARY GRVTY 10DP MALE LL 104IN

## (undated) DEVICE — GW DIAG .032

## (undated) DEVICE — ELECTRD RETRN/GRND MEGADYNE SGL/PLT W/CORD 9FT DISP

## (undated) DEVICE — TUBING, SUCTION, 1/4" X 10', STRAIGHT: Brand: MEDLINE

## (undated) DEVICE — TRAP FLD MINIVAC MEGADYNE 100ML

## (undated) DEVICE — ADULT, W/LG. BACK PAD, RADIOTRANSPARENT ELEMENT AND LEAD WIRE: Brand: DEFIBRILLATION ELECTRODES

## (undated) DEVICE — ST EXT IV SMARTSITE 2VLV SP M LL 5ML IV1

## (undated) DEVICE — SOL NACL 0.9PCT 1000ML

## (undated) DEVICE — CANN NASL CO2 DIVIDED A/

## (undated) DEVICE — MEDI-VAC YANKAUER SUCTION HANDLE W/BULBOUS TIP: Brand: CARDINAL HEALTH

## (undated) DEVICE — ST INF PRI SMRTSTE 20DRP 2VLV 24ML 117

## (undated) DEVICE — GW PERIPH GUIDERIGHT STD/EXCHNG/J/TIP SS 0.035IN 5X260CM

## (undated) DEVICE — INTRO TEAR AWAY/LVD W/SD PRT 7F 13CM

## (undated) DEVICE — GLIDESHEATH BASIC HYDROPHILIC COATED INTRODUCER SHEATH: Brand: GLIDESHEATH

## (undated) DEVICE — LEX ELECTRO PHYSIOLOGY: Brand: MEDLINE INDUSTRIES, INC.

## (undated) DEVICE — PK CATH CARD 10

## (undated) DEVICE — DECANT BG O JET

## (undated) DEVICE — DRSNG SURG AQUACEL AG 9X10CM

## (undated) DEVICE — CAUTERY TIP POLISHER: Brand: DEVON

## (undated) DEVICE — NDL PERC 1PART ECHOTIP WO/BASEPLT 18G 7CM

## (undated) DEVICE — LIMB HOLDER, WRIST/ANKLE: Brand: DEROYAL

## (undated) DEVICE — MODEL BT2000 P/N 700287-012KIT CONTENTS: MANIFOLD WITH SALINE AND CONTRAST PORTS, SALINE TUBING WITH SPIKE AND HAND SYRINGE, TRANSDUCER: Brand: BT2000 AUTOMATED MANIFOLD KIT

## (undated) DEVICE — DEV COMP RAD PRELUDESYNC 24CM

## (undated) DEVICE — IRRIGATOR BULB ASEPTO 60CC STRL

## (undated) DEVICE — MODEL AT P65, P/N 701554-001KIT CONTENTS: HAND CONTROLLER, 3-WAY HIGH-PRESSURE STOPCOCK WITH ROTATING END AND PREMIUM HIGH-PRESSURE TUBING: Brand: ANGIOTOUCH® KIT

## (undated) DEVICE — CATH GUIDE RIGHTSITE C315HIS-02

## (undated) DEVICE — CATH DIAG EXPO M/ PK 6FR FL4/FR4 PIG 3PK

## (undated) DEVICE — TBG PENCL TELESCP MEGADYNE SMOKE EVAC 10FT

## (undated) DEVICE — PLASMABLADE PS210-030S 3.0S LOCK: Brand: PLASMABLADE™

## (undated) DEVICE — INTRO TEAR AWAY/LVD W/SD PRT 9F 13CM

## (undated) DEVICE — RADIFOCUS GLIDEWIRE: Brand: GLIDEWIRE

## (undated) DEVICE — DEV COMPR SAFEGUARDFOCUS POST/OP ADHS/BND 17CM 120ML LF

## (undated) DEVICE — CATH DIAG EXPO .056 FL3.5 6F 100CM

## (undated) DEVICE — DRSNG SURG AQUACEL AG/ADVNTGE 9X15CM 3.5X6IN

## (undated) DEVICE — SPNG LAP PREWSH SFTPK 18X18IN STRL PK/5